# Patient Record
Sex: MALE | Employment: OTHER | URBAN - METROPOLITAN AREA
[De-identification: names, ages, dates, MRNs, and addresses within clinical notes are randomized per-mention and may not be internally consistent; named-entity substitution may affect disease eponyms.]

---

## 2023-02-22 ENCOUNTER — HOSPITAL ENCOUNTER (EMERGENCY)
Age: 78
Discharge: HOME | End: 2023-02-22
Payer: MEDICARE

## 2023-02-22 VITALS
DIASTOLIC BLOOD PRESSURE: 70 MMHG | HEART RATE: 83 BPM | WEIGHT: 200 LBS | OXYGEN SATURATION: 95 % | SYSTOLIC BLOOD PRESSURE: 102 MMHG | HEIGHT: 71 IN | BODY MASS INDEX: 28 KG/M2 | RESPIRATION RATE: 16 BRPM

## 2023-02-22 DIAGNOSIS — N39.0 URINARY TRACT INFECTION ASSOCIATED WITH INDWELLING URETHRAL CATHETER, INITIAL ENCOUNTER (HCC): ICD-10-CM

## 2023-02-22 DIAGNOSIS — Z46.6 ENCOUNTER FOR FOLEY CATHETER REPLACEMENT: Primary | ICD-10-CM

## 2023-02-22 DIAGNOSIS — T83.511A URINARY TRACT INFECTION ASSOCIATED WITH INDWELLING URETHRAL CATHETER, INITIAL ENCOUNTER (HCC): ICD-10-CM

## 2023-02-22 LAB
BACTERIA: ABNORMAL /HPF
BILIRUBIN URINE: NEGATIVE
BLOOD, URINE: ABNORMAL
CLARITY: ABNORMAL
COLOR: ABNORMAL
EPITHELIAL CELLS, UA: ABNORMAL /HPF (ref 0–5)
GLUCOSE URINE: NEGATIVE MG/DL
HYALINE CASTS: ABNORMAL /HPF (ref 0–5)
KETONES, URINE: NEGATIVE MG/DL
LEUKOCYTE ESTERASE, URINE: ABNORMAL
NITRITE, URINE: NEGATIVE
PH UA: 5 (ref 5–9)
PROTEIN UA: >=300 MG/DL
RBC UA: >100 /HPF (ref 0–2)
SPECIFIC GRAVITY UA: 1.02 (ref 1–1.03)
UROBILINOGEN, URINE: 0.2 E.U./DL
WBC UA: >100 /HPF (ref 0–5)

## 2023-02-22 PROCEDURE — 81001 URINALYSIS AUTO W/SCOPE: CPT

## 2023-02-22 PROCEDURE — 99284 EMERGENCY DEPT VISIT MOD MDM: CPT

## 2023-02-22 PROCEDURE — 6370000000 HC RX 637 (ALT 250 FOR IP): Performed by: PHYSICIAN ASSISTANT

## 2023-02-22 PROCEDURE — 6360000002 HC RX W HCPCS: Performed by: PHYSICIAN ASSISTANT

## 2023-02-22 PROCEDURE — 96374 THER/PROPH/DIAG INJ IV PUSH: CPT

## 2023-02-22 PROCEDURE — 96375 TX/PRO/DX INJ NEW DRUG ADDON: CPT

## 2023-02-22 RX ORDER — CIPROFLOXACIN 500 MG/1
500 TABLET, FILM COATED ORAL ONCE
Status: COMPLETED | OUTPATIENT
Start: 2023-02-22 | End: 2023-02-22

## 2023-02-22 RX ORDER — MORPHINE SULFATE 2 MG/ML
2 INJECTION, SOLUTION INTRAMUSCULAR; INTRAVENOUS ONCE
Status: COMPLETED | OUTPATIENT
Start: 2023-02-22 | End: 2023-02-22

## 2023-02-22 RX ORDER — LIDOCAINE HYDROCHLORIDE 20 MG/ML
JELLY TOPICAL PRN
Status: DISCONTINUED | OUTPATIENT
Start: 2023-02-22 | End: 2023-02-22 | Stop reason: HOSPADM

## 2023-02-22 RX ORDER — CIPROFLOXACIN 500 MG/1
500 TABLET, FILM COATED ORAL 2 TIMES DAILY
Qty: 10 TABLET | Refills: 0 | Status: SHIPPED | OUTPATIENT
Start: 2023-02-22 | End: 2023-02-27

## 2023-02-22 RX ORDER — ONDANSETRON 2 MG/ML
4 INJECTION INTRAMUSCULAR; INTRAVENOUS ONCE
Status: COMPLETED | OUTPATIENT
Start: 2023-02-22 | End: 2023-02-22

## 2023-02-22 RX ADMIN — LIDOCAINE HYDROCHLORIDE: 20 JELLY TOPICAL at 16:50

## 2023-02-22 RX ADMIN — ONDANSETRON 4 MG: 2 INJECTION INTRAMUSCULAR; INTRAVENOUS at 16:48

## 2023-02-22 RX ADMIN — MORPHINE SULFATE 2 MG: 2 INJECTION, SOLUTION INTRAMUSCULAR; INTRAVENOUS at 16:49

## 2023-02-22 RX ADMIN — CIPROFLOXACIN 500 MG: 500 TABLET, FILM COATED ORAL at 18:09

## 2023-02-22 ASSESSMENT — ENCOUNTER SYMPTOMS
EYE DISCHARGE: 0
CONSTIPATION: 0
ABDOMINAL DISTENTION: 0
RHINORRHEA: 0
SORE THROAT: 0
COLOR CHANGE: 0
ABDOMINAL PAIN: 0
SHORTNESS OF BREATH: 0

## 2023-02-22 ASSESSMENT — PAIN SCALES - GENERAL
PAINLEVEL_OUTOF10: 0
PAINLEVEL_OUTOF10: 0
PAINLEVEL_OUTOF10: 8

## 2023-02-22 ASSESSMENT — PAIN DESCRIPTION - FREQUENCY: FREQUENCY: CONTINUOUS

## 2023-02-22 ASSESSMENT — PAIN DESCRIPTION - PAIN TYPE: TYPE: CHRONIC PAIN

## 2023-02-22 ASSESSMENT — PAIN DESCRIPTION - LOCATION: LOCATION: PENIS

## 2023-02-22 ASSESSMENT — PAIN - FUNCTIONAL ASSESSMENT: PAIN_FUNCTIONAL_ASSESSMENT: 0-10

## 2023-02-22 NOTE — ED TRIAGE NOTES
Patient has had a larson placed at [facility], the [facility] was attempting to replace it and had complications upon putting a new larson placed. After attempts and issues with a new larson he started to have bleeding from the tip of his penis.

## 2023-02-22 NOTE — ED PROVIDER NOTES
[address] [facility] ED  eMERGENCY dEPARTMENT eNCOUnter      Pt Name: Ulises Mir  MRN: [id number]  [place] 1945  Date of evaluation: 2/22/2023  Provider: Alejandro Kelly PA-C    CHIEF COMPLAINT       Chief Complaint   Patient presents with    Penile Discharge     Bleeding from his penis, after several catheter attempts and removals         HISTORY OF PRESENT ILLNESS   (Location/Symptom, Timing/Onset,Context/Setting, Quality, Duration, Modifying Factors, Severity)  Note limiting factors. Ulises Mir is a 76 y.o. male who presents to the emergency department with complaint of urethral bleeding secondary to attempts at inserting a Lyons catheter at nursing home. States has indwelling Lyons catheter which is chronically present for him, he states has not been changed for approximate last 8 months, at the nursing facility today they decided to change it out, they remove the other 1 without any difficulty, and then tried to replace it, they were unable to successfully replace Lyons catheter, patient states that he has had bleeding from the penis since the attempts at reinsertion of Lyons catheter. Patient denies any pain at this time, 0-10 states Lyons catheter was removed around 5 AM this morning. He has not been able to urinate or produce urine since removal of Lyons catheter. HPI    NursingNotes were reviewed. REVIEW OF SYSTEMS    (2-9 systems for level 4, 10 or more for level 5)     Review of Systems   Constitutional:  Negative for activity change and appetite change. HENT:  Negative for congestion, ear discharge, ear pain, nosebleeds, rhinorrhea and sore throat. Eyes:  Negative for discharge. Respiratory:  Negative for shortness of breath. Cardiovascular:  Negative for chest pain, palpitations and leg swelling. Gastrointestinal:  Negative for abdominal distention, abdominal pain and constipation. Genitourinary:  Negative for difficulty urinating and dysuria.         Urethral Malnutrition Findings: BMI Body mass index is 22 67 kg/m²       · Mild chronic malnutrition bleeding   Musculoskeletal:  Negative for arthralgias. Skin:  Negative for color change. Neurological:  Negative for dizziness, syncope, numbness and headaches. Psychiatric/Behavioral:  Negative for agitation and confusion. Except as noted above the remainder of the review of systems was reviewed and negative. PAST MEDICAL HISTORY   No past medical history on file. SURGICALHISTORY     No past surgical history on file. CURRENT MEDICATIONS       Previous Medications    No medications on file            Penicillins, Sulfamethoxazole, Tapentadol, and Trimethoprim    FAMILY HISTORY     No family history on file. SOCIAL HISTORY       Social History     Socioeconomic History    Marital status:        SCREENINGS    [place] Coma Scale  Eye Opening: Spontaneous  Best Verbal Response: Oriented  Best Motor Response: Obeys commands  [place] Coma Scale Score: 15        PHYSICAL EXAM    (up to 7 for level 4, 8 or more for level 5)     ED Triage Vitals [02/22/23 1521]   BP Temp Temp src Heart Rate Resp SpO2 Height Weight   94/66 -- -- 88 18 95 % 5' 11\" (1.803 m) 200 lb (90.7 kg)       Physical Exam  Vitals and nursing note reviewed. Constitutional:       General: He is not in acute distress. Appearance: He is well-developed. He is not ill-appearing, toxic-appearing or diaphoretic. HENT:      Head: Normocephalic. Nose: No congestion. Mouth/Throat:      Mouth: Mucous membranes are moist.      Pharynx: No oropharyngeal exudate or posterior oropharyngeal erythema. Eyes:      Extraocular Movements: Extraocular movements intact. Conjunctiva/sclera: Conjunctivae normal.      Pupils: Pupils are equal, round, and reactive to light. Neck:      Vascular: No JVD. Trachea: No tracheal deviation. Cardiovascular:      Rate and Rhythm: Normal rate. Pulses: Normal pulses. Heart sounds: Normal heart sounds. No murmur heard. No friction rub. No gallop.    Pulmonary: Effort: Pulmonary effort is normal. No tachypnea, accessory muscle usage, respiratory distress or retractions. Breath sounds: No stridor. No wheezing, rhonchi or rales. Chest:      Chest wall: No tenderness. Abdominal:      General: Abdomen is flat. Bowel sounds are normal. There is no distension or abdominal bruit. Palpations: There is no shifting dullness, fluid wave, hepatomegaly, splenomegaly, mass or pulsatile mass. Tenderness: There is no abdominal tenderness. There is no right CVA tenderness, left CVA tenderness, guarding or rebound. Negative signs include Davis's sign, Rovsing's sign and McBurney's sign. Genitourinary:     Comments: Patient has no visible signs of penile injury, there is a small amount of blood noted at the opening of the meatus. No swelling, no other drainage or discharge is noted. Abdomen soft nondistended. Musculoskeletal:         General: No deformity. Cervical back: Normal range of motion and neck supple. No rigidity. Skin:     General: Skin is warm and dry. Capillary Refill: Capillary refill takes less than 2 seconds. Coloration: Skin is not jaundiced. Neurological:      General: No focal deficit present. Mental Status: He is alert and oriented to person, place, and time. Mental status is at baseline. Cranial Nerves: No cranial nerve deficit. Sensory: No sensory deficit. Motor: No weakness.       Coordination: Coordination normal.   Psychiatric:         Mood and Affect: Mood normal.       RESULTS     EKG: All EKG's are interpreted by the Emergency Department Physician who either signs or Co-signsthis chart in the absence of a cardiologist.        RADIOLOGY:   Non-plain filmimages such as CT, Ultrasound and MRI are read by the radiologist. Plain radiographic images are visualized and preliminarily interpreted by the emergency physician with the below findings:      Interpretation per the Radiologist below, if available at the time ofthis note:    No orders to display         ED BEDSIDE ULTRASOUND:   Performed by ED Physician - none    LABS:  Labs Reviewed   URINALYSIS - Abnormal; Notable for the following components:       Result Value    Color, UA RED (*)     Clarity, UA TURBID (*)     Blood, Urine LARGE (*)     Protein, UA >=300 (*)     Leukocyte Esterase, Urine LARGE (*)     All other components within normal limits   MICROSCOPIC URINALYSIS       All other labs were within normal range or not returned as of this dictation. EMERGENCY DEPARTMENT COURSE and DIFFERENTIAL DIAGNOSIS/MDM:   Vitals:    Vitals:    02/22/23 1521 02/22/23 1649   BP: 94/66    Pulse: 88    Resp: 18 20   SpO2: 95%    Weight: 200 lb (90.7 kg)    Height: 5' 11\" (1.803 m)             Medical Decision Making  Presented to ED with complaint of urethral bleeding secondary to attempts at reinsertion of Lyons catheter at nursing facility. Patient states that indwelling Lyons catheter for quite some time, he states last changed approximately 8 months ago, a Lyons catheter change today at facility was unsuccessful, they had multiple attempts and patient started having bleeding from the urethral area. On arrival to the ED, there is small mount of blood noted within the tip of the penis, Lyons catheter was easily replaced, and was flowing blood-tinged urine, which was cloudy. This was sent off for analysis, there are concerns for urinary tract infection. Patient was given a prescription for Cipro, and advised to contact his family provider for follow-up. Should he have any worsening or change symptoms, advised return to ED for reevaluation. Amount and/or Complexity of Data Reviewed  Labs: ordered. Risk  Prescription drug management. Coding     CONSULTS:  None    PROCEDURES:  Unless otherwise noted below, none     Procedures    FINAL IMPRESSION      1. Encounter for Lyons catheter replacement    2.  Urinary tract infection associated with indwelling urethral catheter, initial encounter [facility])          DISPOSITION/PLAN   DISPOSITION Decision To Discharge 02/22/2023 05:57:15 PM      PATIENT REFERRED TO:  Go Gaspar MD    In 3 days      Stephanie Domínguez MD  [address]:  New Prescriptions    CIPROFLOXACIN (CIPRO) 500 MG TABLET    Take 1 tablet by mouth 2 times daily for 5 days          (Please note that portions of this note were completed with a voice recognition program.  Efforts were made to edit the dictations but occasionally words are mis-transcribed.)    Gene Sherwood PA-C (electronically signed)  Attending Emergency Physician         Gene Sherwood PA-C  02/22/23 15 [facility] Sejal Lawrence PA-C  02/22/23 [id number]

## 2023-03-02 LAB — SARS-COV-2: DETECTED

## 2023-03-08 ENCOUNTER — HOSPITAL ENCOUNTER (INPATIENT)
Age: 78
LOS: 10 days | Discharge: ANOTHER HOSPITAL | DRG: 177 | End: 2023-03-18
Attending: EMERGENCY MEDICINE | Admitting: INTERNAL MEDICINE
Payer: MEDICARE

## 2023-03-08 ENCOUNTER — APPOINTMENT (OUTPATIENT)
Dept: CT IMAGING | Age: 78
DRG: 177 | End: 2023-03-08
Payer: MEDICARE

## 2023-03-08 ENCOUNTER — APPOINTMENT (OUTPATIENT)
Dept: GENERAL RADIOLOGY | Age: 78
DRG: 177 | End: 2023-03-08
Payer: MEDICARE

## 2023-03-08 DIAGNOSIS — R09.02 HYPOXIA: Primary | ICD-10-CM

## 2023-03-08 DIAGNOSIS — U07.1 COVID-19: ICD-10-CM

## 2023-03-08 DIAGNOSIS — R06.00 DYSPNEA, UNSPECIFIED TYPE: ICD-10-CM

## 2023-03-08 DIAGNOSIS — U07.1 COVID-19 VIRUS RNA TEST RESULT POSITIVE AT LIMIT OF DETECTION: ICD-10-CM

## 2023-03-08 DIAGNOSIS — I50.40 COMBINED SYSTOLIC AND DIASTOLIC CONGESTIVE HEART FAILURE, UNSPECIFIED HF CHRONICITY (HCC): ICD-10-CM

## 2023-03-08 DIAGNOSIS — J98.11 COLLAPSE OF LEFT LUNG: ICD-10-CM

## 2023-03-08 PROBLEM — J96.01 ACUTE RESPIRATORY FAILURE WITH HYPOXIA (HCC): Status: ACTIVE | Noted: 2023-03-08

## 2023-03-08 LAB
ALBUMIN SERPL-MCNC: 3 G/DL (ref 3.5–4.6)
ALP BLD-CCNC: 37 U/L (ref 35–104)
ALT SERPL-CCNC: 18 U/L (ref 0–41)
ANION GAP SERPL CALCULATED.3IONS-SCNC: 9 MEQ/L (ref 9–15)
AST SERPL-CCNC: 49 U/L (ref 0–40)
BASE EXCESS ARTERIAL: 4 (ref -3–3)
BASOPHILS ABSOLUTE: 0 K/UL (ref 0–0.2)
BASOPHILS RELATIVE PERCENT: 0.2 %
BILIRUB SERPL-MCNC: 0.7 MG/DL (ref 0.2–0.7)
BUN BLDV-MCNC: 49 MG/DL (ref 8–23)
CALCIUM IONIZED: 1.3 MMOL/L (ref 1.12–1.32)
CALCIUM SERPL-MCNC: 8.7 MG/DL (ref 8.5–9.9)
CHLORIDE BLD-SCNC: 101 MEQ/L (ref 95–107)
CO2: 28 MEQ/L (ref 20–31)
CREAT SERPL-MCNC: 1.18 MG/DL (ref 0.7–1.2)
EOSINOPHILS ABSOLUTE: 0.1 K/UL (ref 0–0.7)
EOSINOPHILS RELATIVE PERCENT: 1 %
GFR SERPL CREATININE-BSD FRML MDRD: 51 ML/MIN/{1.73_M2}
GFR SERPL CREATININE-BSD FRML MDRD: 56 ML/MIN/{1.73_M2}
GFR SERPL CREATININE-BSD FRML MDRD: >60 ML/MIN/{1.73_M2}
GLOBULIN: 3.8 G/DL (ref 2.3–3.5)
GLUCOSE BLD-MCNC: 101 MG/DL (ref 70–99)
GLUCOSE BLD-MCNC: 121 MG/DL (ref 70–99)
HCO3 ARTERIAL: 29.2 MMOL/L (ref 21–29)
HCT VFR BLD CALC: 29 % (ref 42–52)
HEMOGLOBIN: 11.2 GM/DL (ref 13.5–17.5)
HEMOGLOBIN: 9.1 G/DL (ref 14–18)
LACTATE: 1.03 MMOL/L (ref 0.4–2)
LACTIC ACID: 1 MMOL/L (ref 0.5–2.2)
LYMPHOCYTES ABSOLUTE: 1.5 K/UL (ref 1–4.8)
LYMPHOCYTES RELATIVE PERCENT: 12.9 %
MCH RBC QN AUTO: 28.8 PG (ref 27–31.3)
MCHC RBC AUTO-ENTMCNC: 31.4 % (ref 33–37)
MCV RBC AUTO: 91.7 FL (ref 79–92.2)
MONOCYTES ABSOLUTE: 0.8 K/UL (ref 0.2–0.8)
MONOCYTES RELATIVE PERCENT: 7 %
NEUTROPHILS ABSOLUTE: 9.2 K/UL (ref 1.4–6.5)
NEUTROPHILS RELATIVE PERCENT: 78.9 %
O2 SAT, ARTERIAL: 94 % (ref 93–100)
PCO2 ARTERIAL: 53 MM HG (ref 35–45)
PDW BLD-RTO: 21.6 % (ref 11.5–14.5)
PERFORMED ON: ABNORMAL
PERFORMED ON: ABNORMAL
PH ARTERIAL: 7.35 (ref 7.35–7.45)
PLATELET # BLD: 358 K/UL (ref 130–400)
PO2 ARTERIAL: 76 MM HG (ref 75–108)
POC CHLORIDE: 104 MEQ/L (ref 99–110)
POC CREATININE WHOLE BLOOD: 1.4
POC CREATININE: 1.3 MG/DL (ref 0.8–1.3)
POC CREATININE: 1.4 MG/DL (ref 0.8–1.3)
POC HEMATOCRIT: 33 % (ref 41–53)
POC POTASSIUM: 3.7 MEQ/L (ref 3.5–5.1)
POC SAMPLE TYPE: ABNORMAL
POC SAMPLE TYPE: ABNORMAL
POC SODIUM: 140 MEQ/L (ref 136–145)
POTASSIUM SERPL-SCNC: 3.9 MEQ/L (ref 3.4–4.9)
PRO-BNP: NORMAL PG/ML
PROCALCITONIN: 0.07 NG/ML (ref 0–0.15)
RBC # BLD: 3.17 M/UL (ref 4.7–6.1)
REASON FOR REJECTION: NORMAL
REJECTED TEST: NORMAL
SODIUM BLD-SCNC: 138 MEQ/L (ref 135–144)
TCO2 ARTERIAL: 31 MMOL/L (ref 21–32)
TOTAL PROTEIN: 6.8 G/DL (ref 6.3–8)
TROPONIN: 0.02 NG/ML (ref 0–0.01)
WBC # BLD: 11.6 K/UL (ref 4.8–10.8)

## 2023-03-08 PROCEDURE — 36600 WITHDRAWAL OF ARTERIAL BLOOD: CPT

## 2023-03-08 PROCEDURE — 6370000000 HC RX 637 (ALT 250 FOR IP): Performed by: INTERNAL MEDICINE

## 2023-03-08 PROCEDURE — 83605 ASSAY OF LACTIC ACID: CPT

## 2023-03-08 PROCEDURE — 82565 ASSAY OF CREATININE: CPT

## 2023-03-08 PROCEDURE — 71045 X-RAY EXAM CHEST 1 VIEW: CPT

## 2023-03-08 PROCEDURE — 84484 ASSAY OF TROPONIN QUANT: CPT

## 2023-03-08 PROCEDURE — 84295 ASSAY OF SERUM SODIUM: CPT

## 2023-03-08 PROCEDURE — 87040 BLOOD CULTURE FOR BACTERIA: CPT

## 2023-03-08 PROCEDURE — 71275 CT ANGIOGRAPHY CHEST: CPT

## 2023-03-08 PROCEDURE — 96375 TX/PRO/DX INJ NEW DRUG ADDON: CPT

## 2023-03-08 PROCEDURE — 84132 ASSAY OF SERUM POTASSIUM: CPT

## 2023-03-08 PROCEDURE — 85014 HEMATOCRIT: CPT

## 2023-03-08 PROCEDURE — 36415 COLL VENOUS BLD VENIPUNCTURE: CPT

## 2023-03-08 PROCEDURE — 83880 ASSAY OF NATRIURETIC PEPTIDE: CPT

## 2023-03-08 PROCEDURE — 94761 N-INVAS EAR/PLS OXIMETRY MLT: CPT

## 2023-03-08 PROCEDURE — 6360000002 HC RX W HCPCS: Performed by: EMERGENCY MEDICINE

## 2023-03-08 PROCEDURE — 1210000000 HC MED SURG R&B

## 2023-03-08 PROCEDURE — 2700000000 HC OXYGEN THERAPY PER DAY

## 2023-03-08 PROCEDURE — 93005 ELECTROCARDIOGRAM TRACING: CPT | Performed by: EMERGENCY MEDICINE

## 2023-03-08 PROCEDURE — 6360000004 HC RX CONTRAST MEDICATION: Performed by: EMERGENCY MEDICINE

## 2023-03-08 PROCEDURE — 96374 THER/PROPH/DIAG INJ IV PUSH: CPT

## 2023-03-08 PROCEDURE — 85025 COMPLETE CBC W/AUTO DIFF WBC: CPT

## 2023-03-08 PROCEDURE — 82803 BLOOD GASES ANY COMBINATION: CPT

## 2023-03-08 PROCEDURE — 80053 COMPREHEN METABOLIC PANEL: CPT

## 2023-03-08 PROCEDURE — 99285 EMERGENCY DEPT VISIT HI MDM: CPT

## 2023-03-08 PROCEDURE — 2580000003 HC RX 258: Performed by: INTERNAL MEDICINE

## 2023-03-08 PROCEDURE — 82435 ASSAY OF BLOOD CHLORIDE: CPT

## 2023-03-08 PROCEDURE — 96376 TX/PRO/DX INJ SAME DRUG ADON: CPT

## 2023-03-08 PROCEDURE — 82330 ASSAY OF CALCIUM: CPT

## 2023-03-08 PROCEDURE — 84145 PROCALCITONIN (PCT): CPT

## 2023-03-08 RX ORDER — POLYETHYLENE GLYCOL 3350 17 G/17G
17 POWDER, FOR SOLUTION ORAL DAILY PRN
Status: DISCONTINUED | OUTPATIENT
Start: 2023-03-08 | End: 2023-03-18 | Stop reason: HOSPADM

## 2023-03-08 RX ORDER — GUAIFENESIN 600 MG/1
1 TABLET, EXTENDED RELEASE ORAL 2 TIMES DAILY
COMMUNITY
Start: 2023-03-05

## 2023-03-08 RX ORDER — ONDANSETRON 4 MG/1
4 TABLET, ORALLY DISINTEGRATING ORAL EVERY 8 HOURS PRN
Status: DISCONTINUED | OUTPATIENT
Start: 2023-03-08 | End: 2023-03-09 | Stop reason: ALTCHOICE

## 2023-03-08 RX ORDER — TAMSULOSIN HYDROCHLORIDE 0.4 MG/1
0.4 CAPSULE ORAL NIGHTLY
COMMUNITY

## 2023-03-08 RX ORDER — ACETAMINOPHEN 500 MG
2 TABLET ORAL EVERY 8 HOURS PRN
COMMUNITY
Start: 2015-09-11

## 2023-03-08 RX ORDER — SODIUM CHLORIDE 9 MG/ML
INJECTION, SOLUTION INTRAVENOUS PRN
Status: DISCONTINUED | OUTPATIENT
Start: 2023-03-08 | End: 2023-03-18 | Stop reason: HOSPADM

## 2023-03-08 RX ORDER — FINASTERIDE 5 MG/1
5 TABLET, FILM COATED ORAL DAILY
COMMUNITY
Start: 2022-08-11

## 2023-03-08 RX ORDER — ERGOCALCIFEROL 1.25 MG/1
1 CAPSULE ORAL DAILY
COMMUNITY
Start: 2023-01-19

## 2023-03-08 RX ORDER — TADALAFIL 5 MG/1
5 TABLET ORAL DAILY
COMMUNITY
Start: 2022-12-16

## 2023-03-08 RX ORDER — LORAZEPAM 2 MG/ML
0.5 INJECTION INTRAMUSCULAR ONCE
Status: COMPLETED | OUTPATIENT
Start: 2023-03-08 | End: 2023-03-08

## 2023-03-08 RX ORDER — ACETAMINOPHEN 650 MG/1
650 SUPPOSITORY RECTAL EVERY 6 HOURS PRN
Status: DISCONTINUED | OUTPATIENT
Start: 2023-03-08 | End: 2023-03-18 | Stop reason: HOSPADM

## 2023-03-08 RX ORDER — ONDANSETRON 2 MG/ML
4 INJECTION INTRAMUSCULAR; INTRAVENOUS EVERY 6 HOURS PRN
Status: DISCONTINUED | OUTPATIENT
Start: 2023-03-08 | End: 2023-03-09 | Stop reason: ALTCHOICE

## 2023-03-08 RX ORDER — GABAPENTIN 600 MG/1
600 TABLET ORAL 3 TIMES DAILY
COMMUNITY
Start: 2022-08-11

## 2023-03-08 RX ORDER — EZETIMIBE 10 MG/1
10 TABLET ORAL NIGHTLY
COMMUNITY
Start: 2022-09-22

## 2023-03-08 RX ORDER — GUAIFENESIN 600 MG/1
600 TABLET, EXTENDED RELEASE ORAL 2 TIMES DAILY
Status: DISCONTINUED | OUTPATIENT
Start: 2023-03-08 | End: 2023-03-18 | Stop reason: HOSPADM

## 2023-03-08 RX ORDER — METHOCARBAMOL 1000 MG/1
750 TABLET, COATED ORAL 3 TIMES DAILY
Status: ON HOLD | COMMUNITY
Start: 2022-08-11 | End: 2023-03-08

## 2023-03-08 RX ORDER — LEVOFLOXACIN 750 MG/1
1 TABLET ORAL DAILY
COMMUNITY
Start: 2023-03-03

## 2023-03-08 RX ORDER — GUAIFENESIN/DEXTROMETHORPHAN 100-10MG/5
5 SYRUP ORAL EVERY 4 HOURS PRN
Status: DISCONTINUED | OUTPATIENT
Start: 2023-03-08 | End: 2023-03-18 | Stop reason: HOSPADM

## 2023-03-08 RX ORDER — ENOXAPARIN SODIUM 100 MG/ML
40 INJECTION SUBCUTANEOUS DAILY
Status: DISCONTINUED | OUTPATIENT
Start: 2023-03-09 | End: 2023-03-09

## 2023-03-08 RX ORDER — METHOCARBAMOL 750 MG/1
750 TABLET, FILM COATED ORAL 3 TIMES DAILY
COMMUNITY

## 2023-03-08 RX ORDER — ATORVASTATIN CALCIUM 40 MG/1
1 TABLET, FILM COATED ORAL DAILY
COMMUNITY
Start: 2021-11-15

## 2023-03-08 RX ORDER — LEVOFLOXACIN 5 MG/ML
750 INJECTION, SOLUTION INTRAVENOUS EVERY 24 HOURS
Status: DISCONTINUED | OUTPATIENT
Start: 2023-03-08 | End: 2023-03-09

## 2023-03-08 RX ORDER — LANOLIN ALCOHOL/MO/W.PET/CERES
1 CREAM (GRAM) TOPICAL NIGHTLY
COMMUNITY
Start: 2023-03-03

## 2023-03-08 RX ORDER — SERTRALINE HYDROCHLORIDE 25 MG/1
75 TABLET, FILM COATED ORAL DAILY
COMMUNITY
Start: 2023-03-03

## 2023-03-08 RX ORDER — FUROSEMIDE 10 MG/ML
20 INJECTION INTRAMUSCULAR; INTRAVENOUS ONCE
Status: COMPLETED | OUTPATIENT
Start: 2023-03-08 | End: 2023-03-08

## 2023-03-08 RX ORDER — FUROSEMIDE 40 MG/1
40 TABLET ORAL DAILY
COMMUNITY
Start: 2022-12-29

## 2023-03-08 RX ORDER — BENZONATATE 100 MG/1
100 CAPSULE ORAL 3 TIMES DAILY PRN
Status: DISCONTINUED | OUTPATIENT
Start: 2023-03-08 | End: 2023-03-18 | Stop reason: HOSPADM

## 2023-03-08 RX ORDER — FERROUS SULFATE TAB EC 324 MG (65 MG FE EQUIVALENT) 324 (65 FE) MG
324 TABLET DELAYED RESPONSE ORAL
COMMUNITY
Start: 2022-08-11

## 2023-03-08 RX ORDER — HYDROCODONE BITARTRATE AND ACETAMINOPHEN 5; 325 MG/1; MG/1
1 TABLET ORAL EVERY 6 HOURS PRN
COMMUNITY
Start: 2023-01-13

## 2023-03-08 RX ORDER — DEXAMETHASONE SODIUM PHOSPHATE 10 MG/ML
6 INJECTION INTRAMUSCULAR; INTRAVENOUS DAILY
Status: DISCONTINUED | OUTPATIENT
Start: 2023-03-09 | End: 2023-03-11

## 2023-03-08 RX ORDER — FUROSEMIDE 10 MG/ML
40 INJECTION INTRAMUSCULAR; INTRAVENOUS ONCE
Status: COMPLETED | OUTPATIENT
Start: 2023-03-08 | End: 2023-03-08

## 2023-03-08 RX ORDER — SODIUM CHLORIDE 0.9 % (FLUSH) 0.9 %
5-40 SYRINGE (ML) INJECTION PRN
Status: DISCONTINUED | OUTPATIENT
Start: 2023-03-08 | End: 2023-03-18 | Stop reason: HOSPADM

## 2023-03-08 RX ORDER — ACETAMINOPHEN 325 MG/1
650 TABLET ORAL EVERY 6 HOURS PRN
Status: DISCONTINUED | OUTPATIENT
Start: 2023-03-08 | End: 2023-03-18 | Stop reason: HOSPADM

## 2023-03-08 RX ORDER — PREDNISONE 20 MG/1
40 TABLET ORAL DAILY
COMMUNITY
Start: 2023-03-03

## 2023-03-08 RX ORDER — ENOXAPARIN SODIUM 100 MG/ML
40 INJECTION SUBCUTANEOUS DAILY
COMMUNITY
Start: 2023-03-03

## 2023-03-08 RX ORDER — SODIUM CHLORIDE 0.9 % (FLUSH) 0.9 %
5-40 SYRINGE (ML) INJECTION EVERY 12 HOURS SCHEDULED
Status: DISCONTINUED | OUTPATIENT
Start: 2023-03-08 | End: 2023-03-18 | Stop reason: HOSPADM

## 2023-03-08 RX ORDER — ALBUTEROL SULFATE 90 UG/1
2 AEROSOL, METERED RESPIRATORY (INHALATION) 4 TIMES DAILY
Status: DISCONTINUED | OUTPATIENT
Start: 2023-03-08 | End: 2023-03-18 | Stop reason: HOSPADM

## 2023-03-08 RX ORDER — FENOFIBRATE 145 MG/1
145 TABLET, COATED ORAL
COMMUNITY
Start: 2022-08-11

## 2023-03-08 RX ORDER — 0.9 % SODIUM CHLORIDE 0.9 %
1000 INTRAVENOUS SOLUTION INTRAVENOUS ONCE
Status: DISCONTINUED | OUTPATIENT
Start: 2023-03-08 | End: 2023-03-18 | Stop reason: HOSPADM

## 2023-03-08 RX ORDER — IPRATROPIUM BROMIDE AND ALBUTEROL SULFATE 2.5; .5 MG/3ML; MG/3ML
1 SOLUTION RESPIRATORY (INHALATION) 4 TIMES DAILY
COMMUNITY
Start: 2023-03-06

## 2023-03-08 RX ADMIN — LORAZEPAM 0.5 MG: 2 INJECTION INTRAMUSCULAR; INTRAVENOUS at 17:37

## 2023-03-08 RX ADMIN — Medication 10 ML: at 22:45

## 2023-03-08 RX ADMIN — GUAIFENESIN 600 MG: 600 TABLET, EXTENDED RELEASE ORAL at 22:45

## 2023-03-08 RX ADMIN — FUROSEMIDE 20 MG: 10 INJECTION, SOLUTION INTRAMUSCULAR; INTRAVENOUS at 18:29

## 2023-03-08 RX ADMIN — IOPAMIDOL 75 ML: 612 INJECTION, SOLUTION INTRAVENOUS at 17:35

## 2023-03-08 RX ADMIN — FUROSEMIDE 40 MG: 10 INJECTION, SOLUTION INTRAMUSCULAR; INTRAVENOUS at 17:02

## 2023-03-08 ASSESSMENT — PAIN - FUNCTIONAL ASSESSMENT
PAIN_FUNCTIONAL_ASSESSMENT: NONE - DENIES PAIN

## 2023-03-08 ASSESSMENT — ENCOUNTER SYMPTOMS
CHEST TIGHTNESS: 1
EYE PAIN: 0
SHORTNESS OF BREATH: 1
SORE THROAT: 0
VOMITING: 0
COUGH: 1
ABDOMINAL PAIN: 0
NAUSEA: 0

## 2023-03-08 ASSESSMENT — LIFESTYLE VARIABLES
HOW OFTEN DO YOU HAVE A DRINK CONTAINING ALCOHOL: NEVER
HOW MANY STANDARD DRINKS CONTAINING ALCOHOL DO YOU HAVE ON A TYPICAL DAY: PATIENT DOES NOT DRINK

## 2023-03-08 NOTE — ED PROVIDER NOTES
[address] [facility] ED  EMERGENCY DEPARTMENT ENCOUNTER      Pt Name: Ella Moncada  MRN: [id number]  [place] 1945  Date of evaluation: 3/8/2023  Provider: Milagros Mina DO    CHIEF COMPLAINT       Chief Complaint   Patient presents with    Shortness of Breath     Pt was diagnosed with COVID 1 week ago and has had increased SOB and work of breathing. Pt was a wet cough noted. Pt on 4 L NC. Pt given 1 du neb and 125 mg solumedrol PTA. Pt is a/ox4. HISTORY OF PRESENT ILLNESS   (Location/Symptom, Timing/Onset, Context/Setting, Quality, Duration, Modifying Factors, Severity)  Note limiting factors. Ella Moncada is a 76 y.o. male who presents to the emergency department . Patient who was diagnosed with COVID 6 days ago presents with increasing shortness of breath hypoxia from the nursing home. Yesterday and it was felt that he might have some congestive heart failure and started on Lasix. Patient also on Prednisone, molnupiravir and Levaquin. patient presents on 4 L O2 per nasal cannula. Patient given 1 DuoNeb and Solu-Medrol 125 mg prior to arrival. Still very dyspneic. History of hypertensive chronic kidney disease, vascular dementia, atrial fibrillation, pacemaker, abdominal aortic aneurysm without rupture, coronary artery disease, hyperlipidemia, pleural effusion, depression. HPI    Nursing Notes were reviewed. REVIEW OF SYSTEMS    (2-9 systems for level 4, 10 or more for level 5)     Review of Systems   Constitutional:  Positive for fatigue. Negative for activity change, appetite change and fever. HENT:  Positive for congestion. Negative for sore throat. Eyes:  Negative for pain and visual disturbance. Respiratory:  Positive for cough, chest tightness and shortness of breath. Cardiovascular:  Negative for chest pain. Gastrointestinal:  Negative for abdominal pain, nausea and vomiting. Endocrine: Negative for polydipsia.    Genitourinary:  Negative for flank pain and urgency. Musculoskeletal:  Negative for gait problem and neck stiffness. Skin:  Negative for rash. Neurological:  Negative for weakness, light-headedness and headaches. Psychiatric/Behavioral:  Negative for confusion and sleep disturbance. Except as noted above the remainder of the review of systems was reviewed and negative. PAST MEDICAL HISTORY   No past medical history on file. SURGICAL HISTORY     No past surgical history on file. CURRENT MEDICATIONS       Previous Medications    No medications on file       ALLERGIES     Penicillins, Sulfamethoxazole, Tapentadol, and Trimethoprim    FAMILY HISTORY     No family history on file. SOCIAL HISTORY       Social History     Socioeconomic History    Marital status:        SCREENINGS        [place] Coma Scale  Eye Opening: Spontaneous  Best Verbal Response: Oriented  Best Motor Response: Obeys commands  [place] Coma Scale Score: 15               PHYSICAL EXAM    (up to 7 for level 4, 8 or more for level 5)     ED Triage Vitals [03/08/23 1532]   BP Temp Temp Source Heart Rate Resp SpO2 Height Weight   98/82 98.6 °F (37 °C) Oral (!) 104 30 96 % 5' 11\" (1.803 m) 180 lb (81.6 kg)       Physical Exam  Vitals and nursing note reviewed. Constitutional:       General: He is in acute distress. Appearance: He is well-developed. He is ill-appearing. He is not diaphoretic. HENT:      Head: Normocephalic and atraumatic. Right Ear: External ear normal.      Left Ear: External ear normal.      Nose: Nose normal.      Mouth/Throat:      Mouth: Mucous membranes are moist.      Pharynx: No oropharyngeal exudate. Eyes:      Extraocular Movements: Extraocular movements intact. Conjunctiva/sclera: Conjunctivae normal.      Pupils: Pupils are equal, round, and reactive to light. Neck:      Thyroid: No thyromegaly. Vascular: No JVD. Trachea: No tracheal deviation. Cardiovascular:      Rate and Rhythm: Normal rate. Heart sounds: Normal heart sounds. No murmur heard. Pulmonary:      Effort: Pulmonary effort is normal. No respiratory distress. Breath sounds: Examination of the right-middle field reveals rhonchi and rales. Examination of the left-middle field reveals rhonchi and rales. Examination of the right-lower field reveals rhonchi and rales. Examination of the left-lower field reveals rhonchi and rales. Rhonchi and rales present. No decreased breath sounds or wheezing. Abdominal:      General: Bowel sounds are normal.      Palpations: Abdomen is soft. Tenderness: There is no abdominal tenderness. There is no guarding. Musculoskeletal:         General: Normal range of motion. Cervical back: Normal range of motion and neck supple. Right lower leg: No edema. Left lower leg: No edema. Skin:     General: Skin is warm and dry. Findings: No rash. Neurological:      General: No focal deficit present. Mental Status: He is alert and oriented to person, place, and time. Cranial Nerves: No cranial nerve deficit. Psychiatric:         Behavior: Behavior normal.       DIAGNOSTIC RESULTS     EKG: All EKG's are interpreted by the Emergency Department Physician who either signs or Co-signs this chart in the absence of a cardiologist.    Ventricular paced rhythm with PVCs 105 bpm no acute ischemia    RADIOLOGY:   Non-plain film images such as CT, Ultrasound and MRI are read by the radiologist. Plain radiographic images are visualized and preliminarily interpreted by the emergency physician with the below findings:        Interpretation per the Radiologist below, if available at the time of this note:    CTA CHEST W WO CONTRAST PE Eval   Final Result   1. Negative study for pulmonary embolism. 2. Chronic scarring versus atelectasis in the left lung. 3. Bilateral pleural calcifications which may be due to asbestos related   pleural disease, chronic hematomas or granulomatous disease. 4. Prominence of the main pulmonary arteries which may indicate pulmonary   arterial hypertension. 5. Bilateral nephrolithiasis. 6. Cholelithiasis. 7. 5 mm right upper lobe pulmonary nodule. RECOMMENDATIONS:   Fleischner Society guidelines for follow-up and management of incidentally   detected pulmonary nodules:      Single Solid Nodule:      Nodule size less than 6 mm   In a low-risk patient, no routine follow-up. In a high-risk patient, optional CT at 12 months. - Low risk patients include individuals with minimal or absent history of   smoking and other known risk factors. - High risk patients include individuals with a history or smoking or known   risk factors. Radiology 2017 http://pubs. rsna.org/doi/full/10.1148/radiol. 3676990600         XR CHEST PORTABLE   Final Result   Right pleural effusion. Probable left pleural effusion. Cardiomegaly.                ED BEDSIDE ULTRASOUND:   Performed by ED Physician - none    LABS:  Labs Reviewed   CBC WITH AUTO DIFFERENTIAL - Abnormal; Notable for the following components:       Result Value    WBC 11.6 (*)     RBC 3.17 (*)     Hemoglobin 9.1 (*)     Hematocrit 29.0 (*)     MCHC 31.4 (*)     RDW 21.6 (*)     Neutrophils Absolute 9.2 (*)     All other components within normal limits   TROPONIN - Abnormal; Notable for the following components:    Troponin 0.017 (*)     All other components within normal limits    Narrative:     Johnny MARIEED tel. [phone],  trop  results called to and read back by Dr Enrique Lopez, 03/08/2023 17:21, by  [address] - Abnormal; Notable for the following components:    Glucose 121 (*)     BUN 49 (*)     Albumin 3.0 (*)     AST 49 (*)     Globulin 3.8 (*)     All other components within normal limits    Narrative:     spoke with star at 1725   POCT ARTERIAL - Abnormal; Notable for the following components:    POC Glucose 101 (*)     Est, Glom Filt Rate 56 (*)     pH, Arterial 7.346 (*)     pCO2, Arterial 53 (*)     pO2, Arterial 76 (*)     HCO3, Arterial 29.2 (*)     Base Excess, Arterial 4 (*)     O2 Sat, Arterial 94 (*)     POC Hematocrit 33 (*)     Hemoglobin 11.2 (*)     All other components within normal limits   POCT VENOUS - Abnormal; Notable for the following components:    POC Creatinine 1.4 (*)     Est, Glom Filt Rate 51 (*)     All other components within normal limits   POCT CREATININE - Normal   CULTURE, BLOOD 1   CULTURE, BLOOD 1   LACTIC ACID   PROCALCITONIN    Narrative:     Kwaku Aldridge tel. [phone],  trop  results called to and read back by Dr Diamante Heller, 03/08/2023 17:21, by  [address]   POCT EPOC BLOOD GAS, LACTIC ACID, ICA       All other labs were within normal range or not returned as of this dictation. EMERGENCY DEPARTMENT COURSE and DIFFERENTIAL DIAGNOSIS/MDM:   Vitals:    Vitals:    03/08/23 1710 03/08/23 1740 03/08/23 1825 03/08/23 1832   BP: 106/84 111/89 106/81 108/80   Pulse: 99 (!) 104 97 98   Resp: 24 20 19 25   Temp:       TempSrc:       SpO2: 97% 96% 96% 96%   Weight:       Height:           Patient who tested positive for COVID 6 days ago and is now in severe fluid overload presents with hypoxia. Patient has pH of 7.35 with a PCO2 of 53. PO2 was 76 on 4 L. Patient put on 50% Ventimask and is doing fine. Patient given IV Lasix 40 mg and 20 mg IV separate doses because of his lying blood pressure. CAT scan of the chest does not show PE but shows collapse of the left lung and bilateral pleural effusions. Patient to be admitted to hospitalist service for further treatment. The nurse had talked to patient's daughter who agreed the patient could stay here specially because he has COVID and CHF and transfer was difficult in this situation. He would not be having any acute cardiac care until he was stabilized. Medical Decision Making  Amount and/or Complexity of Data Reviewed  Labs: ordered.   Radiology: ordered. ECG/medicine tests: ordered. Risk  Prescription drug management. Decision regarding hospitalization. REASSESSMENT          CRITICAL CARE TIME   Total Critical Care time was 45 minutes, excluding separately reportable procedures. There was a high probability of clinically significant/life threatening deterioration in the patient's condition which required my urgent intervention. CONSULTS:  None    PROCEDURES:  Unless otherwise noted below, none     Procedures        FINAL IMPRESSION      1. Hypoxia    2. Dyspnea, unspecified type    3. Combined systolic and diastolic congestive heart failure, unspecified HF chronicity ([address]    4. COVID-19    5. Collapse of left lung          DISPOSITION/PLAN   DISPOSITION Decision To Admit 03/08/2023 06:05:10 PM      PATIENT REFERRED TO:  No follow-up provider specified. DISCHARGE MEDICATIONS:  New Prescriptions    No medications on file     Controlled Substances Monitoring:     No flowsheet data found.     (Please note that portions of this note were completed with a voice recognition program.  Efforts were made to edit the dictations but occasionally words are mis-transcribed.)    Bela Hilario DO (electronically signed)  Attending Emergency Physician            Bela Hilario DO  03/08/23 [id number]

## 2023-03-09 PROBLEM — R09.02 HYPOXIA: Status: ACTIVE | Noted: 2023-03-09

## 2023-03-09 LAB
ANION GAP SERPL CALCULATED.3IONS-SCNC: 9 MEQ/L (ref 9–15)
BASOPHILS ABSOLUTE: 0 K/UL (ref 0–0.2)
BASOPHILS RELATIVE PERCENT: 0.1 %
BUN BLDV-MCNC: 48 MG/DL (ref 8–23)
CALCIUM SERPL-MCNC: 9 MG/DL (ref 8.5–9.9)
CHLORIDE BLD-SCNC: 101 MEQ/L (ref 95–107)
CO2: 32 MEQ/L (ref 20–31)
CREAT SERPL-MCNC: 1.17 MG/DL (ref 0.7–1.2)
EOSINOPHILS ABSOLUTE: 0 K/UL (ref 0–0.7)
EOSINOPHILS RELATIVE PERCENT: 0 %
GFR SERPL CREATININE-BSD FRML MDRD: >60 ML/MIN/{1.73_M2}
GLUCOSE BLD-MCNC: 103 MG/DL (ref 70–99)
GLUCOSE BLD-MCNC: 126 MG/DL (ref 70–99)
HCT VFR BLD CALC: 28.8 % (ref 42–52)
HEMOGLOBIN: 9.3 G/DL (ref 14–18)
INR BLD: 1.5
LYMPHOCYTES ABSOLUTE: 0.6 K/UL (ref 1–4.8)
LYMPHOCYTES RELATIVE PERCENT: 7.4 %
MCH RBC QN AUTO: 28.4 PG (ref 27–31.3)
MCHC RBC AUTO-ENTMCNC: 32.2 % (ref 33–37)
MCV RBC AUTO: 88.1 FL (ref 79–92.2)
MONOCYTES ABSOLUTE: 0.5 K/UL (ref 0.2–0.8)
MONOCYTES RELATIVE PERCENT: 6.3 %
NEUTROPHILS ABSOLUTE: 7 K/UL (ref 1.4–6.5)
NEUTROPHILS RELATIVE PERCENT: 86.2 %
PDW BLD-RTO: 20.7 % (ref 11.5–14.5)
PERFORMED ON: ABNORMAL
PLATELET # BLD: 305 K/UL (ref 130–400)
POTASSIUM REFLEX MAGNESIUM: 3.6 MEQ/L (ref 3.4–4.9)
PROTHROMBIN TIME: 17.9 SEC (ref 12.3–14.9)
RBC # BLD: 3.27 M/UL (ref 4.7–6.1)
SODIUM BLD-SCNC: 142 MEQ/L (ref 135–144)
WBC # BLD: 8.1 K/UL (ref 4.8–10.8)

## 2023-03-09 PROCEDURE — 85025 COMPLETE CBC W/AUTO DIFF WBC: CPT

## 2023-03-09 PROCEDURE — 94664 DEMO&/EVAL PT USE INHALER: CPT

## 2023-03-09 PROCEDURE — 36415 COLL VENOUS BLD VENIPUNCTURE: CPT

## 2023-03-09 PROCEDURE — 6370000000 HC RX 637 (ALT 250 FOR IP): Performed by: INTERNAL MEDICINE

## 2023-03-09 PROCEDURE — 2700000000 HC OXYGEN THERAPY PER DAY

## 2023-03-09 PROCEDURE — 94640 AIRWAY INHALATION TREATMENT: CPT

## 2023-03-09 PROCEDURE — 2580000003 HC RX 258: Performed by: NURSE PRACTITIONER

## 2023-03-09 PROCEDURE — 2580000003 HC RX 258: Performed by: INTERNAL MEDICINE

## 2023-03-09 PROCEDURE — 80048 BASIC METABOLIC PNL TOTAL CA: CPT

## 2023-03-09 PROCEDURE — 94761 N-INVAS EAR/PLS OXIMETRY MLT: CPT

## 2023-03-09 PROCEDURE — 6360000002 HC RX W HCPCS: Performed by: INTERNAL MEDICINE

## 2023-03-09 PROCEDURE — 1210000000 HC MED SURG R&B

## 2023-03-09 PROCEDURE — 94150 VITAL CAPACITY TEST: CPT

## 2023-03-09 PROCEDURE — 99223 1ST HOSP IP/OBS HIGH 75: CPT | Performed by: INTERNAL MEDICINE

## 2023-03-09 PROCEDURE — 94667 MNPJ CHEST WALL 1ST: CPT

## 2023-03-09 PROCEDURE — 94668 MNPJ CHEST WALL SBSQ: CPT

## 2023-03-09 PROCEDURE — 85610 PROTHROMBIN TIME: CPT

## 2023-03-09 RX ORDER — FUROSEMIDE 10 MG/ML
40 INJECTION INTRAMUSCULAR; INTRAVENOUS ONCE
Status: DISCONTINUED | OUTPATIENT
Start: 2023-03-09 | End: 2023-03-18 | Stop reason: HOSPADM

## 2023-03-09 RX ORDER — SODIUM CHLORIDE 9 MG/ML
INJECTION, SOLUTION INTRAVENOUS PRN
Status: DISCONTINUED | OUTPATIENT
Start: 2023-03-09 | End: 2023-03-10

## 2023-03-09 RX ORDER — SODIUM CHLORIDE 0.9 % (FLUSH) 0.9 %
5-40 SYRINGE (ML) INJECTION EVERY 12 HOURS SCHEDULED
Status: DISCONTINUED | OUTPATIENT
Start: 2023-03-09 | End: 2023-03-10

## 2023-03-09 RX ORDER — ALBUTEROL SULFATE 90 UG/1
2 AEROSOL, METERED RESPIRATORY (INHALATION)
Status: DISCONTINUED | OUTPATIENT
Start: 2023-03-09 | End: 2023-03-18 | Stop reason: HOSPADM

## 2023-03-09 RX ORDER — ENOXAPARIN SODIUM 100 MG/ML
1 INJECTION SUBCUTANEOUS 2 TIMES DAILY
Status: DISCONTINUED | OUTPATIENT
Start: 2023-03-09 | End: 2023-03-12

## 2023-03-09 RX ORDER — CEFUROXIME AXETIL 500 MG/1
500 TABLET ORAL EVERY 12 HOURS SCHEDULED
Status: DISCONTINUED | OUTPATIENT
Start: 2023-03-09 | End: 2023-03-11

## 2023-03-09 RX ORDER — ASPIRIN 81 MG/1
81 TABLET ORAL DAILY
Status: DISCONTINUED | OUTPATIENT
Start: 2023-03-09 | End: 2023-03-18 | Stop reason: HOSPADM

## 2023-03-09 RX ORDER — METHOCARBAMOL 750 MG/1
750 TABLET, FILM COATED ORAL 3 TIMES DAILY
Status: DISCONTINUED | OUTPATIENT
Start: 2023-03-09 | End: 2023-03-18 | Stop reason: HOSPADM

## 2023-03-09 RX ORDER — SODIUM CHLORIDE 0.9 % (FLUSH) 0.9 %
5-40 SYRINGE (ML) INJECTION PRN
Status: DISCONTINUED | OUTPATIENT
Start: 2023-03-09 | End: 2023-03-10

## 2023-03-09 RX ORDER — LANOLIN ALCOHOL/MO/W.PET/CERES
3 CREAM (GRAM) TOPICAL NIGHTLY
Status: DISCONTINUED | OUTPATIENT
Start: 2023-03-09 | End: 2023-03-18 | Stop reason: HOSPADM

## 2023-03-09 RX ORDER — CLONAZEPAM 0.5 MG/1
0.25 TABLET ORAL EVERY 12 HOURS
Status: DISCONTINUED | OUTPATIENT
Start: 2023-03-09 | End: 2023-03-18 | Stop reason: HOSPADM

## 2023-03-09 RX ORDER — EZETIMIBE 10 MG/1
10 TABLET ORAL NIGHTLY
Status: DISCONTINUED | OUTPATIENT
Start: 2023-03-09 | End: 2023-03-18 | Stop reason: HOSPADM

## 2023-03-09 RX ORDER — TAMSULOSIN HYDROCHLORIDE 0.4 MG/1
0.4 CAPSULE ORAL NIGHTLY
Status: DISCONTINUED | OUTPATIENT
Start: 2023-03-09 | End: 2023-03-18 | Stop reason: HOSPADM

## 2023-03-09 RX ORDER — ATORVASTATIN CALCIUM 80 MG/1
80 TABLET, FILM COATED ORAL NIGHTLY
Status: DISCONTINUED | OUTPATIENT
Start: 2023-03-09 | End: 2023-03-18 | Stop reason: HOSPADM

## 2023-03-09 RX ORDER — FINASTERIDE 5 MG/1
5 TABLET, FILM COATED ORAL DAILY
Status: DISCONTINUED | OUTPATIENT
Start: 2023-03-09 | End: 2023-03-18 | Stop reason: HOSPADM

## 2023-03-09 RX ORDER — POLYETHYLENE GLYCOL 3350 17 G/17G
17 POWDER, FOR SOLUTION ORAL DAILY
Status: COMPLETED | OUTPATIENT
Start: 2023-03-09 | End: 2023-03-10

## 2023-03-09 RX ORDER — FUROSEMIDE 10 MG/ML
40 INJECTION INTRAMUSCULAR; INTRAVENOUS ONCE
Status: COMPLETED | OUTPATIENT
Start: 2023-03-09 | End: 2023-03-09

## 2023-03-09 RX ORDER — SERTRALINE HYDROCHLORIDE 25 MG/1
75 TABLET, FILM COATED ORAL DAILY
Status: DISCONTINUED | OUTPATIENT
Start: 2023-03-09 | End: 2023-03-18 | Stop reason: HOSPADM

## 2023-03-09 RX ORDER — LACTULOSE 10 G/15ML
20 SOLUTION ORAL ONCE
Status: COMPLETED | OUTPATIENT
Start: 2023-03-09 | End: 2023-03-09

## 2023-03-09 RX ORDER — PANTOPRAZOLE SODIUM 40 MG/1
40 TABLET, DELAYED RELEASE ORAL DAILY
Status: DISCONTINUED | OUTPATIENT
Start: 2023-03-09 | End: 2023-03-18 | Stop reason: HOSPADM

## 2023-03-09 RX ADMIN — FUROSEMIDE 40 MG: 10 INJECTION, SOLUTION INTRAMUSCULAR; INTRAVENOUS at 08:09

## 2023-03-09 RX ADMIN — Medication 10 ML: at 08:09

## 2023-03-09 RX ADMIN — POTASSIUM BICARBONATE 20 MEQ: 782 TABLET, EFFERVESCENT ORAL at 08:09

## 2023-03-09 RX ADMIN — LACTULOSE 20 G: 20 SOLUTION ORAL at 11:51

## 2023-03-09 RX ADMIN — ASPIRIN 81 MG: 81 TABLET, COATED ORAL at 10:04

## 2023-03-09 RX ADMIN — TAMSULOSIN HYDROCHLORIDE 0.4 MG: 0.4 CAPSULE ORAL at 20:06

## 2023-03-09 RX ADMIN — Medication 3 MG: at 20:06

## 2023-03-09 RX ADMIN — CLONAZEPAM 0.25 MG: 0.5 TABLET ORAL at 13:58

## 2023-03-09 RX ADMIN — Medication 10 ML: at 20:31

## 2023-03-09 RX ADMIN — GUAIFENESIN 600 MG: 600 TABLET, EXTENDED RELEASE ORAL at 08:08

## 2023-03-09 RX ADMIN — SERTRALINE HYDROCHLORIDE 75 MG: 50 TABLET ORAL at 11:50

## 2023-03-09 RX ADMIN — CEFUROXIME AXETIL 500 MG: 500 TABLET, FILM COATED ORAL at 20:06

## 2023-03-09 RX ADMIN — EZETIMIBE 10 MG: 10 TABLET ORAL at 20:06

## 2023-03-09 RX ADMIN — ALBUTEROL SULFATE 2 PUFF: 90 AEROSOL, METERED RESPIRATORY (INHALATION) at 07:35

## 2023-03-09 RX ADMIN — METHOCARBAMOL 750 MG: 500 TABLET ORAL at 13:58

## 2023-03-09 RX ADMIN — BENZONATATE 100 MG: 100 CAPSULE ORAL at 10:05

## 2023-03-09 RX ADMIN — ENOXAPARIN SODIUM 40 MG: 100 INJECTION SUBCUTANEOUS at 08:09

## 2023-03-09 RX ADMIN — FINASTERIDE 5 MG: 5 TABLET, FILM COATED ORAL at 11:50

## 2023-03-09 RX ADMIN — PANTOPRAZOLE SODIUM 40 MG: 40 TABLET, DELAYED RELEASE ORAL at 11:50

## 2023-03-09 RX ADMIN — ALBUTEROL SULFATE 2 PUFF: 90 AEROSOL, METERED RESPIRATORY (INHALATION) at 14:19

## 2023-03-09 RX ADMIN — DEXAMETHASONE SODIUM PHOSPHATE 6 MG: 10 INJECTION INTRAMUSCULAR; INTRAVENOUS at 08:09

## 2023-03-09 RX ADMIN — ALBUTEROL SULFATE 2 PUFF: 90 AEROSOL, METERED RESPIRATORY (INHALATION) at 19:49

## 2023-03-09 RX ADMIN — METHOCARBAMOL 750 MG: 500 TABLET ORAL at 20:06

## 2023-03-09 RX ADMIN — ATORVASTATIN CALCIUM 80 MG: 80 TABLET, FILM COATED ORAL at 20:06

## 2023-03-09 RX ADMIN — POLYETHYLENE GLYCOL 3350 17 G: 17 POWDER, FOR SOLUTION ORAL at 11:51

## 2023-03-09 RX ADMIN — LEVOFLOXACIN 750 MG: 5 INJECTION, SOLUTION INTRAVENOUS at 00:04

## 2023-03-09 RX ADMIN — GUAIFENESIN 600 MG: 600 TABLET, EXTENDED RELEASE ORAL at 20:06

## 2023-03-09 ASSESSMENT — PAIN DESCRIPTION - ORIENTATION: ORIENTATION: MID

## 2023-03-09 ASSESSMENT — PAIN DESCRIPTION - DESCRIPTORS: DESCRIPTORS: ACHING

## 2023-03-09 ASSESSMENT — PAIN DESCRIPTION - LOCATION: LOCATION: ABDOMEN

## 2023-03-09 ASSESSMENT — PAIN SCALES - GENERAL
PAINLEVEL_OUTOF10: 2
PAINLEVEL_OUTOF10: 5
PAINLEVEL_OUTOF10: 0

## 2023-03-09 ASSESSMENT — ENCOUNTER SYMPTOMS
CHEST TIGHTNESS: 0
WHEEZING: 0
EYES NEGATIVE: 1
SHORTNESS OF BREATH: 1
COUGH: 1
BLOOD IN STOOL: 0
STRIDOR: 0
GASTROINTESTINAL NEGATIVE: 1
NAUSEA: 0

## 2023-03-09 NOTE — PROGRESS NOTES
[facility] EMERGENCY [facility] AT [place] Respiratory Therapy Evaluation   Current Order:  ALBUTEROL HFA QID      Home Regimen: QID      Ordering Physician: RUTH  Re-evaluation Date:  3/12     Diagnosis: HYPOXIA      Patient Status: Stable / Unstable + Physician notified    The following MDI Criteria must be met in order to convert aerosol to MDI with spacer.  If unable to meet, MDI will be converted to aerosol:  []  Patient able to demonstrate the ability to use MDI effectively  []  Patient alert and cooperative  []  Patient able to take deep breath with 5-10 second hold  []  Medication(s) available in this delivery method   []  Peak flow greater than or equal to 200 ml/min            Current Order Substituted To  (same drug, same frequency)   Aerosol to MDI [] Albuterol Sulfate 0.083% unit dose by aerosol Albuterol Sulfate MDI 2 puffs by inhalation with spacer    [] Levalbuterol 1.25 mg unit dose by aerosol Levalbuterol MDI 2 puffs by inhalation with spacer    [] Levalbuterol 0.63 mg unit dose by aerosol Levalbuterol MDI 2 puffs by inhalation with spacer    [] Ipratropium Bromide 0.02% unit dose by aerosol Ipratropium Bromide MDI 2 puffs by inhalation with spacer    [] Duoneb (Ipratropium + Albuterol) unit dose by aerosol Ipratropium MDI + Albuterol MDI 2 puffs by inhalation w/spacer   MDI to Aerosol [] Albuterol Sulfate MDI Albuterol Sulfate 0.083% unit dose by aerosol    [] Levalbuterol MDI 2 puffs by inhalation Levalbuterol 1.25 mg unit dose by aerosol    [] Ipratropium Bromide MDI by inhalation Ipratropium Bromide 0.02% unit dose by aerosol    [] Combivent (Ipratropium + Albuterol) MDI by inhalation Duoneb (Ipratropium + Albuterol) unit dose by aerosol       Treatment Assessment [Frequency/Schedule]:  Change frequency to: ALBUTEROL HFA QID AND Q2PRN__________________________________________________per Protocol, P&T, MEC      Points 0 1 2 3 4   Pulmonary Status  Non-Smoker  [x]   Smoking history   < 20 pack years  []   Smoking history  ?  20 pack years  []   Pulmonary Disorder  (acute or chronic)  []   Severe or Chronic w/ Exacerbation  []     Surgical Status No [x]   Surgeries     General []   Surgery Lower []   Abdominal Thoracic or []   Upper Abdominal Thoracic with  PulmonaryDisorder  []     Chest X-ray Clear/Not  Ordered     []  Chronic Changes  Results Pending  []  Infiltrates, atelectasis, pleural effusion, or edema  [x]  Infiltrates in more than one lobe []  Infiltrate + Atelectasis, &/or pleural effusion  []    Respiratory Pattern Regular,  RR = 12-20 [x]  Increased,  RR = 21-25 []  HUERTA, irregular,  or RR = 26-30 []  Decreased FEV1  or RR = 31-35 []  Severe SOB, use  of accessory muscles, or RR ? 35  []    Mental Status Alert, oriented,  Cooperative [x]  Confused but Follows commands []  Lethargic or unable to follow commands []  Obtunded  []  Comatose  []    Breath Sounds Clear to  auscultation  []  Decreased unilaterally or  in bases only []  Decreased  bilaterally  [x]  Crackles or intermittent wheezes []  Wheezes []    Cough Strong, Spontan., & nonproductive [x]  Strong,  spontaneous, &  productive []  Weak,  Nonproductive []  Weak, productive or  with wheezes []  No spontaneous  cough or may require suctioning []    Level of Activity Ambulatory []  Ambulatory w/ Assist  [x]  Non-ambulatory []  Paraplegic []  Quadriplegic []    Total    Score:__5_____     Triage Score:_5_______      Tri       Triage:     1. (>20) Freq: Q3    2. (16-20) Freq: Q4   3. (11-15) Freq: QID & Albuterol Q2 PRN    4. (6-10) Freq: TID & Albuterol Q2 PRN    5. (0-5) Freq Q4prn

## 2023-03-09 NOTE — PROGRESS NOTES
Physician Progress Note    3/9/2023   11:53 AM    Name:  Leilani Carbajal  MRN:    [id number]     IP Day: 1     Admit Date: 3/8/2023  3:27 PM  PCP: Maria Elena Martin MD    Code Status:  Full Code    Subjective:     He continues to have cough and dyspnea but feels his breathing has improved. He does complain of constipation-it has been 2 days since his last BM.     Current Facility-Administered Medications   Medication Dose Route Frequency Provider Last Rate Last Admin    albuterol sulfate HFA (PROVENTIL;VENTOLIN;PROAIR) 108 (90 Base) MCG/ACT inhaler 2 puff  2 puff Inhalation Q2H PRN [facility] Gonzalez, DO        enoxaparin (LOVENOX) injection 80 mg  1 mg/kg SubCUTAneous BID Shannon Forde MD        furosemide (LASIX) injection 40 mg  40 mg IntraVENous Once Shannon Forde MD        aspirin EC tablet 81 mg  81 mg Oral Daily Shannon Forde MD   81 mg at 03/09/23 1004    atorvastatin (LIPITOR) tablet 80 mg  80 mg Oral Nightly Shannon Forde MD        polyethylene glycol (GLYCOLAX) packet 17 g  17 g Oral Daily [facility], DO   17 g at 03/09/23 1151    ezetimibe (ZETIA) tablet 10 mg  10 mg Oral Nightly [facility] Gonzalez, DO        finasteride (PROSCAR) tablet 5 mg  5 mg Oral Daily [facility], DO   5 mg at 03/09/23 1150    melatonin tablet 3 mg  3 mg Oral Nightly [facility] Gonzalez, DO        methocarbamol (ROBAXIN) tablet 750 mg  750 mg Oral TID [facility], DO        pantoprazole (PROTONIX) tablet 40 mg  40 mg Oral Daily [facility], DO   40 mg at 03/09/23 1150    sertraline (ZOLOFT) tablet 75 mg  75 mg Oral Daily [facility], DO   75 mg at 03/09/23 1150    tamsulosin (FLOMAX) capsule 0.4 mg  0.4 mg Oral Nightly Radha Cortez, DO        0.9 % sodium chloride bolus  1,000 mL IntraVENous Once Juan Gonzalez, DO   Held at 03/08/23 1628    sodium chloride flush 0.9 % injection 5-40 mL  5-40 mL IntraVENous 2 times per day [facility], DO   10 mL at 03/09/23 0809    sodium chloride flush 0.9 % injection 5-40 mL 5-40 mL IntraVENous PRN Gokul Campos, DO        0.9 % sodium chloride infusion   IntraVENous PRN Gokul Campos, DO        ondansetron (ZOFRAN-ODT) disintegrating tablet 4 mg  4 mg Oral Q8H PRN Gokul Campos, DO        Or    [facility]ron [facility]) injection 4 mg  4 mg IntraVENous Q6H PRN Gokul Campos, DO        polyethylene glycol (GLYCOLAX) packet 17 g  17 g Oral Daily PRN Gokul Campos, DO        acetaminophen (TYLENOL) tablet 650 mg  650 mg Oral Q6H PRN Gokul Campos, DO        Or    acetaminophen (TYLENOL) suppository 650 mg  650 mg Rectal Q6H PRN Gokul Campos, DO        dexamethasone (DECADRON) injection 6 mg  6 mg IntraVENous Daily Gokul Campos, DO   6 mg at 23 0809    levoFLOXacin (LEVAQUIN) 750 MG/150ML infusion 750 mg  750 mg IntraVENous Q24H Gokul Campos, DO   Stopped at 23 0125    guaiFENesin [facility]) extended release tablet 600 mg  600 mg Oral BID Gokul Campos, DO   600 mg at 23 8090    benzonatate (TESSALON) capsule 100 mg  100 mg Oral TID PRN Gokul Campos, DO   100 mg at 23 1005    guaiFENesin-dextromethorphan (ROBITUSSIN DM) 100-10 MG/5ML syrup 5 mL  5 mL Oral Q4H PRN Gokul Campos, DO        albuterol sulfate HFA (PROVENTIL;VENTOLIN;PROAIR) 108 (90 Base) MCG/ACT inhaler 2 puff  2 puff Inhalation 4x daily Gokul Campos, DO   2 puff at 23 0735       Physical Examination:      Vitals:  /62   Pulse 92   Temp 98.2 °F (36.8 °C) (Oral)   Resp 24   Ht 5' 11\" (1.803 m)   Wt 180 lb (81.6 kg)   SpO2 97%   BMI 25.10 kg/m²   Temp (24hrs), Av.5 °F (36.9 °C), Min:98.2 °F (36.8 °C), Max:98.6 °F (37 °C)      General appearance: alert, cooperative and no distress. Elderly and chronically ill-appearing but able to have normal conversation. Oriented to person, hospital, and month.   Lungs: Diminished on the left, normal effort  Heart: regular rate and rhythm, no murmur  Abdomen: soft, nontender, nondistended, bowel sounds present, no masses  Extremities: Trace pitting edema. No redness, tenderness in the calves. Cap refill <2s  Skin: no gross lesions, rashes    Data:     Labs:  Recent Labs     03/08/23  1600 03/08/23  1615 03/09/23  0611   WBC 11.6*  --  8.1   HGB 9.1* 11.2* 9.3*     --  305     Recent Labs     03/08/23  1747 03/09/23  0611    142   K 3.9 3.6    101   CO2 28 32*   BUN 49* 48*   CREATININE 1.18 1.17   GLUCOSE 121* 103*     Recent Labs     03/08/23  1747   AST 49*   ALT 18   BILITOT 0.7   ALKPHOS 37       Assessment and Plan:        1. Acute respiratory failure with hypoxia suspect multifactorial etiology: Atelectasis versus mucous plugging on the left. Also component of CHF, COVID-19 pneumonia, possible superimposed bacterial pneumonia  -Continue Decadron   -Noted prolonged QTc. Will transition to p.o. cefuroxime to complete empiric course. Noted negative procalcitonin.   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

## 2023-03-09 NOTE — CONSULTS
Pulmonary and Critical Care Medicine  Consult Note  Encounter Date: 3/9/2023 1:22 PM    Mr. Peyton Mane is a 76 y.o. male  : 1945  Requesting Provider: Concepcion Delacruz DO    Reason for request: COVID-19 infection            HISTORY OF PRESENT ILLNESS:    Patient is 76 y.o. presents with worsening shortness of breath for almost 4 days, no fever, he has cough productive of yellow phlegm, no hemoptysis, no chest pain, no dysphagia or aspiration, no nausea no vomiting. Patient never smoked, he is not on oxygen at home he does use CPAP and compliant with treatment. Patient complains of anxiety and panic attacks        Past Medical History:        Diagnosis Date    Atrial fibrillation ([facility] [address]     Benign prostate hyperplasia     Hyperlipemia     Kidney disease     [facility]     Sleep apnea     Stroke [facility])     Urinary retention        Past Surgical History:        Procedure Laterality Date    PACEMAKER PLACEMENT N/A        Social History:     reports that he has never smoked. He has never used smokeless tobacco. He reports that he does not drink alcohol and does not use drugs. Family History:   History reviewed. No pertinent family history.   No family history of lung disease  Allergies:  Penicillins, Sulfamethoxazole, Tapentadol, and Trimethoprim        MEDICATIONS during current hospitalization:    Continuous Infusions:   sodium chloride         Scheduled Meds:   enoxaparin  1 mg/kg SubCUTAneous BID    furosemide  40 mg IntraVENous Once    aspirin  81 mg Oral Daily    atorvastatin  80 mg Oral Nightly    polyethylene glycol  17 g Oral Daily    ezetimibe  10 mg Oral Nightly    finasteride  5 mg Oral Daily    melatonin  3 mg Oral Nightly    methocarbamol  750 mg Oral TID    pantoprazole  40 mg Oral Daily    sertraline  75 mg Oral Daily    tamsulosin  0.4 mg Oral Nightly    cefUROXime  500 mg Oral 2 times per day    sodium chloride  1,000 mL IntraVENous Once    sodium chloride flush  5-40 mL IntraVENous 2 times per day    dexamethasone  6 mg IntraVENous Daily    guaiFENesin  600 mg Oral BID    albuterol sulfate HFA  2 puff Inhalation 4x daily       PRN Meds:albuterol sulfate HFA, trimethobenzamide, sodium chloride flush, sodium chloride, polyethylene glycol, acetaminophen **OR** acetaminophen, benzonatate, guaiFENesin-dextromethorphan        REVIEW OF SYSTEMS:  ROS: 10 organs review of system is done including general, psychological, ENT, hematological, endocrine, respiratory, cardiovascular, gastrointestinal, musculoskeletal, neurological,  allergy and Immunology is done and is otherwise negative. PHYSICAL EXAM:    Vitals:  /60   Pulse 80   Temp 98.2 °F (36.8 °C) (Oral)   Resp 24   Ht 5' 11\" (1.803 m)   Wt 180 lb (81.6 kg)   SpO2 100%   BMI 25.10 kg/m²     General: alert, cooperative, no distress  Head: normocephalic, atraumatic  Eyes:No gross abnormalities. ENT:  MMM no lesions  Neck:  supple and no masses  Chest : Good air movement, bilateral rales, no wheezing, nontender, tympanic  Heart[de-identified] Heart sounds are normal.  Regular rate and rhythm without murmur, gallop or rub. ABD:  symmetric, soft, non-tender, no guarding or rebound  Musculoskeletal : no cyanosis, no clubbing, and no edema  Neuro:  Grossly normal  Skin: No rashes or nodules noted. Lymph node:  no cervical nodes  Urology: No Lyons   Psychiatric: appropriate        Data Review  Recent Labs     03/08/23  1342 03/08/23  1600 03/08/23  1615 03/09/23  0611   WBC 9.2 11.6*  --  8.1   HGB 9.1* 9.1* 11.2* 9.3*   HCT 28.7* 29.0*  --  28.8*    358  --  305      Recent Labs     03/08/23  1342 03/08/23  1615 03/08/23  1702 03/08/23  1747 03/09/23  0611     --   --  138 142   K 4.3  --   --  3.9 3.6     --   --  101 101   CO2 26  --   --  28 32*   BUN 53*  --   --  49* 48*   CREATININE 1.36*   < > 1.4* 1.18 1.17   GLUCOSE 80  --   --  121* 103*    < > = values in this interval not displayed.        MV Settings: ABGs:   Recent Labs     03/08/23  1615   PHART 7.346*   XUG1ZIU 53*   PO2ART 76*   PEB0SZP 29.2*   BEART 4*   [id number] 94*   [id number] 31     O2 Device: Nasal cannula  O2 Flow Rate (L/min): 3 L/min  Lab Results   Component Value Date/Time    LACTA 1.0 03/08/2023 04:00 PM       Radiology  I personally reviewed imaging studies and left lower lobe lung collapse with mucous plugging, pleural plaques, and bilateral pleural effusion        Assessment, plan:   Patient is at risk due to    Acute on chronic hypoxic and hypercapnic respiratory failure  Likely combination of COVID-19 infection, mucous plugging and lung collapse and possible postobstructive pneumonia  Left lower lobe lung collapse secondary to mucous plugging  Chronic pleural plaques and bilateral pleural effusion  5 mm lung nodule, in light of asbestos exposure will need 1 year follow-up CT  Demand ischemia  Anxiety     Recommendation  Bronchoscopy tomorrow for airway clearance and BAL  Vest 3 times daily  Incentive spirometry and flutter device  Continue Rocephin  Continue dexamethasone  O2 to keep sat 93 to 95%  Watch volume status, avoid overload  Klonopin 0.25 twice daily, adjust dose as tolerated  Discussed with patient risks include but not limited to bleeding, infection, lung collapse , cardiac arrhythmia, need for other procedures or allergy to med, benefits and alternatives discussed with patient.  Patient  agrees to proceed with procedure         Thank you for consultation    Electronically signed by Dank Kaba MD, [facility],  on 3/9/2023 at 1:22 PM

## 2023-03-09 NOTE — PROGRESS NOTES
Shift assessment complete. VSS. A&Ox4. Pt calm and cooperative. Confused at times. Lung sounds are diminished: Rhonchi and wheezing present. On 3L NC. Dyspnea present on exertion. Incentive spirometer at bedside: Pt educated on how to use. Moist productive cough present. Yellow Sputum present. PRN Tessalon and mucinex given. On tele. Abdomen is soft and round. Bowel sounds are active. Pt complains of being constipated. Glycolax and lactulose given. Pt states he has not had a BM in 4 days. BLE edema present 1+. Pulses palpable. Falls precautions in place. Call light in reach. AIR cushion placed under buttocks and pt repositioned at tolerated. HOB elevated. Buttocks red. Skin is dry and flaky: Bruising and scabs present. No further needs at this time.      Electronically signed by Nanda Jackson RN on 3/9/2023 at 2:42 PM

## 2023-03-09 NOTE — CONSULTS
Inpatient consult to Cardiology  Consult performed by: Jenna Acosta MD  Consult ordered by: Harriet Noe DO        Patient Name: Tahira Lora Date: 3/8/2023  3:27 PM  MR #: [id number]  : 1945    Attending Physician: Harriet Noe DO  Reason for consult: Elevated BNP and Troponin    History of Presenting Illness:      Marcia Vera is a 76 y.o. male on hospital day 1 with a history of . History Obtained From:  patient, electronic medical record    Admitted w respiratory failure. Dx w COVID 6 days ago. Pt is coughing up very thick yellow sputum. He has significant cough. He is SOB. He has been at [facility] and transferred to [facility] respiratory distress. He does have Bio AVR and MVR psot IE .    He had CABG. 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 Was on Warfarin but due to inability to maintain therapeutic INR was change to Eliquis. He sees [facility]. He has a PPM for CHB. He has Mild LE Edema and Lung sounds are course and diminished. History:      EKG:  V Paced 105 w underlying SR  Past Medical History:   Diagnosis Date    Atrial fibrillation ([facility] [address]     Benign prostate hyperplasia     Hyperlipemia     Kidney disease     Prostate cancer ([facility] [address]     Sleep apnea     Stroke [facility])     Urinary retention      Past Surgical History:   Procedure Laterality Date    PACEMAKER PLACEMENT N/A        Family History  History reviewed. No pertinent family history.   [] Unable to obtain due to ventilated and/ or neurologic status    Social History     Socioeconomic History    Marital status:      Spouse name: Not on file    Number of children: Not on file    Years of education: Not on file    Highest education level: Not on file   Occupational History    Not on file   Tobacco Use    Smoking status: Never    Smokeless tobacco: Never   Substance and Sexual Activity    Alcohol use: Never    Drug use: Never    Sexual activity: Not on file   Other Topics Concern    Not on file Social History Narrative    Not on file     Social Determinants of Health     Financial Resource Strain: Not on file   Food Insecurity: Not on file   Transportation Needs: Not on file   Physical Activity: Not on file   Stress: Not on file   Social Connections: Not on file   Intimate Partner Violence: Not on file   Housing Stability: Not on file      [] Unable to obtain due to ventilated and/ or neurologic status      Home Medications:      Medications Prior to Admission: Aspirin 81 MG CAPS, Take 81 mg by mouth daily  fenofibrate (TRICOR) 145 MG tablet, Take 145 mg by mouth  ferrous sulfate 324 (65 Fe) MG EC tablet, Take 324 mg by mouth  gabapentin (NEURONTIN) 600 MG tablet, Take 600 mg by mouth in the morning, at noon, and at bedtime. magnesium hydroxide (MILK OF MAGNESIA) 400 MG/5ML suspension, Take 30 mLs by mouth daily  Pantoprazole Sodium (PROTONIX PO), Take 40 mg by mouth daily  sertraline (ZOLOFT) 25 MG tablet, Take 75 mg by mouth daily  Vitamin D, Ergocalciferol, 37600 units CAPS, Take 1 tablet by mouth daily  acetaminophen (TYLENOL) 500 MG tablet, Take 2 tablets by mouth every 8 hours as needed  atorvastatin (LIPITOR) 40 MG tablet, Take 1 tablet by mouth daily  enoxaparin (LOVENOX) 40 MG/0.4ML, Inject 40 mg into the skin daily  ezetimibe (ZETIA) 10 MG tablet, Take 10 mg by mouth at bedtime  finasteride (PROSCAR) 5 MG tablet, Take 5 mg by mouth daily  guaiFENesin (MUCINEX) 600 MG extended release tablet, Take 1 tablet by mouth in the morning and at bedtime  HYDROcodone-acetaminophen (NORCO) 5-325 MG per tablet, Take 1 tablet by mouth every 6 hours as needed.   ipratropium-albuterol (DUONEB) 0.5-2.5 (3) MG/3ML SOLN nebulizer solution, Inhale 1 vial into the lungs 4 times daily  levoFLOXacin (LEVAQUIN) 750 MG tablet, Take 1 tablet by mouth daily  melatonin 3 MG TABS tablet, Take 1 tablet by mouth at bedtime  mirabegron (MYRBETRIQ) 50 MG TB24, Take 50 mg by mouth daily  predniSONE (DELTASONE) 20 MG tablet, Take 40 mg by mouth daily  tamsulosin (FLOMAX) 0.4 MG capsule, Take 0.4 mg by mouth at bedtime  methocarbamol (ROBAXIN) 750 MG tablet, Take 750 mg by mouth 3 times daily  furosemide (LASIX) 40 MG tablet, Take 40 mg by mouth daily  tadalafil (CIALIS) 5 MG tablet, Take 5 mg by mouth daily  [DISCONTINUED] Methocarbamol 1000 MG TABS, Take 750 mg by mouth 3 times daily    Current Hospital Medications:     Scheduled Meds:   enoxaparin  1 mg/kg SubCUTAneous BID    furosemide  40 mg IntraVENous Once    aspirin  81 mg Oral Daily    atorvastatin  80 mg Oral Nightly    sodium chloride  1,000 mL IntraVENous Once    sodium chloride flush  5-40 mL IntraVENous 2 times per day    dexamethasone  6 mg IntraVENous Daily    levofloxacin  750 mg IntraVENous Q24H    guaiFENesin  600 mg Oral BID    albuterol sulfate HFA  2 puff Inhalation 4x daily     Continuous Infusions:   sodium chloride       PRN Meds:.albuterol sulfate HFA, sodium chloride flush, sodium chloride, ondansetron **OR** ondansetron, polyethylene glycol, acetaminophen **OR** acetaminophen, benzonatate, guaiFENesin-dextromethorphan  .   sodium chloride          Allergies: Allergies   Allergen Reactions    Penicillins     Sulfamethoxazole Hives    Tapentadol Hives    Trimethoprim Hives        Review of Systems:       Review of Systems   Constitutional:  Positive for fatigue. Negative for diaphoresis. HENT: Negative. Eyes: Negative. Respiratory:  Positive for cough and shortness of breath. Negative for chest tightness, wheezing and stridor. Cardiovascular: Negative. Negative for chest pain, palpitations and leg swelling. Gastrointestinal: Negative. Negative for blood in stool and nausea. Genitourinary: Negative. Musculoskeletal: Negative. Skin: Negative. Neurological:  Positive for weakness. Negative for dizziness, syncope and light-headedness. Hematological: Negative. Psychiatric/Behavioral: Negative.          Objective Findings: Vitals:/62   Pulse 92   Temp 98.2 °F (36.8 °C) (Oral)   Resp 24   Ht 5' 11\" (1.803 m)   Wt 180 lb (81.6 kg)   SpO2 97%   BMI 25.10 kg/m²      Physical Examination:    Physical Exam   Constitutional: No distress. He appears chronically ill and acutely ill. HENT:   Normal cephalic and Atraumatic   Eyes: Pupils are equal, round, and reactive to light. Neck: Thyroid normal. No JVD present. No neck adenopathy. No thyromegaly present. Cardiovascular: Regular rhythm, intact distal pulses and normal pulses. Tachycardia present. Murmur heard. Pulmonary/Chest: Effort normal and breath sounds normal. He has no wheezes. He has no rales. He exhibits no tenderness. Abdominal: Soft. Bowel sounds are normal. There is no abdominal tenderness. Musculoskeletal:         General: Edema present. No tenderness. Normal range of motion. Cervical back: Normal range of motion and neck supple. Neurological: He is alert and oriented to person, place, and time. Skin: Skin is warm. No cyanosis. Nails show no clubbing.        Results/ Medications reviewed 3/9/2023, 9:48 AM     Laboratory, Microbiology, Pathology, Radiology, Cardiology, Medications and Transcriptions reviewed  Scheduled Meds:   enoxaparin  1 mg/kg SubCUTAneous BID    furosemide  40 mg IntraVENous Once    aspirin  81 mg Oral Daily    atorvastatin  80 mg Oral Nightly    sodium chloride  1,000 mL IntraVENous Once    sodium chloride flush  5-40 mL IntraVENous 2 times per day    dexamethasone  6 mg IntraVENous Daily    levofloxacin  750 mg IntraVENous Q24H    guaiFENesin  600 mg Oral BID    albuterol sulfate HFA  2 puff Inhalation 4x daily     Continuous Infusions:   sodium chloride         Recent Labs     03/08/23  1342 03/08/23  1600 03/08/23  1615 03/09/23  0611   WBC 9.2 11.6*  --  8.1   HGB 9.1* 9.1* 11.2* 9.3*   HCT 28.7* 29.0*  --  28.8*   MCV 90.2 91.7  --  88.1    358  --  305     Recent Labs     03/08/23  1342 03/08/23  1615 03/08/23  1702 03/08/23  1747 03/09/23  0611     --   --  138 142   K 4.3  --   --  3.9 3.6     --   --  101 101   CO2 26  --   --  28 32*   BUN 53*  --   --  49* 48*   CREATININE 1.36*   < > 1.4* 1.18 1.17    < > = values in this interval not displayed. Recent Labs     03/08/23 1747   AST 49*   ALT 18   BILITOT 0.7   ALKPHOS 37     No results for input(s): LIPASE, AMYLASE in the last 72 hours. Recent Labs     03/08/23 1747   PROT 6.8     CTA CHEST W WO CONTRAST PE Eval    Result Date: 3/8/2023  EXAMINATION: CTA OF THE CHEST WITH AND WITHOUT CONTRAST 3/8/2023 5:33 pm TECHNIQUE: CTA of the chest was performed before and after the administration of intravenous contrast.  Multiplanar reformatted images are provided for review. MIP images are provided for review. Automated exposure control, iterative reconstruction, and/or weight based adjustment of the mA/kV was utilized to reduce the radiation dose to as low as reasonably achievable. COMPARISON: None. HISTORY: ORDERING SYSTEM PROVIDED HISTORY: PE TECHNOLOGIST PROVIDED HISTORY: Reason for exam:->PE Decision Support Exception - unselect if not a suspected or confirmed emergency medical condition->Emergency Medical Condition (MA) What reading provider will be dictating this exam?->CRC FINDINGS: Pulmonary Arteries: Pulmonary arteries are adequately opacified for evaluation. No evidence of intraluminal filling defect to suggest pulmonary embolism. Main pulmonary artery is mildly prominent. Pulmonary arterial hypertension cannot be excluded. . Mediastinum: No evidence of mediastinal lymphadenopathy. The heart and pericardium demonstrate no acute abnormality. There is no acute abnormality of the thoracic aorta. There is mild aneurysmal dilatation of the ascending aorta which measures 4 cm. Lungs/pleura: 5 mm right upper lobe pulmonary nodule is noted (axial image 22). Atelectasis versus scarring is noted in the right lower lobe.   There is right pleural calcification and pleural thickening which may be due to asbestos related pleural disease. There is volume loss in the left lung with mediastinal shift to the left. Consolidated left lung may reflect atelectasis or chronic scarring. There is pleural calcification and thickening in the left hemithorax which may rib reflect asbestos related pleural disease or chronic changes from remote hematoma or granulomatous disease such as tuberculosis. Upper Abdomen: Low-density lesions in the right kidney are likely cysts. 9 mm calcification in the left kidney and 1 cm calcification in the right kidney are consistent with renal calculi. Small calcified gallstones are noted. Soft Tissues/Bones: Post sternotomy changes are noted. Chronic T12 compression deformity is noted. There is fusion hardware in the visualized upper lumbar spine. 1. Negative study for pulmonary embolism. 2. Chronic scarring versus atelectasis in the left lung. 3. Bilateral pleural calcifications which may be due to asbestos related pleural disease, chronic hematomas or granulomatous disease. 4. Prominence of the main pulmonary arteries which may indicate pulmonary arterial hypertension. 5. Bilateral nephrolithiasis. 6. Cholelithiasis. 7. 5 mm right upper lobe pulmonary nodule. RECOMMENDATIONS: Fleischner Society guidelines for follow-up and management of incidentally detected pulmonary nodules: Single Solid Nodule: Nodule size less than 6 mm In a low-risk patient, no routine follow-up. In a high-risk patient, optional CT at 12 months. - Low risk patients include individuals with minimal or absent history of smoking and other known risk factors. - High risk patients include individuals with a history or smoking or known risk factors. Radiology 2017 http://pubs. rsna.org/doi/full/10.1148/radiol. 1735939194     XR CHEST PORTABLE    Result Date: 3/8/2023  EXAMINATION: ONE XRAY VIEW OF THE CHEST 3/8/2023 4:47 pm COMPARISON: None.  HISTORY: ORDERING SYSTEM PROVIDED HISTORY: sob TECHNOLOGIST PROVIDED HISTORY: Reason for exam:->sob What reading provider will be dictating this exam?->CRC FINDINGS: The lungs are without acute focal process. Small right pleural effusion. Probable left pleural effusion. No pneumothorax. Cardiomegaly. Left-sided transvenous pacer device, prosthetic heart valve and sternotomy wires noted. The osseous structures are without acute process. Right pleural effusion. Probable left pleural effusion. Cardiomegaly. Active Hospital Problems    Diagnosis Date Noted    Acute respiratory failure with hypoxia ([facility] [address] [J96.01] 03/08/2023     Priority: Medium         Impression/Plan:   COVID PNA - significant cough and thick yellow sputum - iv Decadron. Supportive care. IS. Chest Vest  PAF - advance Lovenox to full dose. Change to Eliquis when stable  BIO MVR and AVR post IE   CABG- add ASA and statin. BB when BP mores stable. HPL Statin low fat diet  PPM CHB      Thank you for allowing us to participate in the care of this patient. Will continue to follow. Please call if questions or concerns arise.     Electronically signed by Zohaib Martinze MD on 3/9/2023 at 9:48 AM

## 2023-03-09 NOTE — CARE COORDINATION
Patient from [facility], not a bedhold but will follow per Lawson Jones with [facility]. PLAN to return when medically stable.

## 2023-03-09 NOTE — PROGRESS NOTES
Spiritual Care Services     Summary of Visit:   visited patient on 2W. Patient sitting up in bed. Patient said she wanted  wanted to pray for his life.  provided prayer for patient to be healed and to recover. Encounter Summary  Encounter Overview/Reason : Initial Encounter  Service Provided For[de-identified] Patient  Referral/Consult From[de-identified] Rounding  Support System: Spouse, Children  Complexity of Encounter: Moderate  Begin Time: 1500  End Time : 1515  Total Time Calculated: 15 min  Encounter   Type: Initial Screen/Assessment     Spiritual/Emotional needs  Type: Spiritual Support                      Spiritual Assessment/Intervention/Outcomes:    Assessment: Anxious, Other (Comment) (Worried)    Intervention: Empowerment, Nurtured Hope, Prayer (assurance of)/[place]    Outcome: Connection/Belonging, Expressed Gratitude      Care Plan:    Plan and Referrals  Plan/Referrals: Continue Support (comment)          Spiritual Care Services   Electronically signed by Chaplain Emmanuel on 3/9/2023 at 3:32 PM.    To reach a  for emotional and spiritual support, place an [facility] consult request.   If a  is needed immediately, dial 0 and ask to page the on-call .

## 2023-03-09 NOTE — CARE COORDINATION
[facility] Case Management Initial Discharge Assessment    If patient is discharged prior to next notation, then this note serves as note for discharge by case management. Met with Patient to discuss discharge plan. Pt is from [facility] and states that he plans to return there after d/c. [facility] not called regarding bed status due to time of day.     Readmission Risk              Risk of Unplanned Readmission:  9         Electronically signed by Cele López RN on 3/8/2023 at 8:15 PM

## 2023-03-09 NOTE — H&P
Hospital Medicine  History and Physical    Patient:  Chris Sampson  MRN: [id number]    CHIEF COMPLAINT:    Chief Complaint   Patient presents with    Shortness of Breath     Pt was diagnosed with COVID 1 week ago and has had increased SOB and work of breathing. Pt was a wet cough noted. Pt on 4 L NC. Pt given 1 du neb and 125 mg solumedrol PTA. Pt is a/ox4.        History Obtained From:  Patient, EMR  Primary Care Physician: Yuan Rizo MD    HISTORY OF PRESENT ILLNESS:   77-year-old male presents from [facility] with worsening dyspnea and hypoxia.  He was diagnosed with COVID-19 approximately 6 days ago.  He admits to worsening dyspnea and cough.  He had been treated with prednisone, molnupiravir, and Levaquin.    He denies any obvious fever, chills, chest pain, abdominal pain, change in bowels or urination.  He tells me he has a bad memory due to prior TIA/CVA but feels he has been at [facility] since the turn of the year.  He is bed/wheelchair bound at baseline.    Past Medical History:  No past medical history on file.    Past Surgical History:  No past surgical history on file.    Medications Prior to Admission:    Prior to Admission medications    Not on File       Allergies:  Penicillins, Sulfamethoxazole, Tapentadol, and Trimethoprim    Social History:   TOBACCO:   has no history on file for tobacco use.  ETOH:   has no history on file for alcohol use.      Family History:   No family history on file.    REVIEW OF SYSTEMS:  Ten systems reviewed and negative except for stated in HPI    Physical Exam:    Vitals: /72   Pulse 94   Temp 98.6 °F (37 °C)   Resp 17   Ht 5' 11\" (1.803 m)   Wt 180 lb (81.6 kg)   SpO2 96%   BMI 25.10 kg/m²   General appearance: Elderly and chronically ill-appearing.  He is oriented to person, hospital (he did not know city), and month.  He is pale  Skin: Skin color, texture, turgor normal. No rashes or lesions  HEENT: eomi, perrla. MMM  Neck: No  JVD or lymphadenopathy  Lungs: Diminished breath sounds in particular on the left  Heart: RRR, no murmur or gallp  Abdomen: soft, nontender. Bsx4. No masses or organomegaly  Extremities: 1+ pitting edema bilaterally  Neurologic: No focal deficits     Recent Labs     03/06/23  0720 03/08/23  1600 03/08/23  1615   WBC 4.6*  4.7* 11.6*  --    HGB 9.0*  8.8* 9.1* 11.2*     234 358  --      Recent Labs     03/06/23  0720 03/08/23  1615 03/08/23  1702 03/08/23  1747     140  --   --  138   K 4.4  4.3  --   --  3.9     108*  --   --  101   CO2 26  24  --   --  28   BUN 47*  47*  --   --  49*   CREATININE 1.39*  1.40* 1.3 1.4* 1.18   GLUCOSE 93  91  --   --  121*   AST  --   --   --  49*   ALT  --   --   --  18   BILITOT  --   --   --  0.7   ALKPHOS  --   --   --  37     Troponin T:   Recent Labs     03/08/23  1600   TROPONINI 0.017*       ABGs:   Lab Results   Component Value Date/Time    PHART 7.346 03/08/2023 04:15 PM    PO2ART 76 03/08/2023 04:15 PM    YQR6RNU 53 03/08/2023 04:15 PM     INR: No results for input(s): INR in the last 72 hours. URINALYSIS:No results for input(s): NITRITE, COLORU, PHUR, LABCAST, WBCUA, RBCUA, MUCUS, TRICHOMONAS, YEAST, BACTERIA, CLARITYU, SPECGRAV, LEUKOCYTESUR, UROBILINOGEN, BILIRUBINUR, BLOODU, GLUCOSEU, AMORPHOUS in the last 72 hours. Invalid input(s): Rose Kurtz  -----------------------------------------------------------------   CTA CHEST W WO CONTRAST PE Eval    Result Date: 3/8/2023  EXAMINATION: CTA OF THE CHEST WITH AND WITHOUT CONTRAST 3/8/2023 5:33 pm TECHNIQUE: CTA of the chest was performed before and after the administration of intravenous contrast.  Multiplanar reformatted images are provided for review. MIP images are provided for review. Automated exposure control, iterative reconstruction, and/or weight based adjustment of the mA/kV was utilized to reduce the radiation dose to as low as reasonably achievable. COMPARISON: None.  HISTORY: ORDERING SYSTEM PROVIDED HISTORY: PE TECHNOLOGIST PROVIDED HISTORY: Reason for exam:->PE Decision Support Exception - unselect if not a suspected or confirmed emergency medical condition->Emergency Medical Condition (MA) What reading provider will be dictating this exam?->CRC FINDINGS: Pulmonary Arteries: Pulmonary arteries are adequately opacified for evaluation. No evidence of intraluminal filling defect to suggest pulmonary embolism. Main pulmonary artery is mildly prominent. Pulmonary arterial hypertension cannot be excluded. . Mediastinum: No evidence of mediastinal lymphadenopathy. The heart and pericardium demonstrate no acute abnormality. There is no acute abnormality of the thoracic aorta. There is mild aneurysmal dilatation of the ascending aorta which measures 4 cm. Lungs/pleura: 5 mm right upper lobe pulmonary nodule is noted (axial image 22). Atelectasis versus scarring is noted in the right lower lobe. There is right pleural calcification and pleural thickening which may be due to asbestos related pleural disease. There is volume loss in the left lung with mediastinal shift to the left. Consolidated left lung may reflect atelectasis or chronic scarring. There is pleural calcification and thickening in the left hemithorax which may rib reflect asbestos related pleural disease or chronic changes from remote hematoma or granulomatous disease such as tuberculosis. Upper Abdomen: Low-density lesions in the right kidney are likely cysts. 9 mm calcification in the left kidney and 1 cm calcification in the right kidney are consistent with renal calculi. Small calcified gallstones are noted. Soft Tissues/Bones: Post sternotomy changes are noted. Chronic T12 compression deformity is noted. There is fusion hardware in the visualized upper lumbar spine. 1. Negative study for pulmonary embolism. 2. Chronic scarring versus atelectasis in the left lung.  3. Bilateral pleural calcifications which may be due to asbestos related pleural disease, chronic hematomas or granulomatous disease. 4. Prominence of the main pulmonary arteries which may indicate pulmonary arterial hypertension. 5. Bilateral nephrolithiasis. 6. Cholelithiasis. 7. 5 mm right upper lobe pulmonary nodule. RECOMMENDATIONS: Fleischner Society guidelines for follow-up and management of incidentally detected pulmonary nodules: Single Solid Nodule: Nodule size less than 6 mm In a low-risk patient, no routine follow-up. In a high-risk patient, optional CT at 12 months. - Low risk patients include individuals with minimal or absent history of smoking and other known risk factors. - High risk patients include individuals with a history or smoking or known risk factors. Radiology 2017 http://pubs.rsna.org/doi/full/10.1148/radiol.3297983239     XR CHEST PORTABLE    Result Date: 3/8/2023  EXAMINATION: ONE XRAY VIEW OF THE CHEST 3/8/2023 4:47 pm COMPARISON: None. HISTORY: ORDERING SYSTEM PROVIDED HISTORY: sob TECHNOLOGIST PROVIDED HISTORY: Reason for exam:->sob What reading provider will be dictating this exam?->CRC FINDINGS: The lungs are without acute focal process.  Small right pleural effusion. Probable left pleural effusion.  No pneumothorax.  Cardiomegaly.  Left-sided transvenous pacer device, prosthetic heart valve and sternotomy wires noted. The osseous structures are without acute process.     Right pleural effusion.  Probable left pleural effusion. Cardiomegaly.           Assessment and Plan     1.  Acute respiratory failure with hypoxia suspect multifactorial etiology: Atelectasis versus mucous plugging on the left.  Also component of CHF, COVID-19 pneumonia, possible superimposed bacterial pneumonia  -Continue Decadron and Levaquin  -Supportive respiratory care and pulmonary toilet  -TTE  -Consider further IV diuresis-currently blood pressure is soft.  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      Sánchez James DO  Admitting Hospitalist

## 2023-03-10 ENCOUNTER — ANESTHESIA EVENT (OUTPATIENT)
Dept: OPERATING ROOM | Age: 78
End: 2023-03-10
Payer: MEDICARE

## 2023-03-10 ENCOUNTER — APPOINTMENT (OUTPATIENT)
Dept: GENERAL RADIOLOGY | Age: 78
DRG: 177 | End: 2023-03-10
Payer: MEDICARE

## 2023-03-10 ENCOUNTER — ANESTHESIA (OUTPATIENT)
Dept: OPERATING ROOM | Age: 78
End: 2023-03-10
Payer: MEDICARE

## 2023-03-10 LAB
ANION GAP SERPL CALCULATED.3IONS-SCNC: 6 MEQ/L (ref 9–15)
BASOPHILS ABSOLUTE: 0 K/UL (ref 0–0.2)
BASOPHILS RELATIVE PERCENT: 0.1 %
BUN BLDV-MCNC: 46 MG/DL (ref 8–23)
CALCIUM SERPL-MCNC: 9 MG/DL (ref 8.5–9.9)
CHLORIDE BLD-SCNC: 102 MEQ/L (ref 95–107)
CO2: 33 MEQ/L (ref 20–31)
CREAT SERPL-MCNC: 1.05 MG/DL (ref 0.7–1.2)
EKG ATRIAL RATE: 54 BPM
EKG ATRIAL RATE: 78 BPM
EKG Q-T INTERVAL: 444 MS
EKG Q-T INTERVAL: 486 MS
EKG QRS DURATION: 180 MS
EKG QRS DURATION: 192 MS
EKG QTC CALCULATION (BAZETT): 586 MS
EKG QTC CALCULATION (BAZETT): 597 MS
EKG R AXIS: -82 DEGREES
EKG R AXIS: 95 DEGREES
EKG T AXIS: -80 DEGREES
EKG T AXIS: 81 DEGREES
EKG VENTRICULAR RATE: 105 BPM
EKG VENTRICULAR RATE: 91 BPM
EOSINOPHILS ABSOLUTE: 0 K/UL (ref 0–0.7)
EOSINOPHILS RELATIVE PERCENT: 0.2 %
GFR SERPL CREATININE-BSD FRML MDRD: >60 ML/MIN/{1.73_M2}
GLUCOSE BLD-MCNC: 111 MG/DL (ref 70–99)
HCT VFR BLD CALC: 28.7 % (ref 42–52)
HEMOGLOBIN: 9.1 G/DL (ref 14–18)
LYMPHOCYTES ABSOLUTE: 0.8 K/UL (ref 1–4.8)
LYMPHOCYTES RELATIVE PERCENT: 7.2 %
MAGNESIUM: 2.3 MG/DL (ref 1.7–2.4)
MCH RBC QN AUTO: 28.2 PG (ref 27–31.3)
MCHC RBC AUTO-ENTMCNC: 31.8 % (ref 33–37)
MCV RBC AUTO: 88.7 FL (ref 79–92.2)
MONOCYTES ABSOLUTE: 0.8 K/UL (ref 0.2–0.8)
MONOCYTES RELATIVE PERCENT: 7.3 %
NEUTROPHILS ABSOLUTE: 9.5 K/UL (ref 1.4–6.5)
NEUTROPHILS RELATIVE PERCENT: 85.2 %
PDW BLD-RTO: 21.1 % (ref 11.5–14.5)
PLATELET # BLD: 278 K/UL (ref 130–400)
POTASSIUM REFLEX MAGNESIUM: 3.4 MEQ/L (ref 3.4–4.9)
RBC # BLD: 3.24 M/UL (ref 4.7–6.1)
SODIUM BLD-SCNC: 141 MEQ/L (ref 135–144)
WBC # BLD: 11.2 K/UL (ref 4.8–10.8)

## 2023-03-10 PROCEDURE — 2580000003 HC RX 258: Performed by: INTERNAL MEDICINE

## 2023-03-10 PROCEDURE — 85025 COMPLETE CBC W/AUTO DIFF WBC: CPT

## 2023-03-10 PROCEDURE — 31645 BRNCHSC W/THER ASPIR 1ST: CPT | Performed by: INTERNAL MEDICINE

## 2023-03-10 PROCEDURE — 94640 AIRWAY INHALATION TREATMENT: CPT

## 2023-03-10 PROCEDURE — 87102 FUNGUS ISOLATION CULTURE: CPT

## 2023-03-10 PROCEDURE — 0B9G8ZX DRAINAGE OF LEFT UPPER LUNG LOBE, VIA NATURAL OR ARTIFICIAL OPENING ENDOSCOPIC, DIAGNOSTIC: ICD-10-PCS | Performed by: INTERNAL MEDICINE

## 2023-03-10 PROCEDURE — 87070 CULTURE OTHR SPECIMN AEROBIC: CPT

## 2023-03-10 PROCEDURE — 6370000000 HC RX 637 (ALT 250 FOR IP): Performed by: INTERNAL MEDICINE

## 2023-03-10 PROCEDURE — 99232 SBSQ HOSP IP/OBS MODERATE 35: CPT | Performed by: INTERNAL MEDICINE

## 2023-03-10 PROCEDURE — 86403 PARTICLE AGGLUT ANTBDY SCRN: CPT

## 2023-03-10 PROCEDURE — 88305 TISSUE EXAM BY PATHOLOGIST: CPT

## 2023-03-10 PROCEDURE — 2700000000 HC OXYGEN THERAPY PER DAY

## 2023-03-10 PROCEDURE — 1210000000 HC MED SURG R&B

## 2023-03-10 PROCEDURE — 2580000003 HC RX 258: Performed by: STUDENT IN AN ORGANIZED HEALTH CARE EDUCATION/TRAINING PROGRAM

## 2023-03-10 PROCEDURE — 6360000002 HC RX W HCPCS: Performed by: REGISTERED NURSE

## 2023-03-10 PROCEDURE — 31624 DX BRONCHOSCOPE/LAVAGE: CPT | Performed by: INTERNAL MEDICINE

## 2023-03-10 PROCEDURE — 2709999900 HC NON-CHARGEABLE SUPPLY: Performed by: INTERNAL MEDICINE

## 2023-03-10 PROCEDURE — 7100000000 HC PACU RECOVERY - FIRST 15 MIN: Performed by: INTERNAL MEDICINE

## 2023-03-10 PROCEDURE — 80048 BASIC METABOLIC PNL TOTAL CA: CPT

## 2023-03-10 PROCEDURE — A4217 STERILE WATER/SALINE, 500 ML: HCPCS | Performed by: INTERNAL MEDICINE

## 2023-03-10 PROCEDURE — 88112 CYTOPATH CELL ENHANCE TECH: CPT

## 2023-03-10 PROCEDURE — 87186 SC STD MICRODIL/AGAR DIL: CPT

## 2023-03-10 PROCEDURE — 94761 N-INVAS EAR/PLS OXIMETRY MLT: CPT

## 2023-03-10 PROCEDURE — 71045 X-RAY EXAM CHEST 1 VIEW: CPT

## 2023-03-10 PROCEDURE — 7100000011 HC PHASE II RECOVERY - ADDTL 15 MIN: Performed by: INTERNAL MEDICINE

## 2023-03-10 PROCEDURE — 36415 COLL VENOUS BLD VENIPUNCTURE: CPT

## 2023-03-10 PROCEDURE — 2500000003 HC RX 250 WO HCPCS: Performed by: INTERNAL MEDICINE

## 2023-03-10 PROCEDURE — 93005 ELECTROCARDIOGRAM TRACING: CPT | Performed by: EMERGENCY MEDICINE

## 2023-03-10 PROCEDURE — 7100000010 HC PHASE II RECOVERY - FIRST 15 MIN: Performed by: INTERNAL MEDICINE

## 2023-03-10 PROCEDURE — 87116 MYCOBACTERIA CULTURE: CPT

## 2023-03-10 PROCEDURE — 0B9J8ZX DRAINAGE OF LEFT LOWER LUNG LOBE, VIA NATURAL OR ARTIFICIAL OPENING ENDOSCOPIC, DIAGNOSTIC: ICD-10-PCS | Performed by: INTERNAL MEDICINE

## 2023-03-10 PROCEDURE — 89051 BODY FLUID CELL COUNT: CPT

## 2023-03-10 PROCEDURE — 2500000003 HC RX 250 WO HCPCS: Performed by: REGISTERED NURSE

## 2023-03-10 PROCEDURE — 6360000002 HC RX W HCPCS: Performed by: INTERNAL MEDICINE

## 2023-03-10 PROCEDURE — 3700000000 HC ANESTHESIA ATTENDED CARE: Performed by: INTERNAL MEDICINE

## 2023-03-10 PROCEDURE — 7100000001 HC PACU RECOVERY - ADDTL 15 MIN: Performed by: INTERNAL MEDICINE

## 2023-03-10 PROCEDURE — 87205 SMEAR GRAM STAIN: CPT

## 2023-03-10 PROCEDURE — 3609027000 HC BRONCHOSCOPY: Performed by: INTERNAL MEDICINE

## 2023-03-10 PROCEDURE — 93010 ELECTROCARDIOGRAM REPORT: CPT | Performed by: INTERNAL MEDICINE

## 2023-03-10 PROCEDURE — 83735 ASSAY OF MAGNESIUM: CPT

## 2023-03-10 PROCEDURE — 3700000001 HC ADD 15 MINUTES (ANESTHESIA): Performed by: INTERNAL MEDICINE

## 2023-03-10 RX ORDER — DIPHENHYDRAMINE HYDROCHLORIDE 50 MG/ML
12.5 INJECTION INTRAMUSCULAR; INTRAVENOUS
Status: DISCONTINUED | OUTPATIENT
Start: 2023-03-10 | End: 2023-03-10 | Stop reason: HOSPADM

## 2023-03-10 RX ORDER — PROCHLORPERAZINE EDISYLATE 5 MG/ML
5 INJECTION INTRAMUSCULAR; INTRAVENOUS
Status: DISCONTINUED | OUTPATIENT
Start: 2023-03-10 | End: 2023-03-10 | Stop reason: HOSPADM

## 2023-03-10 RX ORDER — FENTANYL CITRATE 0.05 MG/ML
50 INJECTION, SOLUTION INTRAMUSCULAR; INTRAVENOUS EVERY 5 MIN PRN
Status: DISCONTINUED | OUTPATIENT
Start: 2023-03-10 | End: 2023-03-10 | Stop reason: HOSPADM

## 2023-03-10 RX ORDER — ETOMIDATE 2 MG/ML
INJECTION INTRAVENOUS PRN
Status: DISCONTINUED | OUTPATIENT
Start: 2023-03-10 | End: 2023-03-10 | Stop reason: SDUPTHER

## 2023-03-10 RX ORDER — SODIUM CHLORIDE 0.9 % (FLUSH) 0.9 %
5-40 SYRINGE (ML) INJECTION EVERY 12 HOURS SCHEDULED
Status: DISCONTINUED | OUTPATIENT
Start: 2023-03-10 | End: 2023-03-10 | Stop reason: HOSPADM

## 2023-03-10 RX ORDER — OXYCODONE HYDROCHLORIDE 5 MG/1
5 TABLET ORAL PRN
Status: DISCONTINUED | OUTPATIENT
Start: 2023-03-10 | End: 2023-03-10 | Stop reason: HOSPADM

## 2023-03-10 RX ORDER — SODIUM CHLORIDE 9 MG/ML
25 INJECTION, SOLUTION INTRAVENOUS PRN
Status: DISCONTINUED | OUTPATIENT
Start: 2023-03-10 | End: 2023-03-10 | Stop reason: HOSPADM

## 2023-03-10 RX ORDER — HYDRALAZINE HYDROCHLORIDE 20 MG/ML
10 INJECTION INTRAMUSCULAR; INTRAVENOUS
Status: DISCONTINUED | OUTPATIENT
Start: 2023-03-10 | End: 2023-03-10 | Stop reason: HOSPADM

## 2023-03-10 RX ORDER — SODIUM CHLORIDE 0.9 % (FLUSH) 0.9 %
5-40 SYRINGE (ML) INJECTION PRN
Status: DISCONTINUED | OUTPATIENT
Start: 2023-03-10 | End: 2023-03-10 | Stop reason: HOSPADM

## 2023-03-10 RX ORDER — LIDOCAINE HYDROCHLORIDE 20 MG/ML
INJECTION, SOLUTION EPIDURAL; INFILTRATION; INTRACAUDAL; PERINEURAL PRN
Status: DISCONTINUED | OUTPATIENT
Start: 2023-03-10 | End: 2023-03-10 | Stop reason: ALTCHOICE

## 2023-03-10 RX ORDER — MEPERIDINE HYDROCHLORIDE 25 MG/ML
12.5 INJECTION INTRAMUSCULAR; INTRAVENOUS; SUBCUTANEOUS EVERY 5 MIN PRN
Status: DISCONTINUED | OUTPATIENT
Start: 2023-03-10 | End: 2023-03-10 | Stop reason: HOSPADM

## 2023-03-10 RX ORDER — SODIUM CHLORIDE, SODIUM LACTATE, POTASSIUM CHLORIDE, CALCIUM CHLORIDE 600; 310; 30; 20 MG/100ML; MG/100ML; MG/100ML; MG/100ML
INJECTION, SOLUTION INTRAVENOUS CONTINUOUS
Status: DISCONTINUED | OUTPATIENT
Start: 2023-03-10 | End: 2023-03-10

## 2023-03-10 RX ORDER — MAGNESIUM HYDROXIDE 1200 MG/15ML
LIQUID ORAL CONTINUOUS PRN
Status: COMPLETED | OUTPATIENT
Start: 2023-03-10 | End: 2023-03-10

## 2023-03-10 RX ORDER — ONDANSETRON 2 MG/ML
4 INJECTION INTRAMUSCULAR; INTRAVENOUS
Status: DISCONTINUED | OUTPATIENT
Start: 2023-03-10 | End: 2023-03-10 | Stop reason: HOSPADM

## 2023-03-10 RX ORDER — LABETALOL HYDROCHLORIDE 5 MG/ML
10 INJECTION, SOLUTION INTRAVENOUS
Status: DISCONTINUED | OUTPATIENT
Start: 2023-03-10 | End: 2023-03-10 | Stop reason: HOSPADM

## 2023-03-10 RX ORDER — OXYCODONE HYDROCHLORIDE 5 MG/1
10 TABLET ORAL PRN
Status: DISCONTINUED | OUTPATIENT
Start: 2023-03-10 | End: 2023-03-10 | Stop reason: HOSPADM

## 2023-03-10 RX ORDER — FENTANYL CITRATE 0.05 MG/ML
25 INJECTION, SOLUTION INTRAMUSCULAR; INTRAVENOUS EVERY 5 MIN PRN
Status: DISCONTINUED | OUTPATIENT
Start: 2023-03-10 | End: 2023-03-10 | Stop reason: HOSPADM

## 2023-03-10 RX ORDER — ROCURONIUM BROMIDE 10 MG/ML
INJECTION, SOLUTION INTRAVENOUS PRN
Status: DISCONTINUED | OUTPATIENT
Start: 2023-03-10 | End: 2023-03-10 | Stop reason: SDUPTHER

## 2023-03-10 RX ORDER — ONDANSETRON 2 MG/ML
INJECTION INTRAMUSCULAR; INTRAVENOUS PRN
Status: DISCONTINUED | OUTPATIENT
Start: 2023-03-10 | End: 2023-03-10 | Stop reason: SDUPTHER

## 2023-03-10 RX ADMIN — Medication 3 MG: at 19:44

## 2023-03-10 RX ADMIN — CEFUROXIME AXETIL 500 MG: 500 TABLET, FILM COATED ORAL at 19:45

## 2023-03-10 RX ADMIN — PANTOPRAZOLE SODIUM 40 MG: 40 TABLET, DELAYED RELEASE ORAL at 11:28

## 2023-03-10 RX ADMIN — ROCURONIUM BROMIDE 40 MG: 10 INJECTION, SOLUTION INTRAVENOUS at 09:08

## 2023-03-10 RX ADMIN — SERTRALINE HYDROCHLORIDE 75 MG: 50 TABLET ORAL at 11:28

## 2023-03-10 RX ADMIN — Medication 10 ML: at 19:46

## 2023-03-10 RX ADMIN — ALBUTEROL SULFATE 2 PUFF: 90 AEROSOL, METERED RESPIRATORY (INHALATION) at 16:42

## 2023-03-10 RX ADMIN — CEFUROXIME AXETIL 500 MG: 500 TABLET, FILM COATED ORAL at 11:28

## 2023-03-10 RX ADMIN — METHOCARBAMOL 750 MG: 500 TABLET ORAL at 11:27

## 2023-03-10 RX ADMIN — DEXAMETHASONE SODIUM PHOSPHATE 6 MG: 10 INJECTION INTRAMUSCULAR; INTRAVENOUS at 11:29

## 2023-03-10 RX ADMIN — ONDANSETRON 4 MG: 2 INJECTION INTRAMUSCULAR; INTRAVENOUS at 09:08

## 2023-03-10 RX ADMIN — METHOCARBAMOL 750 MG: 500 TABLET ORAL at 19:44

## 2023-03-10 RX ADMIN — SODIUM CHLORIDE, POTASSIUM CHLORIDE, SODIUM LACTATE AND CALCIUM CHLORIDE: 600; 310; 30; 20 INJECTION, SOLUTION INTRAVENOUS at 07:13

## 2023-03-10 RX ADMIN — ALBUTEROL SULFATE 2 PUFF: 90 AEROSOL, METERED RESPIRATORY (INHALATION) at 10:45

## 2023-03-10 RX ADMIN — EZETIMIBE 10 MG: 10 TABLET ORAL at 19:45

## 2023-03-10 RX ADMIN — GUAIFENESIN 600 MG: 600 TABLET, EXTENDED RELEASE ORAL at 11:28

## 2023-03-10 RX ADMIN — BENZONATATE 100 MG: 100 CAPSULE ORAL at 11:28

## 2023-03-10 RX ADMIN — ALBUTEROL SULFATE 2 PUFF: 90 AEROSOL, METERED RESPIRATORY (INHALATION) at 20:46

## 2023-03-10 RX ADMIN — ETOMIDATE INJECTION 16 MG: 2 SOLUTION INTRAVENOUS at 09:06

## 2023-03-10 RX ADMIN — GUAIFENESIN 600 MG: 600 TABLET, EXTENDED RELEASE ORAL at 19:44

## 2023-03-10 RX ADMIN — FINASTERIDE 5 MG: 5 TABLET, FILM COATED ORAL at 11:28

## 2023-03-10 RX ADMIN — ATORVASTATIN CALCIUM 80 MG: 80 TABLET, FILM COATED ORAL at 19:44

## 2023-03-10 RX ADMIN — CLONAZEPAM 0.25 MG: 0.5 TABLET ORAL at 14:01

## 2023-03-10 RX ADMIN — POLYETHYLENE GLYCOL 3350 17 G: 17 POWDER, FOR SOLUTION ORAL at 11:29

## 2023-03-10 RX ADMIN — TAMSULOSIN HYDROCHLORIDE 0.4 MG: 0.4 CAPSULE ORAL at 19:44

## 2023-03-10 RX ADMIN — ASPIRIN 81 MG: 81 TABLET, COATED ORAL at 11:28

## 2023-03-10 RX ADMIN — Medication 10 ML: at 11:29

## 2023-03-10 RX ADMIN — SUGAMMADEX 200 MG: 100 INJECTION, SOLUTION INTRAVENOUS at 09:24

## 2023-03-10 ASSESSMENT — ENCOUNTER SYMPTOMS
NAUSEA: 0
BLOOD IN STOOL: 0
SHORTNESS OF BREATH: 1
COUGH: 1
SHORTNESS OF BREATH: 1
GASTROINTESTINAL NEGATIVE: 1
CHEST TIGHTNESS: 0
STRIDOR: 0
EYES NEGATIVE: 1
WHEEZING: 0

## 2023-03-10 ASSESSMENT — PAIN DESCRIPTION - ORIENTATION: ORIENTATION: MID

## 2023-03-10 ASSESSMENT — PAIN DESCRIPTION - PAIN TYPE: TYPE: CHRONIC PAIN

## 2023-03-10 ASSESSMENT — PAIN SCALES - GENERAL
PAINLEVEL_OUTOF10: 0

## 2023-03-10 ASSESSMENT — PAIN DESCRIPTION - DESCRIPTORS: DESCRIPTORS: ACHING

## 2023-03-10 ASSESSMENT — PAIN DESCRIPTION - FREQUENCY: FREQUENCY: CONTINUOUS

## 2023-03-10 ASSESSMENT — PAIN DESCRIPTION - LOCATION: LOCATION: ABDOMEN

## 2023-03-10 NOTE — PROGRESS NOTES
Physician Progress Note    3/10/2023   6:31 PM    Name:  Kash Espinosa  MRN:    [id number]      Day: 2     Admit Date: 3/8/2023  3:27 PM  PCP: Sherry Dockery MD    Code Status:  Full Code    Subjective:     He feels dyspnea has improved since bronchoscopy. No complaints at this time.     Current Facility-Administered Medications   Medication Dose Route Frequency Provider Last Rate Last Admin    albuterol sulfate HFA (PROVENTIL;VENTOLIN;PROAIR) 108 (90 Base) MCG/ACT inhaler 2 puff  2 puff Inhalation Q2H PRN Jamir Perez, DO        [Held by provider] enoxaparin (LOVENOX) injection 80 mg  1 mg/kg SubCUTAneous BID Malik Lopes MD        furosemide (LASIX) injection 40 mg  40 mg IntraVENous Once Malik Lopes MD        aspirin EC tablet 81 mg  81 mg Oral Daily Malik Lopes MD   81 mg at 03/10/23 1128    atorvastatin (LIPITOR) tablet 80 mg  80 mg Oral Nightly Malik Lopes MD   80 mg at 03/09/23 2006    ezetimibe (ZETIA) tablet 10 mg  10 mg Oral Nightly Jamir Lazier, DO   10 mg at 03/09/23 2006    finasteride (PROSCAR) tablet 5 mg  5 mg Oral Daily Jamir Lazier, DO   5 mg at 03/10/23 1128    melatonin tablet 3 mg  3 mg Oral Nightly Jamir Lazier, DO   3 mg at 03/09/23 2006    methocarbamol (ROBAXIN) tablet 750 mg  750 mg Oral TID Jamirmeghna rGadyier, DO   750 mg at 03/10/23 1127    pantoprazole (PROTONIX) tablet 40 mg  40 mg Oral Daily Jamir Lazier, DO   40 mg at 03/10/23 1128    sertraline (ZOLOFT) tablet 75 mg  75 mg Oral Daily Jamir Lazier, DO   75 mg at 03/10/23 1128    tamsulosin (FLOMAX) capsule 0.4 mg  0.4 mg Oral Nightly Jamir Lazier, DO   0.4 mg at 03/09/23 2006    cefUROXime (CEFTIN) tablet 500 mg  500 mg Oral 2 times per day Jamirmeghna Gradyier, DO   500 mg at 03/10/23 1128    trimethobenzamide (TIGAN) injection 200 mg  200 mg IntraMUSCular Q6H PRN Jamir Perez DO        clonazePAM (KLONOPIN) tablet 0.25 mg  0.25 mg Oral Q12H Fredy Lloyd MD   0.25 mg at 03/10/23 1401    0.9 % sodium chloride bolus  1,000 mL IntraVENous Once Lázaro Dub, DO   Held at 23 1628    sodium chloride flush 0.9 % injection 5-40 mL  5-40 mL IntraVENous 2 times per day Lázaro Dub, DO   10 mL at 03/10/23 1129    sodium chloride flush 0.9 % injection 5-40 mL  5-40 mL IntraVENous PRN Lázaro Dub, DO        0.9 % sodium chloride infusion   IntraVENous PRN Lázaro Dub, DO        polyethylene glycol (GLYCOLAX) packet 17 g  17 g Oral Daily PRN Lázaro Dub, DO        acetaminophen (TYLENOL) tablet 650 mg  650 mg Oral Q6H PRN Lázaro Dub, DO        Or    acetaminophen (TYLENOL) suppository 650 mg  650 mg Rectal Q6H PRN Lázaro Dub, DO        dexamethasone (DECADRON) injection 6 mg  6 mg IntraVENous Daily Lázaro Dub, DO   6 mg at 03/10/23 1129    guaiFENesin (MUCINEX) extended release tablet 600 mg  600 mg Oral BID Lázaro Dub, DO   600 mg at 03/10/23 1128    benzonatate (TESSALON) capsule 100 mg  100 mg Oral TID PRN Lázaro Dub, DO   100 mg at 03/10/23 1128    guaiFENesin-dextromethorphan (ROBITUSSIN DM) 100-10 MG/5ML syrup 5 mL  5 mL Oral Q4H PRN Lázaro Dub, DO        albuterol sulfate HFA (PROVENTIL;VENTOLIN;PROAIR) 108 (90 Base) MCG/ACT inhaler 2 puff  2 puff Inhalation 4x daily Lázaro Dub, DO   2 puff at 03/10/23 1642       Physical Examination:      Vitals:  /74   Pulse 90   Temp 98.2 °F (36.8 °C)   Resp 16   Ht 5' 11\" (1.803 m)   Wt 180 lb (81.6 kg)   SpO2 97%   BMI 25.10 kg/m²   Temp (24hrs), Av.6 °F (36.4 °C), Min:96.9 °F (36.1 °C), Max:98.2 °F (36.8 °C)      General appearance: alert, cooperative and no distress. Elderly and chronically ill-appearing but able to have normal conversation. Oriented to person, hospital, and month. Lungs: Diminished on the left, normal effort  Heart: regular rate and rhythm, no murmur  Abdomen: soft, nontender, nondistended, bowel sounds present, no masses  Extremities: Trace pitting edema.   No redness, tenderness in the calves. Cap refill <2s  Skin: no gross lesions, rashes    Data:     Labs:  Recent Labs     03/09/23  0611 03/10/23  0540   WBC 8.1 11.2*   HGB 9.3* 9.1*    278     Recent Labs     03/09/23  0611 03/10/23  0540    141   K 3.6 3.4    102   CO2 32* 33*   BUN 48* 46*   CREATININE 1.17 1.05   GLUCOSE 103* 111*     Recent Labs     03/08/23  1747   AST 49*   ALT 18   BILITOT 0.7   ALKPHOS 37       Assessment and Plan:        1.  Acute respiratory failure with hypoxia suspect primarily due to atelectasis related to mucous plugging.  S/p bronchoscopy on 3/10 with thick white secretions noted throughout tracheobronchial tree worse on the left with complete obstruction of left mainstem bronchus.  Mucosa easily bleeds on suction trauma.  Secretions were washed and cleaned  -Continue Decadron-may stop soon  -Transitioned to p.o. cefuroxime to complete empiric course.  Noted negative procalcitonin.  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  Possible return this weekend    37 minutes in total care time    Electronically signed by Peter Cortez DO on 3/10/2023 at 6:31 PM

## 2023-03-10 NOTE — CARE COORDINATION
This LSW called and spoke with spouse, Asha Kilpatrick. Discharge plans discussed- at this time patient will return to [facility]. Per Moni Kyle, admissions coordinator at [facility]- patient is being followed by [facility]. Patient will not require a pre-cert to transfer to [facility]. IMM completed with wife, Asha Kilpatrick. [id number] completed. LSW / CM to follow.   Electronically signed by RACHELL Delgadillo, LSW on 3/10/23 at 10:43 AM EST

## 2023-03-10 NOTE — ANESTHESIA PRE PROCEDURE
Department of Anesthesiology  Preprocedure Note       Name:  Jaiden Chi   Age:  76 y.o.  :  1945                                          MRN:  [id number]         Date:  3/10/2023      Surgeon: Elizabeth Query):  Ivan Duncan MD    Procedure: Procedure(s):  BRONCHOSCOPY AIRWAY CLEARANCE BRONCHOALVEOLAR  ROOM 492  15 MIN    Medications prior to admission:   Prior to Admission medications    Medication Sig Start Date End Date Taking? Authorizing Provider   Aspirin 81 MG CAPS Take 81 mg by mouth daily 23  Yes Historical Provider, MD   fenofibrate (TRICOR) 145 MG tablet Take 145 mg by mouth 22  Yes Historical Provider, MD   ferrous sulfate 324 (65 Fe) MG EC tablet Take 324 mg by mouth 22  Yes Historical Provider, MD   gabapentin (NEURONTIN) 600 MG tablet Take 600 mg by mouth in the morning, at noon, and at bedtime.  22  Yes Historical Provider, MD   magnesium hydroxide (MILK OF MAGNESIA) 400 MG/5ML suspension Take 30 mLs by mouth daily 23  Yes Historical Provider, MD   Pantoprazole Sodium (PROTONIX PO) Take 40 mg by mouth daily 22  Yes Historical Provider, MD   sertraline (ZOLOFT) 25 MG tablet Take 75 mg by mouth daily 3/3/23  Yes Historical Provider, MD   Vitamin D, Ergocalciferol, 98182 units CAPS Take 1 tablet by mouth daily 23  Yes Historical Provider, MD   acetaminophen (TYLENOL) 500 MG tablet Take 2 tablets by mouth every 8 hours as needed 9/11/15  Yes Historical Provider, MD   atorvastatin (LIPITOR) 40 MG tablet Take 1 tablet by mouth daily 11/15/21  Yes Historical Provider, MD   enoxaparin (LOVENOX) 40 MG/0.4ML Inject 40 mg into the skin daily 3/3/23  Yes Historical Provider, MD   ezetimibe (ZETIA) 10 MG tablet Take 10 mg by mouth at bedtime 22  Yes Historical Provider, MD   finasteride (PROSCAR) 5 MG tablet Take 5 mg by mouth daily 22  Yes Historical Provider, MD   guaiFENesin (MUCINEX) 600 MG extended release tablet Take 1 tablet by mouth in the morning and at bedtime 3/5/23  Yes Historical Provider, MD   HYDROcodone-acetaminophen (NORCO) 5-325 MG per tablet Take 1 tablet by mouth every 6 hours as needed.  1/13/23  Yes Historical Provider, MD   ipratropium-albuterol (Sarah Goodwill) 0.5-2.5 (3) MG/3ML SOLN nebulizer solution Inhale 1 vial into the lungs 4 times daily 3/6/23  Yes Historical Provider, MD   levoFLOXacin (LEVAQUIN) 750 MG tablet Take 1 tablet by mouth daily 3/3/23  Yes Historical Provider, MD   melatonin 3 MG TABS tablet Take 1 tablet by mouth at bedtime 3/3/23  Yes Historical Provider, MD   mirabegron (MYRBETRIQ) 50 MG TB24 Take 50 mg by mouth daily 8/23/21  Yes Historical Provider, MD   predniSONE (DELTASONE) 20 MG tablet Take 40 mg by mouth daily 3/3/23  Yes Historical Provider, MD   tamsulosin (FLOMAX) 0.4 MG capsule Take 0.4 mg by mouth at bedtime    Historical Provider, MD   methocarbamol (ROBAXIN) 750 MG tablet Take 750 mg by mouth 3 times daily    Historical Provider, MD   furosemide (LASIX) 40 MG tablet Take 40 mg by mouth daily 12/29/22   Historical Provider, MD   tadalafil (CIALIS) 5 MG tablet Take 5 mg by mouth daily 12/16/22   Historical Provider, MD       Current medications:    Current Facility-Administered Medications   Medication Dose Route Frequency Provider Last Rate Last Admin    lactated ringers IV soln infusion   IntraVENous Continuous Chelita Serna MD        lactated ringers IV soln infusion   IntraVENous Continuous Chelita Serna  mL/hr at 03/10/23 0713 New Bag at 03/10/23 0713    albuterol sulfate HFA (PROVENTIL;VENTOLIN;PROAIR) 108 (90 Base) MCG/ACT inhaler 2 puff  2 puff Inhalation Q2H PRN René Gallo DO        [Held by provider] enoxaparin (LOVENOX) injection 80 mg  1 mg/kg SubCUTAneous BID Mahogany Epstein MD        furosemide (LASIX) injection 40 mg  40 mg IntraVENous Once Mahogany Epstein MD        aspirin EC tablet 81 mg  81 mg Oral Daily Mahogany Epstein MD   81 mg at 03/09/23 1004    atorvastatin (LIPITOR) tablet 80 mg 80 mg Oral Nightly Ida Homans, MD   80 mg at 03/09/23 2006    polyethylene glycol (GLYCOLAX) packet 17 g  17 g Oral Daily Millie Gravel, DO   17 g at 03/09/23 1151    ezetimibe (ZETIA) tablet 10 mg  10 mg Oral Nightly Millie Gravel, DO   10 mg at 03/09/23 2006    finasteride (PROSCAR) tablet 5 mg  5 mg Oral Daily Millie Gravel, DO   5 mg at 03/09/23 1150    melatonin tablet 3 mg  3 mg Oral Nightly Millie Gravel, DO   3 mg at 03/09/23 2006    methocarbamol (ROBAXIN) tablet 750 mg  750 mg Oral TID Millie Gravel, DO   750 mg at 03/09/23 2006    pantoprazole (PROTONIX) tablet 40 mg  40 mg Oral Daily Millie Gravel, DO   40 mg at 03/09/23 1150    sertraline (ZOLOFT) tablet 75 mg  75 mg Oral Daily Millie Gravel, DO   75 mg at 03/09/23 1150    tamsulosin (FLOMAX) capsule 0.4 mg  0.4 mg Oral Nightly Mlilie Gravel, DO   0.4 mg at 03/09/23 2006    cefUROXime (CEFTIN) tablet 500 mg  500 mg Oral 2 times per day Millie Gravel, DO   500 mg at 03/09/23 2006    trimethobenzamide (TIGAN) injection 200 mg  200 mg IntraMUSCular Q6H PRN Millie Gravel, DO        clonazePAM (KLONOPIN) tablet 0.25 mg  0.25 mg Oral Q12H Necia Cheadle, MD   0.25 mg at 03/09/23 1358    sodium chloride flush 0.9 % injection 5-40 mL  5-40 mL IntraVENous 2 times per day CHIOMA Ramirez - CNP   10 mL at 03/09/23 2031    sodium chloride flush 0.9 % injection 5-40 mL  5-40 mL IntraVENous PRN CHIOMA Ramirez - CNP        0.9 % sodium chloride infusion   IntraVENous PRN CHIOMA Ramirez - CNP        0.9 % sodium chloride bolus  1,000 mL IntraVENous Once Millie Gravel, DO   Held at 03/08/23 1628    sodium chloride flush 0.9 % injection 5-40 mL  5-40 mL IntraVENous 2 times per day Millie Gravel, DO   10 mL at 03/09/23 2031    sodium chloride flush 0.9 % injection 5-40 mL  5-40 mL IntraVENous PRN Millie Gravel, DO        0.9 % sodium chloride infusion   IntraVENous PRN Millie Gravel, DO        polyethylene glycol (GLYCOLAX) packet 17 g  17 g Oral Daily PRN Negra La, DO        acetaminophen (TYLENOL) tablet 650 mg  650 mg Oral Q6H PRN Negra La, DO        Or    acetaminophen (TYLENOL) suppository 650 mg  650 mg Rectal Q6H PRN Negra La, DO        dexamethasone (DECADRON) injection 6 mg  6 mg IntraVENous Daily Negra La, DO   6 mg at 03/09/23 0809    guaiFENesin (MUCINEX) extended release tablet 600 mg  600 mg Oral BID Negra La, DO   600 mg at 03/09/23 2006    benzonatate (TESSALON) capsule 100 mg  100 mg Oral TID PRN Negra La, DO   100 mg at 03/09/23 1005    guaiFENesin-dextromethorphan (ROBITUSSIN DM) 100-10 MG/5ML syrup 5 mL  5 mL Oral Q4H PRN Negra La, DO        albuterol sulfate HFA (PROVENTIL;VENTOLIN;PROAIR) 108 (90 Base) MCG/ACT inhaler 2 puff  2 puff Inhalation 4x daily Negra La, DO   2 puff at 03/09/23 1949       Allergies:     Allergies   Allergen Reactions    Penicillins     Sulfamethoxazole Hives    Tapentadol Hives    Trimethoprim Hives       Problem List:    Patient Active Problem List   Diagnosis Code    Acute respiratory failure with hypoxia (HCC) J96.01    Hypoxia R09.02       Past Medical History:        Diagnosis Date    [facility] ([facility] [address]     Benign prostate hyperplasia     Hyperlipemia     Kidney disease     Prostate cancer ([facility] [address] [facility]     Sleep apnea     Stroke ([facility] [address] [facility]     Urinary retention        Past Surgical History:        Procedure Laterality Date    PACEMAKER PLACEMENT N/A        Social History:    Social History     Tobacco Use    Smoking status: Never    Smokeless tobacco: Never   Substance Use Topics    Alcohol use: Never                                Counseling given: No      Vital Signs (Current):   Vitals:    03/09/23 2004 03/10/23 0022 03/10/23 0422 03/10/23 0705   BP:  (!) 86/56 (!) 90/58 (!) 92/53   Pulse: (!) 107 86 84 84   Resp: 16   18   Temp:  97.7 °F (36.5 °C)     TempSrc:       SpO2: 100% 92% 93% 99% Weight:       Height:                                                  BP Readings from Last 3 Encounters:   03/10/23 (!) 92/53   02/22/23 102/70       NPO Status: Time of last liquid consumption: 2200                        Time of last solid consumption: 2200                        Date of last liquid consumption: 03/09/23                        Date of last solid food consumption: 03/09/23    BMI:   Wt Readings from Last 3 Encounters:   03/08/23 180 lb (81.6 kg)   02/22/23 200 lb (90.7 kg)     Body mass index is 25.1 kg/m².     CBC:   Lab Results   Component Value Date/Time    WBC 11.2 03/10/2023 05:40 AM    RBC 3.24 03/10/2023 05:40 AM    HGB 9.1 03/10/2023 05:40 AM    HCT 28.7 03/10/2023 05:40 AM    MCV 88.7 03/10/2023 05:40 AM    RDW 21.1 03/10/2023 05:40 AM     03/10/2023 05:40 AM       CMP:   Lab Results   Component Value Date/Time     03/10/2023 05:40 AM    K 3.4 03/10/2023 05:40 AM     03/10/2023 05:40 AM    CO2 33 03/10/2023 05:40 AM    BUN 46 03/10/2023 05:40 AM    CREATININE 1.05 03/10/2023 05:40 AM    LABGLOM >60.0 03/10/2023 05:40 AM    GLUCOSE 111 03/10/2023 05:40 AM    PROT 6.8 03/08/2023 05:47 PM    CALCIUM 9.0 03/10/2023 05:40 AM    BILITOT 0.7 03/08/2023 05:47 PM    ALKPHOS 37 03/08/2023 05:47 PM    AST 49 03/08/2023 05:47 PM    ALT 18 03/08/2023 05:47 PM       POC Tests:   Recent Labs     03/08/23  1615 03/09/23  1624   POCGLU 101* 126*   POCNA 140  --    POCK 3.7  --    POCCL 104  --    POCHCT 33*  --        Coags:   Lab Results   Component Value Date/Time    PROTIME 17.9 03/09/2023 02:19 PM    INR 1.5 03/09/2023 02:19 PM       HCG (If Applicable): No results found for: PREGTESTUR, PREGSERUM, HCG, HCGQUANT     ABGs:   Lab Results   Component Value Date/Time    PHART 7.346 03/08/2023 04:15 PM    PO2ART 76 03/08/2023 04:15 PM    JBC3OKM 53 03/08/2023 04:15 PM    FRS7PIY 29.2 03/08/2023 04:15 PM    BEART 4 03/08/2023 04:15 PM    Q4FVWHTA 94 03/08/2023 04:15 PM        Type & Screen (If Applicable):  No results found for: LABABO, LABRH    Drug/Infectious Status (If Applicable):  No results found for: HIV, HEPCAB    COVID-19 Screening (If Applicable):   Lab Results   Component Value Date/Time    COVID19 detected 03/02/2023 12:00 AM           Anesthesia Evaluation  Patient summary reviewed and Nursing notes reviewed no history of anesthetic complications:   Airway: Mallampati: II  TM distance: >3 FB   Neck ROM: full  Mouth opening: > = 3 FB   Dental: normal exam         Pulmonary:Negative Pulmonary ROS and normal exam    (+) pneumonia: unresolved,  shortness of breath:  recent URI:  sleep apnea:                             Cardiovascular:Negative CV ROS  Exercise tolerance: good (>4 METS),   (+) hypertension:, pacemaker: pacemaker and dependent, CAD:, CABG/stent:, dysrhythmias: atrial fibrillation and paced rhythm, CHF:, HUERTA:, hyperlipidemia      ECG reviewed          Cleared by cardiology     Beta Blocker:  Not on Beta Blocker      ROS comment: Complete heart block, s/p pacemaker. CAD s/p CABG 2006     Neuro/Psych:   Negative Neuro/Psych ROS  (+) CVA:,             GI/Hepatic/Renal: Neg GI/Hepatic/Renal ROS            Endo/Other: Negative Endo/Other ROS   (+) blood dyscrasia: anemia and anticoagulation therapy:., .          Pt had PAT visit. Abdominal:             Vascular: negative vascular ROS. Other Findings:           Anesthesia Plan      general     ASA 4     (ETT)  Induction: intravenous. MIPS: Postoperative opioids intended and Prophylactic antiemetics administered. Anesthetic plan and risks discussed with patient. Plan discussed with CRNA.     Attending anesthesiologist reviewed and agrees with Pre Eval content                Scott Kilgore MD   3/10/2023

## 2023-03-10 NOTE — PROGRESS NOTES
Patient ID:  Abdelrahman [facility]  [id number]  25 y.o.  1945  TAKEN TO PACU NEGATIVE PRESSURE ROOM,   ATTACHED TO MONITOR AND REPORT GIVEN TO RN.   VSS         Electronically signed by Alfreda Cho RN on 3/10/2023

## 2023-03-10 NOTE — PROGRESS NOTES
Progress Note      Patient Name: Mariya Ochoa Date: 3/8/2023  3:27 PM  MR #: [id number]  : 1945    Attending Physician: Ivan Mariee DO  Reason for consult: Elevated BNP and Troponin    History of Presenting Illness:      Cher Payne is a 76 y.o. male on hospital day 2 with a history of . History Obtained From:  patient, electronic medical record    Admitted w respiratory failure. Dx w COVID 6 days ago. Pt is coughing up very thick yellow sputum. He has significant cough. He is SOB. He has been at [facility] and transferred for worse respiratory distress. 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    He had CABG. 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 Was on Warfarin but due to inability to maintain therapeutic INR was change to Eliquis. He sees [facility]. He has a PPM for CHB. He has Mild LE Edema and Lung sounds are course and diminished. 3/10/23  no fever. Continues to cough up thick yellow sputum. Having coughing fits. On 4L O2 sats 92-99%   Hypotensive early in am.   History:      EKG:  V Paced 68 w underlying SR  Past Medical History:   Diagnosis Date    Atrial fibrillation ([facility] [address]     Benign prostate hyperplasia     Hyperlipemia     Kidney disease     Prostate cancer ([facility] [address]     Sleep apnea     Stroke [facility])     Urinary retention      Past Surgical History:   Procedure Laterality Date    PACEMAKER PLACEMENT N/A        Family History  History reviewed. No pertinent family history.   [] Unable to obtain due to ventilated and/ or neurologic status    Social History     Socioeconomic History    Marital status:      Spouse name: Not on file    Number of children: Not on file    Years of education: Not on file    Highest education level: Not on file   Occupational History    Not on file   Tobacco Use    Smoking status: Never    Smokeless tobacco: Never   Substance and Sexual Activity    Alcohol use: Never    Drug use: Never    Sexual activity: Not on file Other Topics Concern    Not on file   Social History Narrative    Not on file     Social Determinants of Health     Financial Resource Strain: Not on file   Food Insecurity: Not on file   Transportation Needs: Not on file   Physical Activity: Not on file   Stress: Not on file   Social Connections: Not on file   Intimate Partner Violence: Not on file   Housing Stability: Not on file      [] Unable to obtain due to ventilated and/ or neurologic status      Home Medications:      Medications Prior to Admission: Aspirin 81 MG CAPS, Take 81 mg by mouth daily  fenofibrate (TRICOR) 145 MG tablet, Take 145 mg by mouth  ferrous sulfate 324 (65 Fe) MG EC tablet, Take 324 mg by mouth  gabapentin (NEURONTIN) 600 MG tablet, Take 600 mg by mouth in the morning, at noon, and at bedtime. magnesium hydroxide (MILK OF MAGNESIA) 400 MG/5ML suspension, Take 30 mLs by mouth daily  Pantoprazole Sodium (PROTONIX PO), Take 40 mg by mouth daily  sertraline (ZOLOFT) 25 MG tablet, Take 75 mg by mouth daily  Vitamin D, Ergocalciferol, 93574 units CAPS, Take 1 tablet by mouth daily  acetaminophen (TYLENOL) 500 MG tablet, Take 2 tablets by mouth every 8 hours as needed  atorvastatin (LIPITOR) 40 MG tablet, Take 1 tablet by mouth daily  enoxaparin (LOVENOX) 40 MG/0.4ML, Inject 40 mg into the skin daily  ezetimibe (ZETIA) 10 MG tablet, Take 10 mg by mouth at bedtime  finasteride (PROSCAR) 5 MG tablet, Take 5 mg by mouth daily  guaiFENesin (MUCINEX) 600 MG extended release tablet, Take 1 tablet by mouth in the morning and at bedtime  HYDROcodone-acetaminophen (NORCO) 5-325 MG per tablet, Take 1 tablet by mouth every 6 hours as needed.   ipratropium-albuterol (DUONEB) 0.5-2.5 (3) MG/3ML SOLN nebulizer solution, Inhale 1 vial into the lungs 4 times daily  levoFLOXacin (LEVAQUIN) 750 MG tablet, Take 1 tablet by mouth daily  melatonin 3 MG TABS tablet, Take 1 tablet by mouth at bedtime  mirabegron (MYRBETRIQ) 50 MG TB24, Take 50 mg by mouth daily  predniSONE (DELTASONE) 20 MG tablet, Take 40 mg by mouth daily  tamsulosin (FLOMAX) 0.4 MG capsule, Take 0.4 mg by mouth at bedtime  methocarbamol (ROBAXIN) 750 MG tablet, Take 750 mg by mouth 3 times daily  furosemide (LASIX) 40 MG tablet, Take 40 mg by mouth daily  tadalafil (CIALIS) 5 MG tablet, Take 5 mg by mouth daily  [DISCONTINUED] Methocarbamol 1000 MG TABS, Take 750 mg by mouth 3 times daily    Current Hospital Medications:     Scheduled Meds:   [Held by provider] enoxaparin  1 mg/kg SubCUTAneous BID    furosemide  40 mg IntraVENous Once    aspirin  81 mg Oral Daily    atorvastatin  80 mg Oral Nightly    polyethylene glycol  17 g Oral Daily    ezetimibe  10 mg Oral Nightly    finasteride  5 mg Oral Daily    melatonin  3 mg Oral Nightly    methocarbamol  750 mg Oral TID    pantoprazole  40 mg Oral Daily    sertraline  75 mg Oral Daily    tamsulosin  0.4 mg Oral Nightly    cefUROXime  500 mg Oral 2 times per day    clonazePAM  0.25 mg Oral Q12H    sodium chloride  1,000 mL IntraVENous Once    sodium chloride flush  5-40 mL IntraVENous 2 times per day    dexamethasone  6 mg IntraVENous Daily    guaiFENesin  600 mg Oral BID    albuterol sulfate HFA  2 puff Inhalation 4x daily     Continuous Infusions:   lactated ringers IV soln      lactated ringers IV soln 100 mL/hr at 03/10/23 0713    sodium chloride       PRN Meds:.albuterol sulfate HFA, trimethobenzamide, sodium chloride flush, sodium chloride, polyethylene glycol, acetaminophen **OR** acetaminophen, benzonatate, guaiFENesin-dextromethorphan  .   lactated ringers IV soln      lactated ringers IV soln 100 mL/hr at 03/10/23 0713    sodium chloride          Allergies: Allergies   Allergen Reactions    Penicillins     Sulfamethoxazole Hives    Tapentadol Hives    Trimethoprim Hives        Review of Systems:       Review of Systems   Constitutional:  Positive for fatigue. Negative for diaphoresis. HENT: Negative. Eyes: Negative.     Respiratory: Positive for cough and shortness of breath. Negative for chest tightness, wheezing and stridor. Cardiovascular: Negative. Negative for chest pain, palpitations and leg swelling. Gastrointestinal: Negative. Negative for blood in stool and nausea. Genitourinary: Negative. Musculoskeletal: Negative. Skin: Negative. Neurological:  Positive for weakness. Negative for dizziness, syncope and light-headedness. Hematological: Negative. Psychiatric/Behavioral: Negative. Objective Findings:     Vitals:BP (!) 92/53   Pulse 84   Temp 97.7 °F (36.5 °C)   Resp 18   Ht 5' 11\" (1.803 m)   Wt 180 lb (81.6 kg)   SpO2 99%   BMI 25.10 kg/m²      Physical Examination:    Physical Exam   Constitutional: No distress. He appears chronically ill and acutely ill. HENT:   Normal cephalic and Atraumatic   Eyes: Pupils are equal, round, and reactive to light. Neck: Thyroid normal. No JVD present. No neck adenopathy. No thyromegaly present. Cardiovascular: Regular rhythm, intact distal pulses and normal pulses. Tachycardia present. Murmur heard. Pulmonary/Chest: Effort normal and breath sounds normal. He has no wheezes. He has no rales. He exhibits no tenderness. Abdominal: Soft. Bowel sounds are normal. There is no abdominal tenderness. Musculoskeletal:         General: Edema present. No tenderness. Normal range of motion. Cervical back: Normal range of motion and neck supple. Neurological: He is alert and oriented to person, place, and time. Skin: Skin is warm. No cyanosis. Nails show no clubbing.        Results/ Medications reviewed 3/10/2023, 8:56 AM     Laboratory, Microbiology, Pathology, Radiology, Cardiology, Medications and Transcriptions reviewed  Scheduled Meds:   [Held by provider] enoxaparin  1 mg/kg SubCUTAneous BID    furosemide  40 mg IntraVENous Once    aspirin  81 mg Oral Daily    atorvastatin  80 mg Oral Nightly    polyethylene glycol  17 g Oral Daily    ezetimibe  10 mg Oral Nightly    finasteride  5 mg Oral Daily    melatonin  3 mg Oral Nightly    methocarbamol  750 mg Oral TID    pantoprazole  40 mg Oral Daily    sertraline  75 mg Oral Daily    tamsulosin  0.4 mg Oral Nightly    cefUROXime  500 mg Oral 2 times per day    clonazePAM  0.25 mg Oral Q12H    sodium chloride  1,000 mL IntraVENous Once    sodium chloride flush  5-40 mL IntraVENous 2 times per day    dexamethasone  6 mg IntraVENous Daily    guaiFENesin  600 mg Oral BID    albuterol sulfate HFA  2 puff Inhalation 4x daily     Continuous Infusions:   lactated ringers IV soln      lactated ringers IV soln 100 mL/hr at 03/10/23 0713    sodium chloride         Recent Labs     03/08/23  1600 03/08/23  1615 03/09/23  0611 03/10/23  0540   WBC 11.6*  --  8.1 11.2*   HGB 9.1* 11.2* 9.3* 9.1*   HCT 29.0*  --  28.8* 28.7*   MCV 91.7  --  88.1 88.7     --  305 278       Recent Labs     03/08/23  1747 03/09/23  0611 03/10/23  0540    142 141   K 3.9 3.6 3.4    101 102   CO2 28 32* 33*   BUN 49* 48* 46*   CREATININE 1.18 1.17 1.05       Recent Labs     03/08/23  1747   AST 49*   ALT 18   BILITOT 0.7   ALKPHOS 37       No results for input(s): LIPASE, AMYLASE in the last 72 hours. Recent Labs     03/08/23 1747 03/09/23  1419   PROT 6.8  --    INR  --  1.5       CTA CHEST W WO CONTRAST PE Eval    Result Date: 3/8/2023  EXAMINATION: CTA OF THE CHEST WITH AND WITHOUT CONTRAST 3/8/2023 5:33 pm TECHNIQUE: CTA of the chest was performed before and after the administration of intravenous contrast.  Multiplanar reformatted images are provided for review. MIP images are provided for review. Automated exposure control, iterative reconstruction, and/or weight based adjustment of the mA/kV was utilized to reduce the radiation dose to as low as reasonably achievable. COMPARISON: None.  HISTORY: ORDERING SYSTEM PROVIDED HISTORY: PE TECHNOLOGIST PROVIDED HISTORY: Reason for exam:->PE Decision Support Exception - unselect if not a suspected or confirmed emergency medical condition->Emergency Medical Condition (MA) What reading provider will be dictating this exam?->CRC FINDINGS: Pulmonary Arteries: Pulmonary arteries are adequately opacified for evaluation. No evidence of intraluminal filling defect to suggest pulmonary embolism. Main pulmonary artery is mildly prominent. Pulmonary arterial hypertension cannot be excluded. . Mediastinum: No evidence of mediastinal lymphadenopathy. The heart and pericardium demonstrate no acute abnormality. There is no acute abnormality of the thoracic aorta. There is mild aneurysmal dilatation of the ascending aorta which measures 4 cm. Lungs/pleura: 5 mm right upper lobe pulmonary nodule is noted (axial image 22). Atelectasis versus scarring is noted in the right lower lobe. There is right pleural calcification and pleural thickening which may be due to asbestos related pleural disease. There is volume loss in the left lung with mediastinal shift to the left. Consolidated left lung may reflect atelectasis or chronic scarring. There is pleural calcification and thickening in the left hemithorax which may rib reflect asbestos related pleural disease or chronic changes from remote hematoma or granulomatous disease such as tuberculosis. Upper Abdomen: Low-density lesions in the right kidney are likely cysts. 9 mm calcification in the left kidney and 1 cm calcification in the right kidney are consistent with renal calculi. Small calcified gallstones are noted. Soft Tissues/Bones: Post sternotomy changes are noted. Chronic T12 compression deformity is noted. There is fusion hardware in the visualized upper lumbar spine. 1. Negative study for pulmonary embolism. 2. Chronic scarring versus atelectasis in the left lung. 3. Bilateral pleural calcifications which may be due to asbestos related pleural disease, chronic hematomas or granulomatous disease.  4. Prominence of the main pulmonary arteries which may indicate pulmonary arterial hypertension. 5. Bilateral nephrolithiasis. 6. Cholelithiasis. 7. 5 mm right upper lobe pulmonary nodule. RECOMMENDATIONS: Fleischner Society guidelines for follow-up and management of incidentally detected pulmonary nodules: Single Solid Nodule: Nodule size less than 6 mm In a low-risk patient, no routine follow-up. In a high-risk patient, optional CT at 12 months. - Low risk patients include individuals with minimal or absent history of smoking and other known risk factors. - High risk patients include individuals with a history or smoking or known risk factors. Radiology 2017 http://pubs. rsna.org/doi/full/10.1148/radiol. 5774330575     XR CHEST PORTABLE    Result Date: 3/8/2023  EXAMINATION: ONE XRAY VIEW OF THE CHEST 3/8/2023 4:47 pm COMPARISON: None. HISTORY: ORDERING SYSTEM PROVIDED HISTORY: sob TECHNOLOGIST PROVIDED HISTORY: Reason for exam:->sob What reading provider will be dictating this exam?->CRC FINDINGS: The lungs are without acute focal process. Small right pleural effusion. Probable left pleural effusion. No pneumothorax. Cardiomegaly. Left-sided transvenous pacer device, prosthetic heart valve and sternotomy wires noted. The osseous structures are without acute process. Right pleural effusion. Probable left pleural effusion. Cardiomegaly. Active Hospital Problems    Diagnosis Date Noted    Hypoxia [R09.02] 03/09/2023     Priority: Medium    Acute respiratory failure with hypoxia (HCC) [J96.01] 03/08/2023     Priority: Medium         Impression/Plan:   COVID PNA - significant cough and thick yellow sputum - iv Decadron. Supportive care. IS. Chest Vest. For Bronch and BAL today  PAF - advance Lovenox to full dose. Change to Eliquis when stable  BIO MVR and AVR post IE   CABG- add ASA and statin. BB when BP mores stable. HPL Statin low fat diet  PPM CHB      Thank you for allowing us to participate in the care of this patient.      Will continue to follow. Please call if questions or concerns arise.     Electronically signed by Julia Van MD on 3/10/2023 at 8:56 AM

## 2023-03-10 NOTE — OP NOTE
Operative Note      Patient: Mireya Quigley  YOB: 1945  MRN: [id number]    Date of Procedure: 3/10/2023    Pre-Op Diagnosis: COVID POSITIVE,  COLLAPSE LEFT LUNG    Post-Op Diagnosis: Same       Procedure(s):  BRONCHOSCOPY AIRWAY CLEARANCE BRONCHOALVEOLAR    Surgeon(s):  Avril Jackson MD    Assistant:   * No surgical staff found *    Anesthesia: Choice    Estimated Blood Loss (mL): Minimal    Complications: None    Specimens:   ID Type Source Tests Collected by Time Destination   1 : BRONCHOALVEOLAR LAVAGE (BAL) Body Fluid Lung CELL COUNT WITH DIFFERENTIAL, BODY FLUID, CULTURE, FUNGUS, GRAM STAIN, CULTURE WITH SMEAR, ACID FAST BACILLIUS, CULTURE, RESPIRATORY Avril Jackson MD 3/10/2023 2888    2 : WASHING Body Fluid Lung CELL COUNT WITH DIFFERENTIAL, BODY FLUID, CULTURE, FUNGUS, GRAM STAIN, CULTURE, ACID FAST BACILLIUS, CULTURE, RESPIRATORY Avril Jackson MD 3/10/2023 2223    A : 50/50 Body Fluid Lung CYTOLOGY, NON-GYN Avril Jackson MD 3/10/2023 9498        Implants:  * No implants in log *      Drains:   [REMOVED] Urinary Catheter 02/22/23 [place] (Removed)       Findings: Thick white secretions noted throughout the tracheobronchial tree, completely obstructing left main bronchus. This was suctioned and cleaned. No endobronchial lesion, left lower lobe and left upper lobe mucosa was inflamed and bleeds easily with suction trauma    Detailed Description of Procedure:   PROCEDURE:  Fiberoptic bronchoscopy with     Bronchoscopy, Diagnostic  Bronchoalveolar lavage, BAL, left upper lobe and left lower lobe  Airway clearance and removal of secretions      CONSENT:  The risks and benefits of this procedure were explained to the the patient . All questions were answered and alternative options explained. The patient has been assessed and He is stable to undergo conscious sedation as well as the procedure itself.       MEDICATIONS USED:  general anesthesia   Topical lidocaine 1% without epinephrine, total quantity 6 mL. DESCRIPTION OF PROCEDURE:        fiberoptic Olympus bronchoscope was inserted via ET tube to the tracheobronchial tree, and visual airway inspection of the right upper lobe, right middle lobe, right lower lobe, left upper lobe, lingula, and left lower lobe was performed. There were no endobronchial lesions,  . Anatomy was normal to the segmental level. Patient has thick white secretions throughout the tracheobronchial tree worse on the left with complete obstruction of the left main bronchus left lower lobe, and left upper lobe. Mucosa on the left was inflamed and easily bleeds on suction trauma. Secretions  washed and cleaned from entire tracheobronchial tree and sent for culture     Bronchoalveolar lavage of the  left upper lobe and left lower lobe segment was then obtained.            ESTIMATED BLOOD LOSS:  less than 5 cc    COMPLICATIONS:  None    Electronically signed by Tracie Galloway MD [facility],  on 3/10/2023 at 9:30 AM

## 2023-03-10 NOTE — PROGRESS NOTES
Shift assessment complete. VSS. A&Ox4. Pt forgetful at times. Lung sounds are diminished. Moist productive cough present. Bloody sputum present (Pt had bronchoscopy today)(Hold lovenox until NO bloody sputum per Dr Valencia Torres.) On 4L NC. Dyspnea present on exertion. Paced on tele. Abdomen is soft and round. Bowel sounds are active. Pt had One Large BM today. Pt cleaned and repositioned, Air Cushion placed under buttocks. Zinc cream applied. BLE edema present. Heels elevated off of bed. AVASYS for safety. Falls precautions in plac. Call light in reach. No needs at this time. - 0630 - Pt taken down for bronchoscopy. Returned to 4 W 1100. VSS. Pills/Meds given per MAR. Suction at bedside.  - Dr Yvan Soto aware of low BP. 94/59. No orders placed at this time.    - PRN tessalon Given for cough  Electronically signed by Jonelle Hensley RN on 3/10/2023 at 6:30 PM

## 2023-03-10 NOTE — DISCHARGE INSTR - COC
Continuity of Care Form    Patient Name: Erica Henry   :    MRN:  [id number]    Admit date:  3/8/2023  Discharge date:  ***    Code Status Order: Full Code   Advance Directives:   Advance Care Flowsheet Documentation       Date/Time Healthcare Directive Type of Healthcare Directive Copy in [address] Agent's Name Healthcare Agent's Phone Number    03/10/23 8788 No, patient does not have an advance directive for healthcare treatment -- -- -- -- --            Admitting Physician:  Sánchez James DO  PCP: Jeferson Grover MD    Discharging Nurse: [facility] Unit/Room#: E525/J256-87  Discharging Unit Phone Number: ***    Emergency Contact:   Extended Emergency Contact Information  Primary Emergency Contact: [address] [place] [address] Phone: [phone]  Relation: Spouse   needed?  No    Past Surgical History:  Past Surgical History:   Procedure Laterality Date    PACEMAKER PLACEMENT N/A        Immunization History:   Immunization History   Administered Date(s) Administered    COVID-19, PFIZER GRAY top, DO NOT Dilute, (age 15 y+), IM, 30 mcg/0.3 mL 2022    COVID-19, PFIZER PURPLE top, DILUTE for use, (age 15 y+), 30mcg/0.3mL 2021, 2021, 2021       Active Problems:  Patient Active Problem List   Diagnosis Code    Acute respiratory failure with hypoxia ([facility] [address] J96.01    Hypoxia R09.02       Isolation/Infection:   Isolation            Droplet Plus          Patient Infection Status       Infection Onset Added Last Indicated Last Indicated By Review Planned Expiration Resolved Resolved By    COVID-19 23 COVID-19 23      Covid 19 positive 3/2/2023 per ODRS    Resolved    Pulmonary Tuberculosis (Rule Out) 03/10/23 03/10/23 03/10/23 Culture with Smear, Acid Fast Bacillius (Ordered)   03/10/23 Nolberto Schlatter, RN Dr. Laraine Prose says that pt does not need to be a TB rule out/ 3.10.2023            Nurse Assessment:  Last Vital Signs: /74   Pulse 88   Temp 98.2 °F (36.8 °C)   Resp 16   Ht 5' 11\" (1.803 m)   Wt 180 lb (81.6 kg)   SpO2 94%   BMI 25.10 kg/m²     Last documented pain score (0-10 scale): Pain Level: 0  Last Weight:   Wt Readings from Last 1 Encounters:   23 180 lb (81.6 kg)     Mental Status:  {IP PT MENTAL STATUS:98894}    IV Access:  { SHINE IV ACCESS:957337961}    Nursing Mobility/ADLs:  Walking   {CHP DME TQNO:799261100}  Transfer  {CHP DME RZNC:905052626}  Bathing  {CHP DME YPCV:708108000}  Dressing  {CHP DME RPGB:569801092}  Toileting  {CHP DME QYBQ:316159947}  Feeding  {CHP DME DAYU:894945380}  Med Admin  {P DME QAMA:809241367}  Med Delivery   { SHINE MED Delivery:224839209}    Wound Care Documentation and Therapy:        Elimination:  Continence: Bowel: {YES / H}  Bladder: {YES / RN:35620}  Urinary Catheter: {Urinary Catheter:547631531}   Colostomy/Ileostomy/Ileal Conduit: {YES / HR:16708}       Date of Last BM: ***    Intake/Output Summary (Last 24 hours) at 3/10/2023 1434  Last data filed at 3/10/2023 1256  Gross per 24 hour   Intake 1176.67 ml   Output 2050 ml   Net -873.33 ml     I/O last 3 completed shifts:   In: 700 [P.O.:700]  Out: 4475 [Urine:4475]    Safety Concerns:     [address] Safety Concerns:766183507}    Impairments/Disabilities:      [address] Impairments/Disabilities:928906256}    Nutrition Therapy:  Current Nutrition Therapy:   [address] Diet List:708454710}    Routes of Feeding: {CHP DME Other Feedings:579730851}  Liquids: {Slp liquid thickness:47370}  Daily Fluid Restriction: {CHP DME Yes amt example:287843418}  Last Modified Barium Swallow with Video (Video Swallowing Test): {Done Not Done VWMR:381964884}    Treatments at the Time of Hospital Discharge:   Respiratory Treatments: ***  Oxygen Therapy:  {Therapy; copd oxygen:82373}  Ventilator:    {MH CC Vent CXEY:820133550}    Rehab Therapies: {THERAPEUTIC INTERVENTION:3235682190}  Weight Bearing Status/Restrictions: [address] CC Weight Bearin}  Other Medical Equipment (for information only, NOT a DME order):  {EQUIPMENT:013000023}  Other Treatments: ***    Patient's personal belongings (please select all that are sent with patient):  {CHP DME Belongings:126475618}    RN SIGNATURE:  {Esignature:537359764}    CASE MANAGEMENT/SOCIAL WORK SECTION    Inpatient Status Date: ***    Readmission Risk Assessment Score:  Readmission Risk              Risk of Unplanned Readmission:  19           Discharging to Facility/ Agency   Name: [facility]i [facility]  Address:  Phone: [phone]  Fax:    Dialysis Facility (if applicable)   Name:  Address:  Dialysis Schedule:  Phone:  Fax:    / signature: Electronically signed by RACHELL Hidalgo LSW on 3/10/23 at 2:39 PM EST    PHYSICIAN SECTION    Prognosis: {Prognosis:5968878248}    Condition at Discharge: [address] Patient Condition:203251686}    Rehab Potential (if transferring to Rehab): {Prognosis:1514092576}    Recommended Labs or Other Treatments After Discharge: ***    Physician Certification: I certify the above information and transfer of Emilie Hu  is necessary for the continuing treatment of the diagnosis listed and that he requires {Admit to Appropriate Level of Care:53885} for {GREATER/LESS:932035546} 30 days.      Update Admission H&P: {CHP DME Changes in QESCL:361473629}    PHYSICIAN SIGNATURE:  Electronically signed by Keny Ni DO on 3/10/23 at 2:34 PM EST

## 2023-03-10 NOTE — PROGRESS NOTES
Patient is complaining of anxiety and panic attacks. He is refusing medications and blood work and stated he was afraid he would choke. Offered to crush medication and put in applesauce, patient declined. SpO2 99% on 3L.  Electronically signed by Raj Diallo RN on 3/10/2023 at 5:01 AM

## 2023-03-10 NOTE — ANESTHESIA POSTPROCEDURE EVALUATION
Department of Anesthesiology  Postprocedure Note    Patient: Susi Botello  MRN: [id number]  YOB: 1945  Date of evaluation: 3/10/2023      Procedure Summary     Date: 03/10/23 Room / Location: [address]    Anesthesia Start: 9195 Anesthesia Stop: 6325    Procedure: BRONCHOSCOPY AIRWAY CLEARANCE BRONCHOALVEOLAR (Mouth) Diagnosis:       COVID-19 virus RNA test result positive at limit of detection      Collapse of left lung      (COVID POSITIVE,  COLLAPSE LEFT LUNG)    Surgeons: Wero Chu MD Responsible Provider: Lillie Espinoza MD    Anesthesia Type: general ASA Status: 4          Anesthesia Type: No value filed.     Darby Phase I:      Darby Phase II:        Anesthesia Post Evaluation    Patient location during evaluation: bedside  Patient participation: complete - patient participated  Level of consciousness: awake and awake and alert  Airway patency: patent  Nausea & Vomiting: no nausea and no vomiting  Complications: no  Cardiovascular status: blood pressure returned to baseline and hemodynamically stable  Respiratory status: acceptable  Hydration status: euvolemic

## 2023-03-11 ENCOUNTER — APPOINTMENT (OUTPATIENT)
Dept: GENERAL RADIOLOGY | Age: 78
DRG: 177 | End: 2023-03-11
Payer: MEDICARE

## 2023-03-11 LAB
ANION GAP SERPL CALCULATED.3IONS-SCNC: 6 MEQ/L (ref 9–15)
BASOPHILS ABSOLUTE: 0 K/UL (ref 0–0.2)
BASOPHILS RELATIVE PERCENT: 0 %
BUN BLDV-MCNC: 35 MG/DL (ref 8–23)
CALCIUM SERPL-MCNC: 8.8 MG/DL (ref 8.5–9.9)
CHLORIDE BLD-SCNC: 102 MEQ/L (ref 95–107)
CO2: 34 MEQ/L (ref 20–31)
CREAT SERPL-MCNC: 0.91 MG/DL (ref 0.7–1.2)
EOSINOPHILS ABSOLUTE: 0 K/UL (ref 0–0.7)
EOSINOPHILS RELATIVE PERCENT: 0.2 %
GFR SERPL CREATININE-BSD FRML MDRD: >60 ML/MIN/{1.73_M2}
GLUCOSE BLD-MCNC: 110 MG/DL (ref 70–99)
HCT VFR BLD CALC: 25.9 % (ref 42–52)
HEMOGLOBIN: 8.5 G/DL (ref 14–18)
LYMPHOCYTES ABSOLUTE: 0.7 K/UL (ref 1–4.8)
LYMPHOCYTES RELATIVE PERCENT: 6.6 %
MAGNESIUM: 2 MG/DL (ref 1.7–2.4)
MCH RBC QN AUTO: 29.2 PG (ref 27–31.3)
MCHC RBC AUTO-ENTMCNC: 32.8 % (ref 33–37)
MCV RBC AUTO: 89.2 FL (ref 79–92.2)
MONOCYTES ABSOLUTE: 0.8 K/UL (ref 0.2–0.8)
MONOCYTES RELATIVE PERCENT: 7.3 %
NEUTROPHILS ABSOLUTE: 9 K/UL (ref 1.4–6.5)
NEUTROPHILS RELATIVE PERCENT: 85.9 %
PDW BLD-RTO: 21 % (ref 11.5–14.5)
PLATELET # BLD: 208 K/UL (ref 130–400)
POTASSIUM REFLEX MAGNESIUM: 3.5 MEQ/L (ref 3.4–4.9)
RBC # BLD: 2.9 M/UL (ref 4.7–6.1)
SODIUM BLD-SCNC: 142 MEQ/L (ref 135–144)
WBC # BLD: 10.5 K/UL (ref 4.8–10.8)

## 2023-03-11 PROCEDURE — 2580000003 HC RX 258: Performed by: INTERNAL MEDICINE

## 2023-03-11 PROCEDURE — 1210000000 HC MED SURG R&B

## 2023-03-11 PROCEDURE — 94640 AIRWAY INHALATION TREATMENT: CPT

## 2023-03-11 PROCEDURE — 6360000002 HC RX W HCPCS: Performed by: INTERNAL MEDICINE

## 2023-03-11 PROCEDURE — 36415 COLL VENOUS BLD VENIPUNCTURE: CPT

## 2023-03-11 PROCEDURE — 94761 N-INVAS EAR/PLS OXIMETRY MLT: CPT

## 2023-03-11 PROCEDURE — 83735 ASSAY OF MAGNESIUM: CPT

## 2023-03-11 PROCEDURE — 71045 X-RAY EXAM CHEST 1 VIEW: CPT

## 2023-03-11 PROCEDURE — 85025 COMPLETE CBC W/AUTO DIFF WBC: CPT

## 2023-03-11 PROCEDURE — 80048 BASIC METABOLIC PNL TOTAL CA: CPT

## 2023-03-11 PROCEDURE — 6370000000 HC RX 637 (ALT 250 FOR IP): Performed by: INTERNAL MEDICINE

## 2023-03-11 PROCEDURE — 2700000000 HC OXYGEN THERAPY PER DAY

## 2023-03-11 PROCEDURE — 99232 SBSQ HOSP IP/OBS MODERATE 35: CPT | Performed by: INTERNAL MEDICINE

## 2023-03-11 PROCEDURE — 94668 MNPJ CHEST WALL SBSQ: CPT

## 2023-03-11 RX ADMIN — SERTRALINE HYDROCHLORIDE 75 MG: 50 TABLET ORAL at 09:45

## 2023-03-11 RX ADMIN — Medication 5 ML: at 09:00

## 2023-03-11 RX ADMIN — POTASSIUM BICARBONATE 40 MEQ: 782 TABLET, EFFERVESCENT ORAL at 09:46

## 2023-03-11 RX ADMIN — CEFTRIAXONE SODIUM 1000 MG: 1 INJECTION, POWDER, FOR SOLUTION INTRAMUSCULAR; INTRAVENOUS at 17:25

## 2023-03-11 RX ADMIN — ATORVASTATIN CALCIUM 80 MG: 80 TABLET, FILM COATED ORAL at 21:07

## 2023-03-11 RX ADMIN — METHOCARBAMOL 750 MG: 500 TABLET ORAL at 09:45

## 2023-03-11 RX ADMIN — CEFUROXIME AXETIL 500 MG: 500 TABLET, FILM COATED ORAL at 09:45

## 2023-03-11 RX ADMIN — METHOCARBAMOL 750 MG: 500 TABLET ORAL at 14:05

## 2023-03-11 RX ADMIN — ALBUTEROL SULFATE 2 PUFF: 90 AEROSOL, METERED RESPIRATORY (INHALATION) at 08:25

## 2023-03-11 RX ADMIN — Medication 10 ML: at 21:08

## 2023-03-11 RX ADMIN — FINASTERIDE 5 MG: 5 TABLET, FILM COATED ORAL at 09:46

## 2023-03-11 RX ADMIN — DEXAMETHASONE SODIUM PHOSPHATE 6 MG: 10 INJECTION INTRAMUSCULAR; INTRAVENOUS at 09:44

## 2023-03-11 RX ADMIN — EZETIMIBE 10 MG: 10 TABLET ORAL at 21:07

## 2023-03-11 RX ADMIN — BENZONATATE 100 MG: 100 CAPSULE ORAL at 09:44

## 2023-03-11 RX ADMIN — ALBUTEROL SULFATE 2 PUFF: 90 AEROSOL, METERED RESPIRATORY (INHALATION) at 15:50

## 2023-03-11 RX ADMIN — ALBUTEROL SULFATE 2 PUFF: 90 AEROSOL, METERED RESPIRATORY (INHALATION) at 20:52

## 2023-03-11 RX ADMIN — PANTOPRAZOLE SODIUM 40 MG: 40 TABLET, DELAYED RELEASE ORAL at 09:45

## 2023-03-11 RX ADMIN — METHOCARBAMOL 750 MG: 500 TABLET ORAL at 21:07

## 2023-03-11 RX ADMIN — TAMSULOSIN HYDROCHLORIDE 0.4 MG: 0.4 CAPSULE ORAL at 21:07

## 2023-03-11 RX ADMIN — CLONAZEPAM 0.25 MG: 0.5 TABLET ORAL at 14:06

## 2023-03-11 RX ADMIN — Medication 3 MG: at 21:08

## 2023-03-11 RX ADMIN — ASPIRIN 81 MG: 81 TABLET, COATED ORAL at 09:44

## 2023-03-11 RX ADMIN — GUAIFENESIN 600 MG: 600 TABLET, EXTENDED RELEASE ORAL at 09:45

## 2023-03-11 RX ADMIN — GUAIFENESIN 600 MG: 600 TABLET, EXTENDED RELEASE ORAL at 21:07

## 2023-03-11 RX ADMIN — CLONAZEPAM 0.25 MG: 0.5 TABLET ORAL at 01:41

## 2023-03-11 RX ADMIN — ALBUTEROL SULFATE 2 PUFF: 90 AEROSOL, METERED RESPIRATORY (INHALATION) at 11:45

## 2023-03-11 ASSESSMENT — PAIN SCALES - GENERAL
PAINLEVEL_OUTOF10: 0
PAINLEVEL_OUTOF10: 0

## 2023-03-11 NOTE — FLOWSHEET NOTE
Assessment completed. VSS. Pt is alert et oriented x4. No cough noted at this time. Denies chest pain,sob or nausea at this time. Call light within reach. Electronically signed by Yandel Walton RN on 3/10/2023 at 10:42 PM

## 2023-03-11 NOTE — PROGRESS NOTES
Shift assessment complete. Meds as ordered. Free from falls/injuries. No new skin impairments noted/reported. Avasys remains at bedside. Droplet Plus precautions maintained. Pt coughing this AM, prn given as per request. Bed in lowest position. Call light within reach. Will continue to monitor.

## 2023-03-11 NOTE — PROGRESS NOTES
Physician Progress Note    3/11/2023   4:20 PM    Name:  Marcia Vera  MRN:    [id number]     IP Day: 3     Admit Date: 3/8/2023  3:27 PM  PCP: Jose Taylor MD    Code Status:  Full Code    Subjective:     He feels tired. He is having some hemoptysis.     Current Facility-Administered Medications   Medication Dose Route Frequency Provider Last Rate Last Admin    cefTRIAXone (ROCEPHIN) 1,000 mg in sodium chloride 0.9 % 50 mL IVPB (mini-bag)  1,000 mg IntraVENous Q24H Vinayak Cortez DO        albuterol sulfate HFA (PROVENTIL;VENTOLIN;PROAIR) 108 (90 Base) MCG/ACT inhaler 2 puff  2 puff Inhalation Q2H PRN Harriet Bars, DO        [Held by provider] enoxaparin (LOVENOX) injection 80 mg  1 mg/kg SubCUTAneous BID Jenna Acosta MD        furosemide (LASIX) injection 40 mg  40 mg IntraVENous Once Jenna Acosta MD        aspirin EC tablet 81 mg  81 mg Oral Daily Jenna Acosta MD   81 mg at 03/11/23 0944    atorvastatin (LIPITOR) tablet 80 mg  80 mg Oral Nightly Jenna Acosta MD   80 mg at 03/10/23 1944    ezetimibe (ZETIA) tablet 10 mg  10 mg Oral Nightly Harriet Bars, DO   10 mg at 03/10/23 1945    finasteride (PROSCAR) tablet 5 mg  5 mg Oral Daily Harriet Bars, DO   5 mg at 03/11/23 0946    melatonin tablet 3 mg  3 mg Oral Nightly Harriet Bars, DO   3 mg at 03/10/23 1944    methocarbamol (ROBAXIN) tablet 750 mg  750 mg Oral TID Harriet Bars, DO   750 mg at 03/11/23 1405    pantoprazole (PROTONIX) tablet 40 mg  40 mg Oral Daily Harriet Bars, DO   40 mg at 03/11/23 0945    sertraline (ZOLOFT) tablet 75 mg  75 mg Oral Daily Harriet Bars, DO   75 mg at 03/11/23 0945    tamsulosin (FLOMAX) capsule 0.4 mg  0.4 mg Oral Nightly Harriet Bars, DO   0.4 mg at 03/10/23 1944    trimethobenzamide (TIGAN) injection 200 mg  200 mg IntraMUSCular Q6H PRN Harriet Bars, DO        clonazePAM (KLONOPIN) tablet 0.25 mg  0.25 mg Oral Q12H Caleb Mcmahan MD   0.25 mg at 03/11/23 1406    0.9 % sodium chloride bolus 1,000 mL IntraVENous Once Ira [place], DO   Held at 23 6024    sodium chloride flush 0.9 % injection 5-40 mL  5-40 mL IntraVENous 2 times per day Ira [place], DO   5 mL at 23 0900    sodium chloride flush 0.9 % injection 5-40 mL  5-40 mL IntraVENous PRN Ira [place], DO        0.9 % sodium chloride infusion   IntraVENous PRN Ira [place], DO        polyethylene glycol (GLYCOLAX) packet 17 g  17 g Oral Daily PRN Ira [place], DO        acetaminophen (TYLENOL) tablet 650 mg  650 mg Oral Q6H PRN Ira [place], DO        Or    acetaminophen (TYLENOL) suppository 650 mg  650 mg Rectal Q6H PRN Ira [place], DO        guaiFENesin [facility]) extended release tablet 600 mg  600 mg Oral BID Ira [place], DO   600 mg at 23 0945    benzonatate (TESSALON) capsule 100 mg  100 mg Oral TID PRN Ira [place], DO   100 mg at 23 0944    guaiFENesin-dextromethorphan (ROBITUSSIN DM) 100-10 MG/5ML syrup 5 mL  5 mL Oral Q4H PRN Ira [place], DO        albuterol sulfate HFA (PROVENTIL;VENTOLIN;PROAIR) 108 (90 Base) MCG/ACT inhaler 2 puff  2 puff Inhalation 4x daily Ira [place], DO   2 puff at 23 1550       Physical Examination:      Vitals:  BP (!) 97/58   Pulse 78   Temp 97.9 °F (36.6 °C)   Resp 16   Ht 5' 11\" (1.803 m)   Wt 180 lb (81.6 kg)   SpO2 93%   BMI 25.10 kg/m²   Temp (24hrs), Av °F (36.7 °C), Min:97.9 °F (36.6 °C), Max:98.2 °F (36.8 °C)      General appearance: alert, cooperative and no distress. Elderly and chronically ill-appearing but able to have normal conversation. Oriented to person, hospital, and month. Lungs: Diminished on the left, normal effort  Heart: regular rate and rhythm, no murmur  Abdomen: soft, nontender, nondistended, bowel sounds present, no masses  Extremities: Trace pitting edema. No redness, tenderness in the calves.  Cap refill <2s  Skin: no gross lesions, rashes    Data:     Labs:  Recent Labs     03/10/23  0540 23  0440 WBC 11.2* 10.5   HGB 9.1* 8.5*    208     Recent Labs     03/10/23  0540 03/11/23  0447    142   K 3.4 3.5    102   CO2 33* 34*   BUN 46* 35*   CREATININE 1.05 0.91   GLUCOSE 111* 110*     Recent Labs     03/08/23  1747   AST 49*   ALT 18   BILITOT 0.7   ALKPHOS 37       Assessment and Plan:        1. Acute respiratory failure with hypoxia suspect primarily due to atelectasis related to mucous plugging, with superimposed Staph aureus pneumonia. S/p bronchoscopy on 3/10 with thick white secretions noted throughout tracheobronchial tree worse on the left with complete obstruction of left mainstem bronchus. Mucosa easily bleeds on suction trauma. Secretions were washed and cleaned  -Stop steroid  -Continue Rocephin for now per pulmonary recommendation. Noted negative procalcitonin. No leukocytosis. 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      37 minutes in total care time    Electronically signed by Gokul Campos DO on 3/11/2023 at 4:20 PM

## 2023-03-11 NOTE — PROGRESS NOTES
Pulmonary Progress Note    3/11/2023 12:30 PM    Subjective:   Admit Date: 3/8/2023  PCP: Dieter Esquivel MD    Chief Complaint   Patient presents with    Shortness of Breath     Pt was diagnosed with COVID 1 week ago and has had increased SOB and work of breathing. Pt was a wet cough noted. Pt on 4 L NC. Pt given 1 du neb and 125 mg solumedrol PTA. Pt is a/ox4. Interval History: Status post bronchoscopy yesterday. Chest x-ray showed improvement in aeration of left lung. Continues to have cough and sputum production. 12 points review of systems has been obtained and negative except to was mentioned in HPI. Medications:   Scheduled Meds:   [Held by provider] enoxaparin  1 mg/kg SubCUTAneous BID    furosemide  40 mg IntraVENous Once    aspirin  81 mg Oral Daily    atorvastatin  80 mg Oral Nightly    ezetimibe  10 mg Oral Nightly    finasteride  5 mg Oral Daily    melatonin  3 mg Oral Nightly    methocarbamol  750 mg Oral TID    pantoprazole  40 mg Oral Daily    sertraline  75 mg Oral Daily    tamsulosin  0.4 mg Oral Nightly    cefUROXime  500 mg Oral 2 times per day    clonazePAM  0.25 mg Oral Q12H    sodium chloride  1,000 mL IntraVENous Once    sodium chloride flush  5-40 mL IntraVENous 2 times per day    dexamethasone  6 mg IntraVENous Daily    guaiFENesin  600 mg Oral BID    albuterol sulfate HFA  2 puff Inhalation 4x daily     Continuous Infusions:   sodium chloride           Objective:   Vitals:   Temp (24hrs), Av °F (36.7 °C), Min:97.9 °F (36.6 °C), Max:98.2 °F (36.8 °C)    BP (!) 97/58   Pulse 84   Temp 97.9 °F (36.6 °C)   Resp 16   Ht 5' 11\" (1.803 m)   Wt 180 lb (81.6 kg)   SpO2 98%   BMI 25.10 kg/m²   I/O:24HR INTAKE/OUTPUT:    Intake/Output Summary (Last 24 hours) at 3/11/2023 1230  Last data filed at 3/11/2023 0634  Gross per 24 hour   Intake 900 ml   Output 1250 ml   Net -350 ml     03/10 0701 -  0700  In: 1151.7 [P.O.:900;  I.V.:251.7]  Out: 1250 [Urine:1250]  CVP: FiO2 : 50 %     Physical Exam:  General appearance - alert, ill appearing, and in mild distress  Mental status - alert, oriented to person, place, and time  Eyes - pupils equal and reactive, extraocular eye movements intact  Nose - normal and patent, no erythema, discharge or polyps  Neck - supple, no significant adenopathy  Chest -diminished bilaterally to auscultation, no wheezes, bilateral rhonchi, symmetric air entry  Heart - normal rate, regular rhythm, normal S1, S2, no murmurs, rubs, clicks or gallops  Abdomen - soft, nontender, nondistended, no masses or organomegaly  Rectal - deferred, not clinically indicated  Neurological - alert, oriented, normal speech, no focal findings or movement disorder noted, motor and sensory grossly normal bilaterally  Musculoskeletal - no joint tenderness, deformity or swelling  Extremities - peripheral pulses normal, no pedal edema, no clubbing or cyanosis  Skin - normal coloration and turgor, no rashes, no suspicious skin lesions noted              BMP:    Recent Labs     03/09/23  0611 03/10/23  0540 03/11/23 0447    141 142   K 3.6 3.4 3.5    102 102   CO2 32* 33* 34*   BUN 48* 46* 35*   CREATININE 1.17 1.05 0.91   GLUCOSE 103* 111* 110*    . MG:3,PHOS:3)@  Ionized Calcium: No results found for: IONCA  CBC:   Recent Labs     03/10/23  0540 03/11/23 0447   WBC 11.2* 10.5   HGB 9.1* 8.5*    208      ABG: No results for input(s): PH, PCO2, PO2 in the last 72 hours. Assessment and Plan:     Impression:    -Acute on chronic hypoxic respiratory failure. Currently on 4 L of oxygen.  -Left lung collapse. Status post bronchoscopy with aspiration of tracheobronchial tree. Overall improvement in atelectasis. -COVID-19 infection. -5 mm nodule. -Pleural plaques likely due to asbestos exposure. -Bilateral effusion. Recommendations:    -Continue oxygen. Wean for saturation above 90%.   -Continue droplet plus isolation.  -Continue antibiotics. -Await sputum culture. -Aspiration precautions.  -Strict intake and output measurement. -Repeat chest x-ray tomorrow.        Electronically signed by Giselle Rodriguez MD on 3/11/2023 at 12:30 PM

## 2023-03-12 ENCOUNTER — APPOINTMENT (OUTPATIENT)
Dept: GENERAL RADIOLOGY | Age: 78
DRG: 177 | End: 2023-03-12
Payer: MEDICARE

## 2023-03-12 LAB
AFB STAIN: NORMAL
ANION GAP SERPL CALCULATED.3IONS-SCNC: 6 MEQ/L (ref 9–15)
BASOPHILS ABSOLUTE: 0 K/UL (ref 0–0.2)
BASOPHILS RELATIVE PERCENT: 0.2 %
BUN BLDV-MCNC: 30 MG/DL (ref 8–23)
CALCIUM SERPL-MCNC: 9 MG/DL (ref 8.5–9.9)
CHLORIDE BLD-SCNC: 102 MEQ/L (ref 95–107)
CO2: 35 MEQ/L (ref 20–31)
CREAT SERPL-MCNC: 0.73 MG/DL (ref 0.7–1.2)
EOSINOPHILS ABSOLUTE: 0 K/UL (ref 0–0.7)
EOSINOPHILS RELATIVE PERCENT: 0 %
GFR SERPL CREATININE-BSD FRML MDRD: >60 ML/MIN/{1.73_M2}
GLUCOSE BLD-MCNC: 129 MG/DL (ref 70–99)
HCT VFR BLD CALC: 27.4 % (ref 42–52)
HEMOGLOBIN: 8.9 G/DL (ref 14–18)
LYMPHOCYTES ABSOLUTE: 0.4 K/UL (ref 1–4.8)
LYMPHOCYTES RELATIVE PERCENT: 6 %
MCH RBC QN AUTO: 29 PG (ref 27–31.3)
MCHC RBC AUTO-ENTMCNC: 32.6 % (ref 33–37)
MCV RBC AUTO: 88.9 FL (ref 79–92.2)
MONOCYTES ABSOLUTE: 0.5 K/UL (ref 0.2–0.8)
MONOCYTES RELATIVE PERCENT: 6.4 %
NEUTROPHILS ABSOLUTE: 6.3 K/UL (ref 1.4–6.5)
NEUTROPHILS RELATIVE PERCENT: 87.4 %
PDW BLD-RTO: 20.9 % (ref 11.5–14.5)
PLATELET # BLD: 201 K/UL (ref 130–400)
POTASSIUM REFLEX MAGNESIUM: 3.6 MEQ/L (ref 3.4–4.9)
PRELIMINARY: NORMAL
RBC # BLD: 3.08 M/UL (ref 4.7–6.1)
SODIUM BLD-SCNC: 143 MEQ/L (ref 135–144)
WBC # BLD: 7.2 K/UL (ref 4.8–10.8)

## 2023-03-12 PROCEDURE — 6370000000 HC RX 637 (ALT 250 FOR IP): Performed by: INTERNAL MEDICINE

## 2023-03-12 PROCEDURE — 2580000003 HC RX 258: Performed by: INTERNAL MEDICINE

## 2023-03-12 PROCEDURE — 94664 DEMO&/EVAL PT USE INHALER: CPT

## 2023-03-12 PROCEDURE — 94761 N-INVAS EAR/PLS OXIMETRY MLT: CPT

## 2023-03-12 PROCEDURE — 80048 BASIC METABOLIC PNL TOTAL CA: CPT

## 2023-03-12 PROCEDURE — 85025 COMPLETE CBC W/AUTO DIFF WBC: CPT

## 2023-03-12 PROCEDURE — 36415 COLL VENOUS BLD VENIPUNCTURE: CPT

## 2023-03-12 PROCEDURE — 2700000000 HC OXYGEN THERAPY PER DAY

## 2023-03-12 PROCEDURE — 1210000000 HC MED SURG R&B

## 2023-03-12 PROCEDURE — 6360000002 HC RX W HCPCS: Performed by: INTERNAL MEDICINE

## 2023-03-12 PROCEDURE — 71045 X-RAY EXAM CHEST 1 VIEW: CPT

## 2023-03-12 PROCEDURE — 94640 AIRWAY INHALATION TREATMENT: CPT

## 2023-03-12 PROCEDURE — 99232 SBSQ HOSP IP/OBS MODERATE 35: CPT | Performed by: INTERNAL MEDICINE

## 2023-03-12 RX ORDER — HYDROXYZINE HYDROCHLORIDE 25 MG/1
25 TABLET, FILM COATED ORAL EVERY 6 HOURS PRN
Status: DISCONTINUED | OUTPATIENT
Start: 2023-03-12 | End: 2023-03-18 | Stop reason: HOSPADM

## 2023-03-12 RX ADMIN — METHOCARBAMOL 750 MG: 500 TABLET ORAL at 14:53

## 2023-03-12 RX ADMIN — ALBUTEROL SULFATE 2 PUFF: 90 AEROSOL, METERED RESPIRATORY (INHALATION) at 11:08

## 2023-03-12 RX ADMIN — ASPIRIN 81 MG: 81 TABLET, COATED ORAL at 09:39

## 2023-03-12 RX ADMIN — ATORVASTATIN CALCIUM 80 MG: 80 TABLET, FILM COATED ORAL at 20:41

## 2023-03-12 RX ADMIN — SERTRALINE HYDROCHLORIDE 75 MG: 50 TABLET ORAL at 09:39

## 2023-03-12 RX ADMIN — GUAIFENESIN 600 MG: 600 TABLET, EXTENDED RELEASE ORAL at 20:41

## 2023-03-12 RX ADMIN — EZETIMIBE 10 MG: 10 TABLET ORAL at 20:41

## 2023-03-12 RX ADMIN — Medication 3 MG: at 20:41

## 2023-03-12 RX ADMIN — METHOCARBAMOL 750 MG: 500 TABLET ORAL at 20:41

## 2023-03-12 RX ADMIN — BENZONATATE 100 MG: 100 CAPSULE ORAL at 21:53

## 2023-03-12 RX ADMIN — ALBUTEROL SULFATE 2 PUFF: 90 AEROSOL, METERED RESPIRATORY (INHALATION) at 07:52

## 2023-03-12 RX ADMIN — TAMSULOSIN HYDROCHLORIDE 0.4 MG: 0.4 CAPSULE ORAL at 20:41

## 2023-03-12 RX ADMIN — VANCOMYCIN HYDROCHLORIDE 1250 MG: 1.25 INJECTION, POWDER, LYOPHILIZED, FOR SOLUTION INTRAVENOUS at 15:07

## 2023-03-12 RX ADMIN — CLONAZEPAM 0.25 MG: 0.5 TABLET ORAL at 03:04

## 2023-03-12 RX ADMIN — BENZONATATE 100 MG: 100 CAPSULE ORAL at 14:53

## 2023-03-12 RX ADMIN — ALBUTEROL SULFATE 2 PUFF: 90 AEROSOL, METERED RESPIRATORY (INHALATION) at 19:52

## 2023-03-12 RX ADMIN — ALBUTEROL SULFATE 2 PUFF: 90 AEROSOL, METERED RESPIRATORY (INHALATION) at 15:07

## 2023-03-12 RX ADMIN — GUAIFENESIN 600 MG: 600 TABLET, EXTENDED RELEASE ORAL at 09:39

## 2023-03-12 RX ADMIN — CLONAZEPAM 0.25 MG: 0.5 TABLET ORAL at 14:53

## 2023-03-12 RX ADMIN — Medication 10 ML: at 09:52

## 2023-03-12 RX ADMIN — FINASTERIDE 5 MG: 5 TABLET, FILM COATED ORAL at 09:39

## 2023-03-12 RX ADMIN — HYDROXYZINE HYDROCHLORIDE 25 MG: 25 TABLET ORAL at 21:53

## 2023-03-12 RX ADMIN — PANTOPRAZOLE SODIUM 40 MG: 40 TABLET, DELAYED RELEASE ORAL at 09:39

## 2023-03-12 RX ADMIN — METHOCARBAMOL 750 MG: 500 TABLET ORAL at 09:39

## 2023-03-12 ASSESSMENT — PAIN - FUNCTIONAL ASSESSMENT: PAIN_FUNCTIONAL_ASSESSMENT: ACTIVITIES ARE NOT PREVENTED

## 2023-03-12 ASSESSMENT — PAIN SCALES - GENERAL
PAINLEVEL_OUTOF10: 0

## 2023-03-12 NOTE — PROGRESS NOTES
[facility] Respiratory Therapy Evaluation   Current Order:  Albuterol MDI 2 puffs QID & Q2 PRN      Home Regimen: PRN      Ordering Physician: Altagracia Esparza  Re-evaluation Date:  3/15     Diagnosis: Covid 19      Patient Status: Stable / Unstable + Physician notified    The following MDI Criteria must be met in order to convert aerosol to MDI with spacer.  If unable to meet, MDI will be converted to aerosol:  []  Patient able to demonstrate the ability to use MDI effectively  []  Patient alert and cooperative  []  Patient able to take deep breath with 5-10 second hold  []  Medication(s) available in this delivery method   []  Peak flow greater than or equal to 200 ml/min            Current Order Substituted To  (same drug, same frequency)   Aerosol to MDI [] Albuterol Sulfate 0.083% unit dose by aerosol Albuterol Sulfate MDI 2 puffs by inhalation with spacer    [] Levalbuterol 1.25 mg unit dose by aerosol Levalbuterol MDI 2 puffs by inhalation with spacer    [] Levalbuterol 0.63 mg unit dose by aerosol Levalbuterol MDI 2 puffs by inhalation with spacer    [] Ipratropium Bromide 0.02% unit dose by aerosol Ipratropium Bromide MDI 2 puffs by inhalation with spacer    [] Duoneb (Ipratropium + Albuterol) unit dose by aerosol Ipratropium MDI + Albuterol MDI 2 puffs by inhalation w/spacer   MDI to Aerosol [] Albuterol Sulfate MDI Albuterol Sulfate 0.083% unit dose by aerosol    [] Levalbuterol MDI 2 puffs by inhalation Levalbuterol 1.25 mg unit dose by aerosol    [] Ipratropium Bromide MDI by inhalation Ipratropium Bromide 0.02% unit dose by aerosol    [] Combivent (Ipratropium + Albuterol) MDI by inhalation Duoneb (Ipratropium + Albuterol) unit dose by aerosol       Treatment Assessment [Frequency/Schedule]:  Change frequency to: ________No Changes__________________________________________per Protocol, P&T, MEC      Points 0 1 2 3 4   Pulmonary Status  Non-Smoker  [x]   Smoking history   < 20 pack years  []   Smoking history  ? 20 pack years  []   Pulmonary Disorder  (acute or chronic)  []   Severe or Chronic w/ Exacerbation  []     Surgical Status No [x]   Surgeries     General []   Surgery Lower []   Abdominal Thoracic or []   Upper Abdominal Thoracic with  PulmonaryDisorder  []     Chest X-ray Clear/Not  Ordered     []  Chronic Changes  Results Pending  []  Infiltrates, atelectasis, pleural effusion, or edema  [x]  Infiltrates in more than one lobe []  Infiltrate + Atelectasis, &/or pleural effusion  []    Respiratory Pattern Regular,  RR = 12-20 [x]  Increased,  RR = 21-25 []  HUERTA, irregular,  or RR = 26-30 []  Decreased FEV1  or RR = 31-35 []  Severe SOB, use  of accessory muscles, or RR ? 35  []    Mental Status Alert, oriented,  Cooperative [x]  Confused but Follows commands []  Lethargic or unable to follow commands []  Obtunded  []  Comatose  []    Breath Sounds Clear to  auscultation  []  Decreased unilaterally or  in bases only []  Decreased  bilaterally  [x]  Crackles or intermittent wheezes []  Wheezes []    Cough Strong, Spontan., & nonproductive []  Strong,  spontaneous, &  productive [x]  Weak,  Nonproductive []  Weak, productive or  with wheezes []  No spontaneous  cough or may require suctioning []    Level of Activity Ambulatory []  Ambulatory w/ Assist  [x]  Non-ambulatory []  Paraplegic []  Quadriplegic []    Total    Score:___6____     Triage Score:___4_____      Tri       Triage:     1. (>20) Freq: Q3    2. (16-20) Freq: Q4   3. (11-15) Freq: QID & Albuterol Q2 PRN    4. (6-10) Freq: TID & Albuterol Q2 PRN    5. (0-5) Freq Q4prn

## 2023-03-12 NOTE — FLOWSHEET NOTE
Shift assessment completed. Pt alert et oriented x4. Remains on droplet plus precautions d/t dx. Covid. No cough noted at this time. Respirations even et non labored. Denies chest pain or sob. .Electronically signed by Myrna Charles RN on 3/11/2023 at 10:48 PM

## 2023-03-12 NOTE — PROGRESS NOTES
Physician Progress Note    3/12/2023   4:37 PM    Name:  Emilie Hu  MRN:    [id number]     IP Day: 4     Admit Date: 3/8/2023  3:27 PM  PCP: Rolanda Urbina MD    Code Status:  Full Code    Subjective:     He misses his wife. He continues to cough up blood-tinged sputum. He feels his dyspnea is improving.     Current Facility-Administered Medications   Medication Dose Route Frequency Provider Last Rate Last Admin    vancomycin (VANCOCIN) intermittent dosing (placeholder)   Other RX Placeholder Mitzi Rico MD        vancomycin (VANCOCIN) 1,250 mg in sodium chloride 0.9 % 250 mL IVPB (ADDAVIAL)  1,250 mg IntraVENous Once Mitzi Rico .7 mL/hr at 03/12/23 1507 1,250 mg at 03/12/23 1507    Followed by    Tracy Wallace ON 3/13/2023] vancomycin 1000 mg IVPB in 250 mL NS addavial  1,000 mg IntraVENous Q12H Mitzi Rico MD        hydrOXYzine HCl (ATARAX) tablet 25 mg  25 mg Oral Q6H PRN Merribeth Last, DO        albuterol sulfate HFA (PROVENTIL;VENTOLIN;PROAIR) 108 (90 Base) MCG/ACT inhaler 2 puff  2 puff Inhalation Q2H PRN Merribegayle Ni, DO        furosemide (LASIX) injection 40 mg  40 mg IntraVENous Once Delmis Osman MD        aspirin EC tablet 81 mg  81 mg Oral Daily Delmis Osman MD   81 mg at 03/12/23 0939    atorvastatin (LIPITOR) tablet 80 mg  80 mg Oral Nightly Delmis Osman MD   80 mg at 03/11/23 2107    ezetimibe (ZETIA) tablet 10 mg  10 mg Oral Nightly Merribeth Kinds, DO   10 mg at 03/11/23 2107    finasteride (PROSCAR) tablet 5 mg  5 mg Oral Daily Merribeth Kinds, DO   5 mg at 03/12/23 6618    melatonin tablet 3 mg  3 mg Oral Nightly Merribeth Kinds, DO   3 mg at 03/11/23 2108    methocarbamol (ROBAXIN) tablet 750 mg  750 mg Oral TID Merribeth Kinds, DO   750 mg at 03/12/23 1453    pantoprazole (PROTONIX) tablet 40 mg  40 mg Oral Daily Merribeth Kinds, DO   40 mg at 03/12/23 0939    sertraline (ZOLOFT) tablet 75 mg  75 mg Oral Daily Merribegayle Ni, DO   75 mg at 03/12/23 0939    tamsulosin (FLOMAX) capsule 0.4 mg  0.4 mg Oral Nightly Laxmi Laura, DO   0.4 mg at 23 2107    trimethobenzamide (TIGAN) injection 200 mg  200 mg IntraMUSCular Q6H PRN Laxmi Laura, DO        clonazePAM (KLONOPIN) tablet 0.25 mg  0.25 mg Oral Q12H Jesus Ray MD   0.25 mg at 23 1453    0.9 % sodium chloride bolus  1,000 mL IntraVENous Once Laxmi Laura, DO   Held at 23 1628    sodium chloride flush 0.9 % injection 5-40 mL  5-40 mL IntraVENous 2 times per day Laxmi Laura, DO   10 mL at 23 5324    sodium chloride flush 0.9 % injection 5-40 mL  5-40 mL IntraVENous PRN Laxmi Laura, DO        0.9 % sodium chloride infusion   IntraVENous PRN Laxmi Laura, DO        polyethylene glycol (GLYCOLAX) packet 17 g  17 g Oral Daily PRN Laxmi Laura, DO        acetaminophen (TYLENOL) tablet 650 mg  650 mg Oral Q6H PRN Laxmi Laura, DO        Or    acetaminophen (TYLENOL) suppository 650 mg  650 mg Rectal Q6H PRN Laxmi Laura, DO        guaiFENesin [facility]) extended release tablet 600 mg  600 mg Oral BID Laxmi Laura, DO   600 mg at 23 6601    benzonatate (TESSALON) capsule 100 mg  100 mg Oral TID PRN Laxmi Laura, DO   100 mg at 23 1453    guaiFENesin-dextromethorphan (ROBITUSSIN DM) 100-10 MG/5ML syrup 5 mL  5 mL Oral Q4H PRN Laxmi Laura, DO        albuterol sulfate HFA (PROVENTIL;VENTOLIN;PROAIR) 108 (90 Base) MCG/ACT inhaler 2 puff  2 puff Inhalation 4x daily Laxmi Laura, DO   2 puff at 23 1507       Physical Examination:      Vitals:  BP 94/62   Pulse 80   Temp 97.5 °F (36.4 °C)   Resp 18   Ht 5' 11\" (1.803 m)   Wt 180 lb (81.6 kg)   SpO2 96%   BMI 25.10 kg/m²   Temp (24hrs), Av.8 °F (36.6 °C), Min:97.5 °F (36.4 °C), Max:97.9 °F (36.6 °C)      General appearance: alert, cooperative and no distress. Elderly and chronically ill-appearing but able to have normal conversation.   Oriented to person, hospital.  He thought month was February  Lungs: Diminished on the left, normal effort  Heart: regular rate and rhythm, no murmur  Abdomen: soft, nontender, nondistended, bowel sounds present, no masses  Extremities: No pitting edema. No redness, tenderness in the calves. Cap refill <2s  Skin: no gross lesions, rashes    Data:     Labs:  Recent Labs     03/11/23 0447 03/12/23  0604   WBC 10.5 7.2   HGB 8.5* 8.9*    201     Recent Labs     03/11/23  0447 03/12/23  0604    143   K 3.5 3.6    102   CO2 34* 35*   BUN 35* 30*   CREATININE 0.91 0.73   GLUCOSE 110* 129*     No results for input(s): AST, ALT, ALB, BILITOT, ALKPHOS in the last 72 hours. Assessment and Plan:        1. Acute respiratory failure with hypoxia suspect primarily due to atelectasis related to mucous plugging, with superimposed Staph aureus pneumonia. S/p bronchoscopy on 3/10 with thick white secretions noted throughout tracheobronchial tree worse on the left with complete obstruction of left mainstem bronchus. Mucosa easily bleeds on suction trauma. Secretions were washed and cleaned  -Completed course of steroids  -Antibiotic per pulmonology. Follow final cultures from bronchoscopy  -Supportive respiratory care and pulmonary toilet  -TTE not performed by staff due to + Covid. 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      37 minutes in total care time    Electronically signed by Jen Barton DO on 3/12/2023 at 4:37 PM

## 2023-03-12 NOTE — PROGRESS NOTES
[address] [facility] Pharmacokinetic Monitoring Service - Vancomycin     Ulises Mir is a 76 y.o. male starting on vancomycin therapy for Aspiration Pneumonia. Pharmacy consulted by Dr. Shala Ball for monitoring and adjustment. Target Concentration: Goal AUC/ALISSON 400-600 mg*hr/L    Additional Antimicrobials: None    Pertinent Laboratory Values: Wt Readings from Last 1 Encounters:   03/08/23 180 lb (81.6 kg)     Temp Readings from Last 1 Encounters:   03/12/23 97.5 °F (36.4 °C)     Estimated Creatinine Clearance: 87 mL/min (based on SCr of 0.73 mg/dL). Recent Labs     03/11/23  0447 03/12/23  0604   CREATININE 0.91 0.73   WBC 10.5 7.2     Procalcitonin   Date Value Ref Range Status   03/08/2023 0.07 0.00 - 0.15 ng/mL Final     Comment:     Suspected Sepsis:  Low likelihood of sepsis  <.50 ng/mL    Increased likelihood of sepsis 0.50-2.00 ng/mL  Antibiotics encouraged    High risk of sepsis/shock   >2.00 ng/mL  Antibiotics strongly encouraged    Suspected Lower Respiratory Tract Infections:  Low likelihood of bacterial infection  <0.24 ng/mL    Increased likelihood of bacterial infection >0.24 ng/mL  Antibiotics encouraged    With successful antibiotic therapy, PCT levels should decrease  rapidly. (Half-life of 24 to 36 hours.)    Procalcitonin values from samples collected within the first  6 hours of systemic infection may still be low. Retesting may be indicated. Values from day 1 and day 4 can be entered into the Change in  Procalcitonin Calculator to determine the patient's  Mortality Risk Prognosis  ([email or web address]-pct-calculator. com)    In healthy neonates, plasma Procalcitonin (PCT) concentrations  increase gradually after birth, reaching peak values at about  24 hours of age then decrease to normal values below 0.5 ng/mL  by 48-72 hours of age.        Pertinent Cultures:  Procedure Component Value Units Date/Time   Culture, Respiratory [[id number]] (Abnormal) Collected: 03/10/23 0921   Order Status: Completed Specimen: Body Fluid from Lung Updated: 03/11/23 1336    CULTURE, RESPIRATORY -- Abnormal     Direct Exam:  MANY NEUTROPHILS   Direct Exam:  MANY GRAM POSITIVE COCCI IN CLUSTERS   Performed at [facility] [address] [address]   [phone]    Abnormal     Organism Staphylococcus aureus Abnormal     CULTURE, RESPIRATORY >098750 CFU/ML   Narrative:     ORDER#: [id number]                          ORDERED BY: Santi Olmos   SOURCE: Lung Body Fluid                    COLLECTED:  03/10/23 09:21   ANTIBIOTICS AT [facility].:                      RECEIVED :  03/10/23 10:00   Culture, Respiratory [[id number]] (Abnormal) Collected: 03/10/23 0921   Order Status: Completed Specimen: Body Fluid from Lung Updated: 03/11/23 1335    CULTURE, RESPIRATORY -- Abnormal     Direct Exam:  MODERATE NEUTROPHILS   Direct Exam:  FEW GRAM POSITIVE COCCI IN CLUSTERS   Performed at [address]   [phone]    Abnormal     Organism Staphylococcus aureus Abnormal     CULTURE, RESPIRATORY 10 to 50,000 CFU/ML   Narrative:     ORDER#: [id number]                          ORDERED BY: Santi Olmos   SOURCE: Lung Body Fluid                    COLLECTED:  03/10/23 09:21   ANTIBIOTICS AT [facility]IK[facility].:                      RECEIVED :  03/10/23 10:06   Culture with Smear, Acid Fast Bacillius [[id number]] Collected: 03/10/23 1007   Order Status: No result Updated: 03/10/23 1008   Culture, Fungus [[id number]] Collected: 03/10/23 0921   Order Status: Sent Specimen: Body Fluid from Lung Updated: 03/10/23 1006   Culture, Fungus [[id number]] Collected: 03/10/23 1973   Order Status: Sent Specimen: Body Fluid from Lung Updated: 03/10/23 1000   Culture with Smear, Acid Fast Bacillius [[id number]] Collected: 03/10/23 8131   Order Status: Sent Specimen: Body Fluid from Lung Updated: 03/10/23 1000   Gram Stain [[id number]] Collected: 03/10/23 5929   Order Status: Sent Specimen:  Body Fluid from Lung    Gram Stain [[id number]] Collected: 03/10/23 7765   Order Status: Sent Specimen: Body Fluid from Lung    Culture, Acid Fast Bacillius [[id number]] Collected: 03/10/23 8851   Order Status: Sent Specimen: Body Fluid from Lung    Blood Culture 1 [[id number]] Collected: 03/08/23 1625   Order Status: Completed Specimen: Blood Updated: 03/09/23 1815    Blood Culture, Routine No Growth to date. Any change in status will be called. Narrative:     ORDER#: [id number]                          ORDERED BY: Missy Gong   SOURCE: Blood                              COLLECTED:  03/08/23 16:25   ANTIBIOTICS AT [facility].:                      RECEIVED :  03/08/23 16:25   Blood Culture 2 [[id number]] Collected: 03/08/23 1621   Order Status: Completed Specimen: Blood Updated: 03/09/23 1815    Blood Culture, Routine No Growth to date. Any change in status will be called.    Narrative:     ORDER#: [id number]                          ORDERED BY: Missy Gong   SOURCE: Blood                              COLLECTED:  03/08/23 16:21   ANTIBIOTICS AT [facility].:                      RECEIVED :  03/08/23 16:26       Plan:  Dosing recommendations based on Bayesian software  Start vancomycin 1250 mg loading dose followed by 1000 mg Q12H  Anticipated AUC of 518 mg/L.hr and trough concentration of 16.6 mg/L at steady state  Renal labs as indicated   Vancomycin concentration ordered for 3/14 @ 0600   Pharmacy will continue to monitor patient and adjust therapy as indicated    Thank you for the consult,    Claude Boor, PharmD  PGY1 Pharmacy Resident  3/12/2023 1:48 PM

## 2023-03-12 NOTE — PROGRESS NOTES
Pulmonary Progress Note    3/12/2023 1:24 PM    Subjective:   Admit Date: 3/8/2023  PCP: Cheng Chavez MD    Chief Complaint   Patient presents with    Shortness of Breath     Pt was diagnosed with COVID 1 week ago and has had increased SOB and work of breathing. Pt was a wet cough noted. Pt on 4 L NC. Pt given 1 du neb and 125 mg solumedrol PTA. Pt is a/ox4. Interval History: Continues to cough and produce some sputum. Sputum is tinged with blood. Feels about the same. Continues to note liters of oxygen. Sputum culture is growing Staph aureus. 12 points review of systems has been obtained and negative except to was mentioned in HPI.      Medications:   Scheduled Meds:   cefTRIAXone (ROCEPHIN) IV  1,000 mg IntraVENous Q24H    furosemide  40 mg IntraVENous Once    aspirin  81 mg Oral Daily    atorvastatin  80 mg Oral Nightly    ezetimibe  10 mg Oral Nightly    finasteride  5 mg Oral Daily    melatonin  3 mg Oral Nightly    methocarbamol  750 mg Oral TID    pantoprazole  40 mg Oral Daily    sertraline  75 mg Oral Daily    tamsulosin  0.4 mg Oral Nightly    clonazePAM  0.25 mg Oral Q12H    sodium chloride  1,000 mL IntraVENous Once    sodium chloride flush  5-40 mL IntraVENous 2 times per day    guaiFENesin  600 mg Oral BID    albuterol sulfate HFA  2 puff Inhalation 4x daily     Continuous Infusions:   sodium chloride           Objective:   Vitals:   Temp (24hrs), Av.8 °F (36.6 °C), Min:97.5 °F (36.4 °C), Max:97.9 °F (36.6 °C)    BP 94/62   Pulse 80   Temp 97.5 °F (36.4 °C)   Resp 18   Ht 5' 11\" (1.803 m)   Wt 180 lb (81.6 kg)   SpO2 96%   BMI 25.10 kg/m²   I/O:24HR INTAKE/OUTPUT:    Intake/Output Summary (Last 24 hours) at 3/12/2023 1324  Last data filed at 3/11/2023 1853  Gross per 24 hour   Intake 27.24 ml   Output 425 ml   Net -397.76 ml        0701 -  0700  In: 27.2   Out: 425 [Urine:425]  CVP:             FiO2 : 50 %     Physical Exam:  General appearance - alert, ill appearing, and in mild distress  Mental status - alert, oriented to person, place, and time  Eyes - pupils equal and reactive, extraocular eye movements intact  Nose - normal and patent, no erythema, discharge or polyps  Neck - supple, no significant adenopathy  Chest -diminished bilaterally to auscultation, no wheezes, bilateral rhonchi, symmetric air entry  Heart - normal rate, regular rhythm, normal S1, S2, no murmurs, rubs, clicks or gallops  Abdomen - soft, nontender, nondistended, no masses or organomegaly  Rectal - deferred, not clinically indicated  Neurological - alert, oriented, normal speech, no focal findings or movement disorder noted, motor and sensory grossly normal bilaterally  Musculoskeletal - no joint tenderness, deformity or swelling  Extremities - peripheral pulses normal, no pedal edema, no clubbing or cyanosis  Skin - normal coloration and turgor, no rashes, no suspicious skin lesions noted              BMP:    Recent Labs     03/10/23  0540 03/11/23  0447 03/12/23  0604    142 143   K 3.4 3.5 3.6    102 102   CO2 33* 34* 35*   BUN 46* 35* 30*   CREATININE 1.05 0.91 0.73   GLUCOSE 111* 110* 129*      . MG:3,PHOS:3)@  Ionized Calcium: No results found for: IONCA  CBC:   Recent Labs     03/11/23  0447 03/12/23  0604   WBC 10.5 7.2   HGB 8.5* 8.9*    201        ABG: No results for input(s): PH, PCO2, PO2 in the last 72 hours. Assessment and Plan:     Impression:    -Acute on chronic hypoxic respiratory failure. Currently on 4 L of oxygen.  -Left lung collapse. Status post bronchoscopy with aspiration of tracheobronchial tree. Overall improvement in atelectasis. -Staph aureus pneumonia  -COVID-19 infection. -5 mm nodule. -Pleural plaques likely due to asbestos exposure. -Bilateral effusion. Recommendations:    -Continue oxygen. Wean for saturation above 90%.   -Continue droplet plus isolation.  -We will add vancomycin per pharmacy dosing while waiting for sensitivity. -  DC ceftriaxone.   -Aspiration precautions.  -Strict intake and output measurement. - CXR today.        Electronically signed by Maryjane Schultz MD on 3/12/2023 at 1:24 PM

## 2023-03-12 NOTE — PROGRESS NOTES
Shift assessment complete. Meds as ordered. Free from falls/injuries. No new skin impairments noted/reported. Droplet precautions maintained. Avasys remains at bedside. Patient has a productive cough at times. Bed in lowest position. Call light within reach. Will continue to monitor.

## 2023-03-13 PROBLEM — U07.1 COVID-19: Status: ACTIVE | Noted: 2023-03-13

## 2023-03-13 PROBLEM — J98.11 COLLAPSE OF LEFT LUNG: Status: ACTIVE | Noted: 2023-03-13

## 2023-03-13 LAB
AFB STAIN: NORMAL
ANION GAP SERPL CALCULATED.3IONS-SCNC: 7 MEQ/L (ref 9–15)
BASOPHILS ABSOLUTE: 0 K/UL (ref 0–0.2)
BASOPHILS RELATIVE PERCENT: 0.1 %
BLOOD CULTURE, ROUTINE: NORMAL
BLOOD CULTURE, ROUTINE: NORMAL
BUN BLDV-MCNC: 30 MG/DL (ref 8–23)
CALCIUM SERPL-MCNC: 9 MG/DL (ref 8.5–9.9)
CHLORIDE BLD-SCNC: 102 MEQ/L (ref 95–107)
CO2: 34 MEQ/L (ref 20–31)
CREAT SERPL-MCNC: 0.77 MG/DL (ref 0.7–1.2)
CULTURE, RESPIRATORY: ABNORMAL
EOSINOPHILS ABSOLUTE: 0.1 K/UL (ref 0–0.7)
EOSINOPHILS RELATIVE PERCENT: 0.8 %
GFR SERPL CREATININE-BSD FRML MDRD: >60 ML/MIN/{1.73_M2}
GLUCOSE BLD-MCNC: 94 MG/DL (ref 70–99)
HCT VFR BLD CALC: 28.5 % (ref 42–52)
HEMOGLOBIN: 9.1 G/DL (ref 14–18)
LV EF: 10 %
LVEF MODALITY: NORMAL
LYMPHOCYTES ABSOLUTE: 0.8 K/UL (ref 1–4.8)
LYMPHOCYTES RELATIVE PERCENT: 8 %
MAGNESIUM: 1.8 MG/DL (ref 1.7–2.4)
MCH RBC QN AUTO: 28.1 PG (ref 27–31.3)
MCHC RBC AUTO-ENTMCNC: 31.9 % (ref 33–37)
MCV RBC AUTO: 87.9 FL (ref 79–92.2)
MONOCYTES ABSOLUTE: 0.9 K/UL (ref 0.2–0.8)
MONOCYTES RELATIVE PERCENT: 8.8 %
NEUTROPHILS ABSOLUTE: 8.4 K/UL (ref 1.4–6.5)
NEUTROPHILS RELATIVE PERCENT: 82.3 %
ORGANISM: ABNORMAL
ORGANISM: ABNORMAL
PDW BLD-RTO: 20.8 % (ref 11.5–14.5)
PLATELET # BLD: 235 K/UL (ref 130–400)
POTASSIUM REFLEX MAGNESIUM: 3.3 MEQ/L (ref 3.4–4.9)
PRELIMINARY: NORMAL
RBC # BLD: 3.25 M/UL (ref 4.7–6.1)
SODIUM BLD-SCNC: 143 MEQ/L (ref 135–144)
WBC # BLD: 10.2 K/UL (ref 4.8–10.8)

## 2023-03-13 PROCEDURE — 99232 SBSQ HOSP IP/OBS MODERATE 35: CPT | Performed by: INTERNAL MEDICINE

## 2023-03-13 PROCEDURE — 83735 ASSAY OF MAGNESIUM: CPT

## 2023-03-13 PROCEDURE — C8929 TTE W OR WO FOL WCON,DOPPLER: HCPCS

## 2023-03-13 PROCEDURE — 80048 BASIC METABOLIC PNL TOTAL CA: CPT

## 2023-03-13 PROCEDURE — 2700000000 HC OXYGEN THERAPY PER DAY

## 2023-03-13 PROCEDURE — 2580000003 HC RX 258: Performed by: INTERNAL MEDICINE

## 2023-03-13 PROCEDURE — 85025 COMPLETE CBC W/AUTO DIFF WBC: CPT

## 2023-03-13 PROCEDURE — 1210000000 HC MED SURG R&B

## 2023-03-13 PROCEDURE — 6360000002 HC RX W HCPCS: Performed by: INTERNAL MEDICINE

## 2023-03-13 PROCEDURE — 94761 N-INVAS EAR/PLS OXIMETRY MLT: CPT

## 2023-03-13 PROCEDURE — 36415 COLL VENOUS BLD VENIPUNCTURE: CPT

## 2023-03-13 PROCEDURE — 6370000000 HC RX 637 (ALT 250 FOR IP): Performed by: INTERNAL MEDICINE

## 2023-03-13 PROCEDURE — 94640 AIRWAY INHALATION TREATMENT: CPT

## 2023-03-13 RX ORDER — POTASSIUM CHLORIDE 20 MEQ/1
40 TABLET, EXTENDED RELEASE ORAL ONCE
Status: COMPLETED | OUTPATIENT
Start: 2023-03-13 | End: 2023-03-13

## 2023-03-13 RX ADMIN — VANCOMYCIN HYDROCHLORIDE 1000 MG: 1 INJECTION, POWDER, LYOPHILIZED, FOR SOLUTION INTRAVENOUS at 17:07

## 2023-03-13 RX ADMIN — GUAIFENESIN 600 MG: 600 TABLET, EXTENDED RELEASE ORAL at 20:51

## 2023-03-13 RX ADMIN — ALBUTEROL SULFATE 2 PUFF: 90 AEROSOL, METERED RESPIRATORY (INHALATION) at 20:18

## 2023-03-13 RX ADMIN — VANCOMYCIN HYDROCHLORIDE 1000 MG: 1 INJECTION, POWDER, LYOPHILIZED, FOR SOLUTION INTRAVENOUS at 05:49

## 2023-03-13 RX ADMIN — SERTRALINE HYDROCHLORIDE 75 MG: 50 TABLET ORAL at 08:31

## 2023-03-13 RX ADMIN — EZETIMIBE 10 MG: 10 TABLET ORAL at 20:50

## 2023-03-13 RX ADMIN — HYDROXYZINE HYDROCHLORIDE 25 MG: 25 TABLET ORAL at 10:01

## 2023-03-13 RX ADMIN — ALBUTEROL SULFATE 2 PUFF: 90 AEROSOL, METERED RESPIRATORY (INHALATION) at 11:04

## 2023-03-13 RX ADMIN — PANTOPRAZOLE SODIUM 40 MG: 40 TABLET, DELAYED RELEASE ORAL at 08:30

## 2023-03-13 RX ADMIN — ATORVASTATIN CALCIUM 80 MG: 80 TABLET, FILM COATED ORAL at 20:50

## 2023-03-13 RX ADMIN — METHOCARBAMOL 750 MG: 500 TABLET ORAL at 20:51

## 2023-03-13 RX ADMIN — ALBUTEROL SULFATE 2 PUFF: 90 AEROSOL, METERED RESPIRATORY (INHALATION) at 15:00

## 2023-03-13 RX ADMIN — Medication 10 ML: at 08:32

## 2023-03-13 RX ADMIN — HYDROXYZINE HYDROCHLORIDE 25 MG: 25 TABLET ORAL at 20:54

## 2023-03-13 RX ADMIN — Medication 3 MG: at 20:50

## 2023-03-13 RX ADMIN — BENZONATATE 100 MG: 100 CAPSULE ORAL at 20:51

## 2023-03-13 RX ADMIN — TAMSULOSIN HYDROCHLORIDE 0.4 MG: 0.4 CAPSULE ORAL at 20:51

## 2023-03-13 RX ADMIN — ALBUTEROL SULFATE 2 PUFF: 90 AEROSOL, METERED RESPIRATORY (INHALATION) at 07:28

## 2023-03-13 RX ADMIN — GUAIFENESIN 600 MG: 600 TABLET, EXTENDED RELEASE ORAL at 08:30

## 2023-03-13 RX ADMIN — POTASSIUM CHLORIDE 40 MEQ: 1500 TABLET, EXTENDED RELEASE ORAL at 15:56

## 2023-03-13 RX ADMIN — FINASTERIDE 5 MG: 5 TABLET, FILM COATED ORAL at 08:30

## 2023-03-13 RX ADMIN — Medication 10 ML: at 20:54

## 2023-03-13 ASSESSMENT — PAIN SCALES - GENERAL
PAINLEVEL_OUTOF10: 0
PAINLEVEL_OUTOF10: 0

## 2023-03-13 ASSESSMENT — ENCOUNTER SYMPTOMS
COUGH: 1
GASTROINTESTINAL NEGATIVE: 1
SHORTNESS OF BREATH: 1
EYES NEGATIVE: 1
STRIDOR: 0
WHEEZING: 0
BLOOD IN STOOL: 0
CHEST TIGHTNESS: 0
NAUSEA: 0

## 2023-03-13 ASSESSMENT — PAIN DESCRIPTION - DESCRIPTORS: DESCRIPTORS: OTHER (COMMENT)

## 2023-03-13 NOTE — CARE COORDINATION
Pt not medically ready for discharge per physician, likely 1-2 days. Call to [facility] with [facility] with update. Call to pt spouse to update and no answer and unable to leave .

## 2023-03-13 NOTE — PROGRESS NOTES
Physician Progress Note    3/13/2023   1:18 PM    Name:  Abdelrahman Ocampo  MRN:    [id number]     IP Day: 5     Admit Date: 3/8/2023  3:27 PM  PCP: Rian Teran MD    Code Status:  Full Code    Subjective:     He still has a significant cough with shortness of breath.     Current Facility-Administered Medications   Medication Dose Route Frequency Provider Last Rate Last Admin    vancomycin (VANCOCIN) intermittent dosing (placeholder)   Other RX Bhavesh Virk MD        vancomycin 1000 mg IVPB in 250 mL NS addavial  1,000 mg IntraVENous Q12H Elder Bueno  mL/hr at 03/13/23 0549 1,000 mg at 03/13/23 0549    hydrOXYzine HCl (ATARAX) tablet 25 mg  25 mg Oral Q6H PRN Clotilda Mad, DO   25 mg at 03/13/23 1001    albuterol sulfate HFA (PROVENTIL;VENTOLIN;PROAIR) 108 (90 Base) MCG/ACT inhaler 2 puff  2 puff Inhalation Q2H PRN Clotilda Mad, DO        furosemide (LASIX) injection 40 mg  40 mg IntraVENous Once Maria Isabel Brunner MD        aspirin EC tablet 81 mg  81 mg Oral Daily Maria Isabel Brunner MD   81 mg at 03/12/23 0939    atorvastatin (LIPITOR) tablet 80 mg  80 mg Oral Nightly Maria Isabel Brunner MD   80 mg at 03/12/23 2041    ezetimibe (ZETIA) tablet 10 mg  10 mg Oral Nightly Clotilda Mad, DO   10 mg at 03/12/23 2041    finasteride (PROSCAR) tablet 5 mg  5 mg Oral Daily Clotilda Mad, DO   5 mg at 03/13/23 0830    melatonin tablet 3 mg  3 mg Oral Nightly Clotilda Mad, DO   3 mg at 03/12/23 2041    methocarbamol (ROBAXIN) tablet 750 mg  750 mg Oral TID Clotilda Mad, DO   750 mg at 03/12/23 2041    pantoprazole (PROTONIX) tablet 40 mg  40 mg Oral Daily Clotilda Mad, DO   40 mg at 03/13/23 0830    sertraline (ZOLOFT) tablet 75 mg  75 mg Oral Daily Clotilda Mad, DO   75 mg at 03/13/23 0831    tamsulosin (FLOMAX) capsule 0.4 mg  0.4 mg Oral Nightly Clotilda Mad, DO   0.4 mg at 03/12/23 2041    trimethobenzamide (TIGAN) injection 200 mg  200 mg IntraMUSCular Q6H PRN Serina Morris DO clonazePAM (KLONOPIN) tablet 0.25 mg  0.25 mg Oral Q12H Yary Marcus MD   0.25 mg at 23 1453    0.9 % sodium chloride bolus  1,000 mL IntraVENous Once Mollie Sorrel, DO   Held at 23 1628    sodium chloride flush 0.9 % injection 5-40 mL  5-40 mL IntraVENous 2 times per day Mollie Sorrel, DO   10 mL at 23 6712    sodium chloride flush 0.9 % injection 5-40 mL  5-40 mL IntraVENous PRN Mollie Sorrel, DO        0.9 % sodium chloride infusion   IntraVENous PRN Mollie Sorrel, DO        polyethylene glycol (GLYCOLAX) packet 17 g  17 g Oral Daily PRN Mollie Sorrel, DO        acetaminophen (TYLENOL) tablet 650 mg  650 mg Oral Q6H PRN Mollie Sorrel, DO        Or    acetaminophen (TYLENOL) suppository 650 mg  650 mg Rectal Q6H PRN Mollie Sorrel, DO        guaiFENesin [facility] [place] [facility]\"") extended release tablet 600 mg  600 mg Oral BID Mollie Sorrel, DO   600 mg at 23 0830    benzonatate (TESSALON) capsule 100 mg  100 mg Oral TID PRN Mollie Sorrel, DO   100 mg at 23 2153    guaiFENesin-dextromethorphan (ROBITUSSIN DM) 100-10 MG/5ML syrup 5 mL  5 mL Oral Q4H PRN Mollie Sorrel, DO        albuterol sulfate HFA (PROVENTIL;VENTOLIN;PROAIR) 108 (90 Base) MCG/ACT inhaler 2 puff  2 puff Inhalation 4x daily Mollie Sorrel, DO   2 puff at 23 1104       Physical Examination:      Vitals:  /68   Pulse 74   Temp 98.1 °F (36.7 °C) (Oral)   Resp 18   Ht 5' 11\" (1.803 m)   Wt 180 lb (81.6 kg)   SpO2 97%   BMI 25.10 kg/m²   Temp (24hrs), Av.9 °F (36.6 °C), Min:97.6 °F (36.4 °C), Max:98.1 °F (36.7 °C)      General appearance: alert, cooperative and no distress. Elderly and chronically ill-appearing but able to have normal conversation. Oriented to person, hospital.  He thought month was February  Lungs: Rhonchi appreciated  Heart: regular rate and rhythm, no murmur  Abdomen: soft, nontender, nondistended, bowel sounds present, no masses  Extremities: No pitting edema.   No redness, tenderness in the calves. Cap refill <2s  Skin: no gross lesions, rashes    Data:     Labs:  Recent Labs     03/12/23  0604 03/13/23  0502   WBC 7.2 10.2   HGB 8.9* 9.1*    235       Recent Labs     03/12/23  0604 03/13/23  0502    143   K 3.6 3.3*    102   CO2 35* 34*   BUN 30* 30*   CREATININE 0.73 0.77   GLUCOSE 129* 94       No results for input(s): AST, ALT, ALB, BILITOT, ALKPHOS in the last 72 hours. Assessment and Plan:        1. Acute respiratory failure with hypoxia suspect primarily due to atelectasis related to mucous plugging, with superimposed Staph aureus pneumonia. S/p bronchoscopy on 3/10 with thick white secretions noted throughout tracheobronchial tree worse on the left with complete obstruction of left mainstem bronchus. Mucosa easily bleeds on suction trauma. Secretions were washed and cleaned  -Completed course of steroids  -Antibiotic per pulmonology. 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      37 minutes in total care time    Electronically signed by Michael Springer MD on 3/13/2023 at 1:18 PM

## 2023-03-13 NOTE — PROGRESS NOTES
INPATIENT PROGRESS NOTES    PATIENT NAME: Mireya Quigley  MRN: [id number]  SERVICE DATE:  March 13, 2023   SERVICE TIME:  12:54 PM      PRIMARY SERVICE: Pulmonary Disease    CHIEF COMPLAIN: Shortness of breath      INTERVAL HPI: Patient seen and examined at bedside, Interval Notes, orders reviewed. Nursing notes noted  He is on 3 L O2 via nasal cannula O2 saturation 97%. He has moist cough. No fever or chills. No nausea vomiting or diarrhea. OBJECTIVE    Body mass index is 25.1 kg/m². PHYSICAL EXAM:  Vitals:  /68   Pulse 74   Temp 98.1 °F (36.7 °C) (Oral)   Resp 18   Ht 5' 11\" (1.803 m)   Wt 180 lb (81.6 kg)   SpO2 97%   BMI 25.10 kg/m²   General: Alert, awake . comfortable in bed, No distress. Head: Atraumatic , Normocephalic   Eyes: PERRL. No sclera icterus. No conjunctival injection. No discharge   ENT: No nasal  discharge. Pharynx clear. Neck:  Trachea midline. No thyromegaly, no JVD, No cervical adenopathy. Chest : Bilaterally symmetrical ,Normal effort,  No accessory muscle use  Lung : .Fair BS bilateral, decreased BS at bases. No Rales. No wheezing. Bilateral scattered rhonchi. Heart[de-identified] Normal  rate. Regular rhythm. No mumur ,  Rub or gallop  ABD: Non-tender. Non-distended. No masses. No organmegaly. Normal bowel sounds. No hernia. Ext : Trace pitting both leg , No Cyanosis No clubbing  Neuro: no focal weakness          DATA:   Recent Labs     03/12/23  0604 03/13/23  0502   WBC 7.2 10.2   HGB 8.9* 9.1*   HCT 27.4* 28.5*   MCV 88.9 87.9    235     Recent Labs     03/12/23  0604 03/13/23  0502    143   K 3.6 3.3*    102   CO2 35* 34*   BUN 30* 30*   CREATININE 0.73 0.77   GLUCOSE 129* 94   CALCIUM 9.0 9.0   LABGLOM >60.0 >60.0       MV Settings:     FiO2 : 50 %    No results for input(s): PHART, TAM5MGT, PO2ART, DTW7YBE, BEART, B7ZDOROO in the last 72 hours. O2 Device: Nasal cannula  O2 Flow Rate (L/min): 3 L/min    ADULT DIET;  Dysphagia - Soft and Bite Sized MEDICATIONS during current hospitalization:    Continuous Infusions:   sodium chloride         Scheduled Meds:   vancomycin (VANCOCIN) intermittent dosing (placeholder)   Other RX Placeholder    vancomycin  1,000 mg IntraVENous Q12H    furosemide  40 mg IntraVENous Once    aspirin  81 mg Oral Daily    atorvastatin  80 mg Oral Nightly    ezetimibe  10 mg Oral Nightly    finasteride  5 mg Oral Daily    melatonin  3 mg Oral Nightly    methocarbamol  750 mg Oral TID    pantoprazole  40 mg Oral Daily    sertraline  75 mg Oral Daily    tamsulosin  0.4 mg Oral Nightly    clonazePAM  0.25 mg Oral Q12H    sodium chloride  1,000 mL IntraVENous Once    sodium chloride flush  5-40 mL IntraVENous 2 times per day    guaiFENesin  600 mg Oral BID    albuterol sulfate HFA  2 puff Inhalation 4x daily       PRN Meds:hydrOXYzine HCl, albuterol sulfate HFA, trimethobenzamide, sodium chloride flush, sodium chloride, polyethylene glycol, acetaminophen **OR** acetaminophen, benzonatate, guaiFENesin-dextromethorphan    Radiology  CTA CHEST W WO CONTRAST PE Eval    Result Date: 3/8/2023  EXAMINATION: CTA OF THE CHEST WITH AND WITHOUT CONTRAST 3/8/2023 5:33 pm TECHNIQUE: CTA of the chest was performed before and after the administration of intravenous contrast.  Multiplanar reformatted images are provided for review. MIP images are provided for review. Automated exposure control, iterative reconstruction, and/or weight based adjustment of the mA/kV was utilized to reduce the radiation dose to as low as reasonably achievable. COMPARISON: None. HISTORY: ORDERING SYSTEM PROVIDED HISTORY: PE TECHNOLOGIST PROVIDED HISTORY: Reason for exam:->PE Decision Support Exception - unselect if not a suspected or confirmed emergency medical condition->Emergency Medical Condition (MA) What reading provider will be dictating this exam?->CRC FINDINGS: Pulmonary Arteries: Pulmonary arteries are adequately opacified for evaluation.   No evidence of intraluminal filling defect to suggest pulmonary embolism. Main pulmonary artery is mildly prominent. Pulmonary arterial hypertension cannot be excluded. . Mediastinum: No evidence of mediastinal lymphadenopathy. The heart and pericardium demonstrate no acute abnormality. There is no acute abnormality of the thoracic aorta. There is mild aneurysmal dilatation of the ascending aorta which measures 4 cm. Lungs/pleura: 5 mm right upper lobe pulmonary nodule is noted (axial image 22). Atelectasis versus scarring is noted in the right lower lobe. There is right pleural calcification and pleural thickening which may be due to asbestos related pleural disease. There is volume loss in the left lung with mediastinal shift to the left. Consolidated left lung may reflect atelectasis or chronic scarring. There is pleural calcification and thickening in the left hemithorax which may rib reflect asbestos related pleural disease or chronic changes from remote hematoma or granulomatous disease such as tuberculosis. Upper Abdomen: Low-density lesions in the right kidney are likely cysts. 9 mm calcification in the left kidney and 1 cm calcification in the right kidney are consistent with renal calculi. Small calcified gallstones are noted. Soft Tissues/Bones: Post sternotomy changes are noted. Chronic T12 compression deformity is noted. There is fusion hardware in the visualized upper lumbar spine. 1. Negative study for pulmonary embolism. 2. Chronic scarring versus atelectasis in the left lung. 3. Bilateral pleural calcifications which may be due to asbestos related pleural disease, chronic hematomas or granulomatous disease. 4. Prominence of the main pulmonary arteries which may indicate pulmonary arterial hypertension. 5. Bilateral nephrolithiasis. 6. Cholelithiasis. 7. 5 mm right upper lobe pulmonary nodule.  RECOMMENDATIONS: Fleischner Society guidelines for follow-up and management of incidentally detected pulmonary nodules: Single Solid Nodule: Nodule size less than 6 mm In a low-risk patient, no routine follow-up. In a high-risk patient, optional CT at 12 months. - Low risk patients include individuals with minimal or absent history of smoking and other known risk factors. - High risk patients include individuals with a history or smoking or known risk factors. Radiology 2017 http://pubs. rsna.org/doi/full/10.1148/radiol. 1513345821     XR CHEST PORTABLE    Result Date: 3/12/2023  EXAMINATION: ONE XRAY VIEW OF THE CHEST 3/12/2023 2:52 pm COMPARISON: Chest series from March 11, 2023 HISTORY: ORDERING SYSTEM PROVIDED HISTORY: pneumonia TECHNOLOGIST PROVIDED HISTORY: Reason for exam:->pneumonia What reading provider will be dictating this exam?->CRC FINDINGS: Left anterior chest wall cardiac support device with stable position of distal leads. Midline sternotomy hardware and postsurgical changes consistent with prior CABG. Valve prosthesis. There appears to be mild bilateral lung hyperinflation and background coarsened interstitial markings. Persistent dense retrocardiac and left basilar opacities. Faint bilateral interstitial opacities. Possible left pleural effusion. No obvious pneumothorax. Osseous and thoracic soft tissue structures demonstrate no acute findings. Osseous mineralization is decreased. 1.  Stable chest series. No change in dense retrocardiac and left basilar opacities. No change in possible left pleural effusion nor the faint interstitial opacities. 2.  Background coarsened interstitial markings. Atherosclerotic disease, cardiomegaly, and postsurgical changes. XR CHEST PORTABLE    Result Date: 3/11/2023  EXAMINATION: ONE XRAY VIEW OF THE CHEST 3/11/2023 9:14 am COMPARISON: 03/10/2023, CT 03/08/2023 HISTORY: ORDERING SYSTEM PROVIDED HISTORY: hypoxia TECHNOLOGIST PROVIDED HISTORY: Reason for exam:->hypoxia What reading provider will be dictating this exam?->CRC FINDINGS: Left-sided cardiac pacemaker.   Median sternotomy and cardiac valve. Previous spine surgery. Left greater than right pleural effusions. Diffuse interstitial prominence suggesting component of pulmonary edema or atypical infection in the acute setting. Hazy right lung base opacity likely atelectasis or scar. Dense opacity in the left lung base. Improved aeration of the left upper and mid lung zone with some residual strandy opacity. Cardiomegaly. Atherosclerotic vascular calcifications. Dense opacity in the left lung base likely atelectasis or pneumonia. Diffuse interstitial prominence suggestive of atypical infection or edema possibly superimposed on chronic lung changes. Improved aeration of the left mid and upper lung zone with residual strandy opacity in this region likely representing atelectasis/scar or inflammatory disease. Left greater than right pleural effusions. Cardiomegaly. XR CHEST PORTABLE    Result Date: 3/10/2023  EXAMINATION: ONE XRAY VIEW OF THE CHEST 3/10/2023 5:39 am COMPARISON: March 8, 2023 HISTORY: ORDERING SYSTEM PROVIDED HISTORY: hypoxia, sob TECHNOLOGIST PROVIDED HISTORY: Reason for exam:->hypoxia, sob What reading provider will be dictating this exam?->CRC FINDINGS: Unchanged complete white out left hemithorax with leftward shift of the cardio mediastinum. Pacemaker, prosthetic valve and sternal wires noted. Right lung remains clear. No pneumothorax or effusion. 1. No acute disease. No change from March 8, 2023. RECOMMENDATION: Careful clinical correlation and follow up recommended. XR CHEST PORTABLE    Result Date: 3/8/2023  EXAMINATION: ONE XRAY VIEW OF THE CHEST 3/8/2023 4:47 pm COMPARISON: None. HISTORY: ORDERING SYSTEM PROVIDED HISTORY: sob TECHNOLOGIST PROVIDED HISTORY: Reason for exam:->sob What reading provider will be dictating this exam?->CRC FINDINGS: The lungs are without acute focal process. Small right pleural effusion. Probable left pleural effusion. No pneumothorax. Cardiomegaly. Left-sided transvenous pacer device, prosthetic heart valve and sternotomy wires noted. The osseous structures are without acute process. Right pleural effusion. Probable left pleural effusion. Cardiomegaly. Bronchoscopy    Result Date: 3/10/2023  [facility] Patient: Bria Marcano MRN: [id number] : 1945 Account: [de-identified] Gender: Male Age: 76 Years Procedure: Bronchoscopy Date: 3/10/2023 Attending Physician: Myrtha Denver Indications:        -  Atelectasis of the left lower lobe Complications:        -  No immediate complications Estimated Blood Loss:        -  Estimated blood loss was minimal. Procedure:        - The scope was introduced through the mouth, via the endotracheal tube (the           patient was intubated for the procedure) and advanced to tracheobronchial tree. -  The patient tolerated the procedure well. -  The procedure was accomplished without difficulty. Findings:        - see note in epic please Impression:        -  Atelectasis of the left lower lobe        - see note in epic please        -  No specimens collected. Procedure Code(s):        - [id number], Bronchoscopy, rigid or flexible, including fluoroscopic guidance,           when performed; diagnostic, with cell washing, when performed (separate           procedure) Diagnosis Code(s):        - J98.11, Atelectasis       CPT(R) - 2022 copyright [employer]. All Rights Reserved. The CPT codes, CCI edits and ICD codes generated are intended as suggestions       and were generated based on input data. These codes are preliminary and upon        review may be revised to meet current compliance and payer requirements. The provider is responsible for the final determination of appropriate codes,       and modifiers. Scope Withdrawal Time:       00:00:02 SANTOS LEW This document has been electronically signed.  Note Initiated:3/10/2023 Note Completed:3/10/2023 9:29 AM        IMPRESSION AND SUGGESTION:  Acute on chronic respiratory failure with hypoxia  Left lung collapse status post bronchoscopy with aspiration of tracheobronchial secretion. Staph aureus pneumonia  COVID-19 infection  Rule out like likely due to asbestos exposure  Bilateral pleural effusion  5 mm lung nodule    Continue O2 to keep saturation 90% or above. Continue droplet plus isolation. Aspiration precaution. Chest x-ray noted. Aspiration precaution. Bronchodilator therapy continue guaifenesin. Chest x-ray result noted. Strict input output measurement. Continue present treatment plan   NOTE: This report was transcribed using voice recognition software. Every effort was made to ensure accuracy; however, inadvertent computerized transcription errors may be present.       Electronically signed by Jethro Gresham MD, FCCP on 3/13/2023 at 12:54 PM

## 2023-03-13 NOTE — PROGRESS NOTES
Progress Note      Patient Name: Kobe Ames Date: 3/8/2023  3:27 PM  MR #: [id number]  : 1945    Attending Physician: Dianna Boykin MD  Reason for consult: Elevated BNP and Troponin    History of Presenting Illness:      Bright Botello is a 76 y.o. male on hospital day 5 with a history of . History Obtained From:  patient, electronic medical record    Admitted w respiratory failure. Dx w COVID 6 days ago. Pt is coughing up very thick yellow sputum. He has significant cough. He is SOB. He has been at [facility] and transferred for worse respiratory distress. 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    He had CABG. 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 Was on Warfarin but due to inability to maintain therapeutic INR was change to Eliquis. He sees [facility]. He has a PPM for CHB. He has Mild LE Edema and Lung sounds are course and diminished. 3/10/23  no fever. Continues to cough up thick yellow sputum. Having coughing fits. On 4L O2 sats 92-99%   Hypotensive early in am.     3/13/23 Had Hemoptysis. Hb 9.1 and stable No Fever on 3L O2. Still wet course cough. C/o Panic attack but appears comfortable to me. History:      EKG:  V Paced 68 w underlying SR  Past Medical History:   Diagnosis Date    Atrial fibrillation ([facility] [address]     Benign prostate hyperplasia     Hyperlipemia     Kidney disease     Prostate cancer ([facility] [address]     Sleep apnea     Stroke [facility])     Urinary retention      Past Surgical History:   Procedure Laterality Date    PACEMAKER PLACEMENT N/A        Family History  History reviewed. No pertinent family history.   [] Unable to obtain due to ventilated and/ or neurologic status    Social History     Socioeconomic History    Marital status:      Spouse name: Not on file    Number of children: Not on file    Years of education: Not on file    Highest education level: Not on file   Occupational History    Not on file   Tobacco Use    Smoking status: Never Smokeless tobacco: Never   Substance and Sexual Activity    Alcohol use: Never    Drug use: Never    Sexual activity: Not on file   Other Topics Concern    Not on file   Social History Narrative    Not on file     Social Determinants of Health     Financial Resource Strain: Not on file   Food Insecurity: Not on file   Transportation Needs: Not on file   Physical Activity: Not on file   Stress: Not on file   Social Connections: Not on file   Intimate Partner Violence: Not on file   Housing Stability: Not on file      [] Unable to obtain due to ventilated and/ or neurologic status      Home Medications:      Medications Prior to Admission: Aspirin 81 MG CAPS, Take 81 mg by mouth daily  fenofibrate (TRICOR) 145 MG tablet, Take 145 mg by mouth  ferrous sulfate 324 (65 Fe) MG EC tablet, Take 324 mg by mouth  gabapentin (NEURONTIN) 600 MG tablet, Take 600 mg by mouth in the morning, at noon, and at bedtime. magnesium hydroxide (MILK OF MAGNESIA) 400 MG/5ML suspension, Take 30 mLs by mouth daily  Pantoprazole Sodium (PROTONIX PO), Take 40 mg by mouth daily  sertraline (ZOLOFT) 25 MG tablet, Take 75 mg by mouth daily  Vitamin D, Ergocalciferol, 69592 units CAPS, Take 1 tablet by mouth daily  acetaminophen (TYLENOL) 500 MG tablet, Take 2 tablets by mouth every 8 hours as needed  atorvastatin (LIPITOR) 40 MG tablet, Take 1 tablet by mouth daily  enoxaparin (LOVENOX) 40 MG/0.4ML, Inject 40 mg into the skin daily  ezetimibe (ZETIA) 10 MG tablet, Take 10 mg by mouth at bedtime  finasteride (PROSCAR) 5 MG tablet, Take 5 mg by mouth daily  guaiFENesin (MUCINEX) 600 MG extended release tablet, Take 1 tablet by mouth in the morning and at bedtime  HYDROcodone-acetaminophen (NORCO) 5-325 MG per tablet, Take 1 tablet by mouth every 6 hours as needed.   ipratropium-albuterol (DUONEB) 0.5-2.5 (3) MG/3ML SOLN nebulizer solution, Inhale 1 vial into the lungs 4 times daily  levoFLOXacin (LEVAQUIN) 750 MG tablet, Take 1 tablet by mouth daily  melatonin 3 MG TABS tablet, Take 1 tablet by mouth at bedtime  mirabegron (MYRBETRIQ) 50 MG TB24, Take 50 mg by mouth daily  predniSONE (DELTASONE) 20 MG tablet, Take 40 mg by mouth daily  tamsulosin (FLOMAX) 0.4 MG capsule, Take 0.4 mg by mouth at bedtime  methocarbamol (ROBAXIN) 750 MG tablet, Take 750 mg by mouth 3 times daily  furosemide (LASIX) 40 MG tablet, Take 40 mg by mouth daily  tadalafil (CIALIS) 5 MG tablet, Take 5 mg by mouth daily  [DISCONTINUED] Methocarbamol 1000 MG TABS, Take 750 mg by mouth 3 times daily    Current Hospital Medications:     Scheduled Meds:   vancomycin (VANCOCIN) intermittent dosing (placeholder)   Other RX Placeholder    vancomycin  1,000 mg IntraVENous Q12H    furosemide  40 mg IntraVENous Once    aspirin  81 mg Oral Daily    atorvastatin  80 mg Oral Nightly    ezetimibe  10 mg Oral Nightly    finasteride  5 mg Oral Daily    melatonin  3 mg Oral Nightly    methocarbamol  750 mg Oral TID    pantoprazole  40 mg Oral Daily    sertraline  75 mg Oral Daily    tamsulosin  0.4 mg Oral Nightly    clonazePAM  0.25 mg Oral Q12H    sodium chloride  1,000 mL IntraVENous Once    sodium chloride flush  5-40 mL IntraVENous 2 times per day    guaiFENesin  600 mg Oral BID    albuterol sulfate HFA  2 puff Inhalation 4x daily     Continuous Infusions:   sodium chloride       PRN Meds:.hydrOXYzine HCl, albuterol sulfate HFA, trimethobenzamide, sodium chloride flush, sodium chloride, polyethylene glycol, acetaminophen **OR** acetaminophen, benzonatate, guaiFENesin-dextromethorphan  .   sodium chloride          Allergies: Allergies   Allergen Reactions    Penicillins     Sulfamethoxazole Hives    Tapentadol Hives    Trimethoprim Hives        Review of Systems:       Review of Systems   Constitutional:  Positive for fatigue. Negative for diaphoresis. HENT: Negative. Eyes: Negative. Respiratory:  Positive for cough and shortness of breath.  Negative for chest tightness, wheezing and stridor. Cardiovascular: Negative. Negative for chest pain, palpitations and leg swelling. Gastrointestinal: Negative. Negative for blood in stool and nausea. Genitourinary: Negative. Musculoskeletal: Negative. Skin: Negative. Neurological:  Positive for weakness. Negative for dizziness, syncope and light-headedness. Hematological: Negative. Psychiatric/Behavioral: Negative. Objective Findings:     Vitals:/68   Pulse 74   Temp 98.1 °F (36.7 °C) (Oral)   Resp 18   Ht 5' 11\" (1.803 m)   Wt 180 lb (81.6 kg)   SpO2 97%   BMI 25.10 kg/m²      Physical Examination:    Physical Exam   Constitutional: No distress. He appears chronically ill and acutely ill. HENT:   Normal cephalic and Atraumatic   Eyes: Pupils are equal, round, and reactive to light. Neck: Thyroid normal. No JVD present. No neck adenopathy. No thyromegaly present. Cardiovascular: Regular rhythm, intact distal pulses and normal pulses. Tachycardia present. Murmur heard. Pulmonary/Chest: Effort normal. He has scattered wheezes. He exhibits no tenderness. Course BS   Abdominal: Soft. Bowel sounds are normal. There is no abdominal tenderness. Musculoskeletal:         General: Edema (trace) present. No tenderness. Normal range of motion. Cervical back: Normal range of motion and neck supple. Neurological: He is alert and oriented to person, place, and time. Skin: Skin is warm. No cyanosis. Nails show no clubbing.        Results/ Medications reviewed 3/13/2023, 10:12 AM     Laboratory, Microbiology, Pathology, Radiology, Cardiology, Medications and Transcriptions reviewed  Scheduled Meds:   vancomycin (VANCOCIN) intermittent dosing (placeholder)   Other RX Placeholder    vancomycin  1,000 mg IntraVENous Q12H    furosemide  40 mg IntraVENous Once    aspirin  81 mg Oral Daily    atorvastatin  80 mg Oral Nightly    ezetimibe  10 mg Oral Nightly    finasteride  5 mg Oral Daily    melatonin  3 mg Oral Nightly    methocarbamol  750 mg Oral TID    pantoprazole  40 mg Oral Daily    sertraline  75 mg Oral Daily    tamsulosin  0.4 mg Oral Nightly    clonazePAM  0.25 mg Oral Q12H    sodium chloride  1,000 mL IntraVENous Once    sodium chloride flush  5-40 mL IntraVENous 2 times per day    guaiFENesin  600 mg Oral BID    albuterol sulfate HFA  2 puff Inhalation 4x daily     Continuous Infusions:   sodium chloride         Recent Labs     03/11/23 0447 03/12/23  0604 03/13/23  0502   WBC 10.5 7.2 10.2   HGB 8.5* 8.9* 9.1*   HCT 25.9* 27.4* 28.5*   MCV 89.2 88.9 87.9    201 235       Recent Labs     03/11/23 0447 03/12/23 0604 03/13/23  0502    143 143   K 3.5 3.6 3.3*    102 102   CO2 34* 35* 34*   BUN 35* 30* 30*   CREATININE 0.91 0.73 0.77       No results for input(s): AST, ALT, ALB, BILIDIR, BILITOT, ALKPHOS in the last 72 hours. No results for input(s): LIPASE, AMYLASE in the last 72 hours. No results for input(s): PROT, INR in the last 72 hours. CTA CHEST W WO CONTRAST PE Eval    Result Date: 3/8/2023  EXAMINATION: CTA OF THE CHEST WITH AND WITHOUT CONTRAST 3/8/2023 5:33 pm TECHNIQUE: CTA of the chest was performed before and after the administration of intravenous contrast.  Multiplanar reformatted images are provided for review. MIP images are provided for review. Automated exposure control, iterative reconstruction, and/or weight based adjustment of the mA/kV was utilized to reduce the radiation dose to as low as reasonably achievable. COMPARISON: None. HISTORY: ORDERING SYSTEM PROVIDED HISTORY: PE TECHNOLOGIST PROVIDED HISTORY: Reason for exam:->PE Decision Support Exception - unselect if not a suspected or confirmed emergency medical condition->Emergency Medical Condition (MA) What reading provider will be dictating this exam?->CRC FINDINGS: Pulmonary Arteries: Pulmonary arteries are adequately opacified for evaluation.   No evidence of intraluminal filling defect to suggest pulmonary embolism. Main pulmonary artery is mildly prominent. Pulmonary arterial hypertension cannot be excluded. . Mediastinum: No evidence of mediastinal lymphadenopathy. The heart and pericardium demonstrate no acute abnormality. There is no acute abnormality of the thoracic aorta. There is mild aneurysmal dilatation of the ascending aorta which measures 4 cm. Lungs/pleura: 5 mm right upper lobe pulmonary nodule is noted (axial image 22). Atelectasis versus scarring is noted in the right lower lobe. There is right pleural calcification and pleural thickening which may be due to asbestos related pleural disease. There is volume loss in the left lung with mediastinal shift to the left. Consolidated left lung may reflect atelectasis or chronic scarring. There is pleural calcification and thickening in the left hemithorax which may rib reflect asbestos related pleural disease or chronic changes from remote hematoma or granulomatous disease such as tuberculosis. Upper Abdomen: Low-density lesions in the right kidney are likely cysts. 9 mm calcification in the left kidney and 1 cm calcification in the right kidney are consistent with renal calculi. Small calcified gallstones are noted. Soft Tissues/Bones: Post sternotomy changes are noted. Chronic T12 compression deformity is noted. There is fusion hardware in the visualized upper lumbar spine. 1. Negative study for pulmonary embolism. 2. Chronic scarring versus atelectasis in the left lung. 3. Bilateral pleural calcifications which may be due to asbestos related pleural disease, chronic hematomas or granulomatous disease. 4. Prominence of the main pulmonary arteries which may indicate pulmonary arterial hypertension. 5. Bilateral nephrolithiasis. 6. Cholelithiasis. 7. 5 mm right upper lobe pulmonary nodule.  RECOMMENDATIONS: Fleischner Society guidelines for follow-up and management of incidentally detected pulmonary nodules: Single Solid Nodule: Nodule size less than 6 mm In a low-risk patient, no routine follow-up. In a high-risk patient, optional CT at 12 months. - Low risk patients include individuals with minimal or absent history of smoking and other known risk factors. - High risk patients include individuals with a history or smoking or known risk factors. Radiology 2017 http://pubs. rsna.org/doi/full/10.1148/radiol. 1763052192     XR CHEST PORTABLE    Result Date: 3/8/2023  EXAMINATION: ONE XRAY VIEW OF THE CHEST 3/8/2023 4:47 pm COMPARISON: None. HISTORY: ORDERING SYSTEM PROVIDED HISTORY: sob TECHNOLOGIST PROVIDED HISTORY: Reason for exam:->sob What reading provider will be dictating this exam?->CRC FINDINGS: The lungs are without acute focal process. Small right pleural effusion. Probable left pleural effusion. No pneumothorax. Cardiomegaly. Left-sided transvenous pacer device, prosthetic heart valve and sternotomy wires noted. The osseous structures are without acute process. Right pleural effusion. Probable left pleural effusion. Cardiomegaly. Active Hospital Problems    Diagnosis Date Noted    Hypoxia [R09.02] 03/09/2023     Priority: Medium    Acute respiratory failure with hypoxia (HCC) [J96.01] 03/08/2023     Priority: Medium         Impression/Plan:   COVID PNA - significant cough and  sputum - S/P Bronch and BAL. PAF - in SR today. A/C on hold due to Hemoptysis. Appears stable. Will need to resume A/C when stable  BIO MVR and AVR post IE   CABG- add ASA and statin. BB when BP mores stable. HPL Statin low fat diet  HTN Stable   PPM CHB   C/o Panic Attack      Thank you for allowing us to participate in the care of this patient. Will continue to follow. Please call if questions or concerns arise.     Electronically signed by Nayeli Rizo MD on 3/13/2023 at 10:12 AM

## 2023-03-14 LAB
ANION GAP SERPL CALCULATED.3IONS-SCNC: 6 MEQ/L (ref 9–15)
BASOPHILS ABSOLUTE: 0 K/UL (ref 0–0.2)
BASOPHILS RELATIVE PERCENT: 0.3 %
BUN BLDV-MCNC: 23 MG/DL (ref 8–23)
CALCIUM SERPL-MCNC: 8.8 MG/DL (ref 8.5–9.9)
CHLORIDE BLD-SCNC: 106 MEQ/L (ref 95–107)
CO2: 32 MEQ/L (ref 20–31)
CREAT SERPL-MCNC: 0.67 MG/DL (ref 0.7–1.2)
EOSINOPHILS ABSOLUTE: 0.2 K/UL (ref 0–0.7)
EOSINOPHILS RELATIVE PERCENT: 1.8 %
GFR SERPL CREATININE-BSD FRML MDRD: >60 ML/MIN/{1.73_M2}
GLUCOSE BLD-MCNC: 96 MG/DL (ref 70–99)
HCT VFR BLD CALC: 28.2 % (ref 42–52)
HEMOGLOBIN: 9.1 G/DL (ref 14–18)
LYMPHOCYTES ABSOLUTE: 1 K/UL (ref 1–4.8)
LYMPHOCYTES RELATIVE PERCENT: 10.5 %
MAGNESIUM: 1.7 MG/DL (ref 1.7–2.4)
MCH RBC QN AUTO: 29.3 PG (ref 27–31.3)
MCHC RBC AUTO-ENTMCNC: 32.1 % (ref 33–37)
MCV RBC AUTO: 91.4 FL (ref 79–92.2)
MONOCYTES ABSOLUTE: 0.8 K/UL (ref 0.2–0.8)
MONOCYTES RELATIVE PERCENT: 9.2 %
NEUTROPHILS ABSOLUTE: 7.2 K/UL (ref 1.4–6.5)
NEUTROPHILS RELATIVE PERCENT: 78.2 %
PDW BLD-RTO: 20.9 % (ref 11.5–14.5)
PLATELET # BLD: 203 K/UL (ref 130–400)
POTASSIUM REFLEX MAGNESIUM: 3.1 MEQ/L (ref 3.4–4.9)
PRELIMINARY: NORMAL
RBC # BLD: 3.09 M/UL (ref 4.7–6.1)
REASON FOR REJECTION: NORMAL
REJECTED TEST: NORMAL
SODIUM BLD-SCNC: 144 MEQ/L (ref 135–144)
VANCOMYCIN RANDOM: 30 UG/ML (ref 10–40)
WBC # BLD: 9.2 K/UL (ref 4.8–10.8)

## 2023-03-14 PROCEDURE — 80202 ASSAY OF VANCOMYCIN: CPT

## 2023-03-14 PROCEDURE — 2700000000 HC OXYGEN THERAPY PER DAY

## 2023-03-14 PROCEDURE — 83735 ASSAY OF MAGNESIUM: CPT

## 2023-03-14 PROCEDURE — 6370000000 HC RX 637 (ALT 250 FOR IP): Performed by: INTERNAL MEDICINE

## 2023-03-14 PROCEDURE — 94761 N-INVAS EAR/PLS OXIMETRY MLT: CPT

## 2023-03-14 PROCEDURE — 6360000002 HC RX W HCPCS: Performed by: INTERNAL MEDICINE

## 2023-03-14 PROCEDURE — 85025 COMPLETE CBC W/AUTO DIFF WBC: CPT

## 2023-03-14 PROCEDURE — 97162 PT EVAL MOD COMPLEX 30 MIN: CPT

## 2023-03-14 PROCEDURE — 2580000003 HC RX 258: Performed by: INTERNAL MEDICINE

## 2023-03-14 PROCEDURE — 36415 COLL VENOUS BLD VENIPUNCTURE: CPT

## 2023-03-14 PROCEDURE — 97166 OT EVAL MOD COMPLEX 45 MIN: CPT

## 2023-03-14 PROCEDURE — 1210000000 HC MED SURG R&B

## 2023-03-14 PROCEDURE — 94640 AIRWAY INHALATION TREATMENT: CPT

## 2023-03-14 PROCEDURE — 80048 BASIC METABOLIC PNL TOTAL CA: CPT

## 2023-03-14 PROCEDURE — 99232 SBSQ HOSP IP/OBS MODERATE 35: CPT | Performed by: INTERNAL MEDICINE

## 2023-03-14 PROCEDURE — 99233 SBSQ HOSP IP/OBS HIGH 50: CPT | Performed by: INTERNAL MEDICINE

## 2023-03-14 RX ADMIN — ATORVASTATIN CALCIUM 80 MG: 80 TABLET, FILM COATED ORAL at 21:37

## 2023-03-14 RX ADMIN — VANCOMYCIN HYDROCHLORIDE 1000 MG: 1 INJECTION, POWDER, LYOPHILIZED, FOR SOLUTION INTRAVENOUS at 05:19

## 2023-03-14 RX ADMIN — VANCOMYCIN HYDROCHLORIDE 1000 MG: 1 INJECTION, POWDER, LYOPHILIZED, FOR SOLUTION INTRAVENOUS at 16:28

## 2023-03-14 RX ADMIN — SERTRALINE HYDROCHLORIDE 75 MG: 50 TABLET ORAL at 08:44

## 2023-03-14 RX ADMIN — GUAIFENESIN 600 MG: 600 TABLET, EXTENDED RELEASE ORAL at 21:36

## 2023-03-14 RX ADMIN — HYDROXYZINE HYDROCHLORIDE 25 MG: 25 TABLET ORAL at 21:37

## 2023-03-14 RX ADMIN — GUAIFENESIN 600 MG: 600 TABLET, EXTENDED RELEASE ORAL at 08:44

## 2023-03-14 RX ADMIN — PANTOPRAZOLE SODIUM 40 MG: 40 TABLET, DELAYED RELEASE ORAL at 08:46

## 2023-03-14 RX ADMIN — METHOCARBAMOL 750 MG: 500 TABLET ORAL at 08:45

## 2023-03-14 RX ADMIN — ALBUTEROL SULFATE 2 PUFF: 90 AEROSOL, METERED RESPIRATORY (INHALATION) at 15:22

## 2023-03-14 RX ADMIN — TAMSULOSIN HYDROCHLORIDE 0.4 MG: 0.4 CAPSULE ORAL at 21:36

## 2023-03-14 RX ADMIN — ALBUTEROL SULFATE 2 PUFF: 90 AEROSOL, METERED RESPIRATORY (INHALATION) at 07:17

## 2023-03-14 RX ADMIN — ALBUTEROL SULFATE 2 PUFF: 90 AEROSOL, METERED RESPIRATORY (INHALATION) at 20:38

## 2023-03-14 RX ADMIN — Medication 10 ML: at 08:46

## 2023-03-14 RX ADMIN — Medication 3 MG: at 21:36

## 2023-03-14 RX ADMIN — Medication 10 ML: at 21:38

## 2023-03-14 RX ADMIN — FINASTERIDE 5 MG: 5 TABLET, FILM COATED ORAL at 08:44

## 2023-03-14 RX ADMIN — BENZONATATE 100 MG: 100 CAPSULE ORAL at 21:36

## 2023-03-14 RX ADMIN — ASPIRIN 81 MG: 81 TABLET, COATED ORAL at 08:44

## 2023-03-14 RX ADMIN — CLONAZEPAM 0.25 MG: 0.5 TABLET ORAL at 02:02

## 2023-03-14 RX ADMIN — ALBUTEROL SULFATE 2 PUFF: 90 AEROSOL, METERED RESPIRATORY (INHALATION) at 10:49

## 2023-03-14 RX ADMIN — METHOCARBAMOL 750 MG: 500 TABLET ORAL at 21:36

## 2023-03-14 RX ADMIN — EZETIMIBE 10 MG: 10 TABLET ORAL at 21:36

## 2023-03-14 RX ADMIN — SODIUM CHLORIDE, PRESERVATIVE FREE 10 ML: 5 INJECTION INTRAVENOUS at 05:20

## 2023-03-14 ASSESSMENT — ENCOUNTER SYMPTOMS
GASTROINTESTINAL NEGATIVE: 1
EYES NEGATIVE: 1
CHEST TIGHTNESS: 0
COUGH: 1
WHEEZING: 0
STRIDOR: 0
BLOOD IN STOOL: 0
SHORTNESS OF BREATH: 1
NAUSEA: 0

## 2023-03-14 ASSESSMENT — PAIN SCALES - GENERAL
PAINLEVEL_OUTOF10: 0

## 2023-03-14 ASSESSMENT — PAIN DESCRIPTION - LOCATION: LOCATION: OTHER (COMMENT)

## 2023-03-14 ASSESSMENT — PAIN DESCRIPTION - DESCRIPTORS: DESCRIPTORS: OTHER (COMMENT)

## 2023-03-14 NOTE — PROGRESS NOTES
INPATIENT PROGRESS NOTES    PATIENT NAME: Jacob Buckner  MRN: [id number]  SERVICE DATE:  March 14, 2023   SERVICE TIME:  8:49 AM      PRIMARY SERVICE: Pulmonary Disease    CHIEF COMPLAIN: Shortness of breath      INTERVAL HPI: Patient seen and examined at bedside, Interval Notes, orders reviewed. Nursing notes noted  He is feeling much better today. He is on 3 L O2 via nasal cannula O2 saturation 97%. He denies having short of breath. No chest pain. He has moist cough. No fever or chills. No nausea vomiting or diarrhea. OBJECTIVE    Body mass index is 25.1 kg/m². PHYSICAL EXAM:  Vitals:  BP (!) 104/57   Pulse 80   Temp 98.1 °F (36.7 °C) (Oral)   Resp 18   Ht 5' 11\" (1.803 m)   Wt 180 lb (81.6 kg)   SpO2 97%   BMI 25.10 kg/m²   General: Alert, awake . comfortable in bed, No distress. Head: Atraumatic , Normocephalic   Eyes: PERRL. No sclera icterus. No conjunctival injection. No discharge   ENT: No nasal  discharge. Pharynx clear. Neck:  Trachea midline. No thyromegaly, no JVD, No cervical adenopathy. Chest : Bilaterally symmetrical ,Normal effort,  No accessory muscle use  Lung : .Fair BS bilateral, decreased BS at bases. No Rales. No wheezing. Bilateral scattered rhonchi. Heart[de-identified] Normal  rate. Regular rhythm. No mumur ,  Rub or gallop  ABD: Non-tender. Non-distended. No masses. No organmegaly. Normal bowel sounds. No hernia. Ext : Trace pitting both leg , No Cyanosis No clubbing  Neuro: no focal weakness          DATA:   Recent Labs     03/13/23  0502 03/14/23  0647   WBC 10.2 9.2   HGB 9.1* 9.1*   HCT 28.5* 28.2*   MCV 87.9 91.4    203     Recent Labs     03/12/23  0604 03/13/23  0502    143   K 3.6 3.3*    102   CO2 35* 34*   BUN 30* 30*   CREATININE 0.73 0.77   GLUCOSE 129* 94   CALCIUM 9.0 9.0   LABGLOM >60.0 >60.0       MV Settings:     FiO2 : 50 %    No results for input(s): PHART, HYE4ANP, PO2ART, SRY0NBP, BEART, L5LXFHLX in the last 72 hours.     O2 Device: Nasal cannula  O2 Flow Rate (L/min): 3 L/min    ADULT DIET; Dysphagia - Soft and Bite Sized     MEDICATIONS during current hospitalization:    Continuous Infusions:   sodium chloride         Scheduled Meds:   vancomycin (VANCOCIN) intermittent dosing (placeholder)   Other RX Placeholder    vancomycin  1,000 mg IntraVENous Q12H    furosemide  40 mg IntraVENous Once    aspirin  81 mg Oral Daily    atorvastatin  80 mg Oral Nightly    ezetimibe  10 mg Oral Nightly    finasteride  5 mg Oral Daily    melatonin  3 mg Oral Nightly    methocarbamol  750 mg Oral TID    pantoprazole  40 mg Oral Daily    sertraline  75 mg Oral Daily    tamsulosin  0.4 mg Oral Nightly    clonazePAM  0.25 mg Oral Q12H    sodium chloride  1,000 mL IntraVENous Once    sodium chloride flush  5-40 mL IntraVENous 2 times per day    guaiFENesin  600 mg Oral BID    albuterol sulfate HFA  2 puff Inhalation 4x daily       PRN Meds:hydrOXYzine HCl, albuterol sulfate HFA, trimethobenzamide, sodium chloride flush, sodium chloride, polyethylene glycol, acetaminophen **OR** acetaminophen, benzonatate, guaiFENesin-dextromethorphan    Radiology  CTA CHEST W WO CONTRAST PE Eval    Result Date: 3/8/2023  EXAMINATION: CTA OF THE CHEST WITH AND WITHOUT CONTRAST 3/8/2023 5:33 pm TECHNIQUE: CTA of the chest was performed before and after the administration of intravenous contrast.  Multiplanar reformatted images are provided for review. MIP images are provided for review. Automated exposure control, iterative reconstruction, and/or weight based adjustment of the mA/kV was utilized to reduce the radiation dose to as low as reasonably achievable. COMPARISON: None.  HISTORY: ORDERING SYSTEM PROVIDED HISTORY: PE TECHNOLOGIST PROVIDED HISTORY: Reason for exam:->PE Decision Support Exception - unselect if not a suspected or confirmed emergency medical condition->Emergency Medical Condition (MA) What reading provider will be dictating this exam?->CRC FINDINGS: Pulmonary Arteries: Pulmonary arteries are adequately opacified for evaluation. No evidence of intraluminal filling defect to suggest pulmonary embolism. Main pulmonary artery is mildly prominent. Pulmonary arterial hypertension cannot be excluded. . Mediastinum: No evidence of mediastinal lymphadenopathy. The heart and pericardium demonstrate no acute abnormality. There is no acute abnormality of the thoracic aorta. There is mild aneurysmal dilatation of the ascending aorta which measures 4 cm. Lungs/pleura: 5 mm right upper lobe pulmonary nodule is noted (axial image 22). Atelectasis versus scarring is noted in the right lower lobe. There is right pleural calcification and pleural thickening which may be due to asbestos related pleural disease. There is volume loss in the left lung with mediastinal shift to the left. Consolidated left lung may reflect atelectasis or chronic scarring. There is pleural calcification and thickening in the left hemithorax which may rib reflect asbestos related pleural disease or chronic changes from remote hematoma or granulomatous disease such as tuberculosis. Upper Abdomen: Low-density lesions in the right kidney are likely cysts. 9 mm calcification in the left kidney and 1 cm calcification in the right kidney are consistent with renal calculi. Small calcified gallstones are noted. Soft Tissues/Bones: Post sternotomy changes are noted. Chronic T12 compression deformity is noted. There is fusion hardware in the visualized upper lumbar spine. 1. Negative study for pulmonary embolism. 2. Chronic scarring versus atelectasis in the left lung. 3. Bilateral pleural calcifications which may be due to asbestos related pleural disease, chronic hematomas or granulomatous disease. 4. Prominence of the main pulmonary arteries which may indicate pulmonary arterial hypertension. 5. Bilateral nephrolithiasis. 6. Cholelithiasis. 7. 5 mm right upper lobe pulmonary nodule.  RECOMMENDATIONS: [employer] Society guidelines for follow-up and management of incidentally detected pulmonary nodules: Single Solid Nodule: Nodule size less than 6 mm In a low-risk patient, no routine follow-up. In a high-risk patient, optional CT at 12 months. - Low risk patients include individuals with minimal or absent history of smoking and other known risk factors. - High risk patients include individuals with a history or smoking or known risk factors. Radiology 2017 http://pubs. rsna.org/doi/full/10.1148/radiol. 2720569978     XR CHEST PORTABLE    Result Date: 3/12/2023  EXAMINATION: ONE XRAY VIEW OF THE CHEST 3/12/2023 2:52 pm COMPARISON: Chest series from March 11, 2023 HISTORY: ORDERING SYSTEM PROVIDED HISTORY: pneumonia TECHNOLOGIST PROVIDED HISTORY: Reason for exam:->pneumonia What reading provider will be dictating this exam?->CRC FINDINGS: Left anterior chest wall cardiac support device with stable position of distal leads. Midline sternotomy hardware and postsurgical changes consistent with prior CABG. Valve prosthesis. There appears to be mild bilateral lung hyperinflation and background coarsened interstitial markings. Persistent dense retrocardiac and left basilar opacities. Faint bilateral interstitial opacities. Possible left pleural effusion. No obvious pneumothorax. Osseous and thoracic soft tissue structures demonstrate no acute findings. Osseous mineralization is decreased. 1.  Stable chest series. No change in dense retrocardiac and left basilar opacities. No change in possible left pleural effusion nor the faint interstitial opacities. 2.  Background coarsened interstitial markings. Atherosclerotic disease, cardiomegaly, and postsurgical changes.      XR CHEST PORTABLE    Result Date: 3/11/2023  EXAMINATION: ONE XRAY VIEW OF THE CHEST 3/11/2023 9:14 am COMPARISON: 03/10/2023, CT 03/08/2023 HISTORY: ORDERING SYSTEM PROVIDED HISTORY: hypoxia TECHNOLOGIST PROVIDED HISTORY: Reason for exam:->hypoxia What reading provider will be dictating this exam?->CRC FINDINGS: Left-sided cardiac pacemaker. Median sternotomy and cardiac valve. Previous spine surgery. Left greater than right pleural effusions. Diffuse interstitial prominence suggesting component of pulmonary edema or atypical infection in the acute setting. Hazy right lung base opacity likely atelectasis or scar. Dense opacity in the left lung base. Improved aeration of the left upper and mid lung zone with some residual strandy opacity. Cardiomegaly. Atherosclerotic vascular calcifications. Dense opacity in the left lung base likely atelectasis or pneumonia. Diffuse interstitial prominence suggestive of atypical infection or edema possibly superimposed on chronic lung changes. Improved aeration of the left mid and upper lung zone with residual strandy opacity in this region likely representing atelectasis/scar or inflammatory disease. Left greater than right pleural effusions. Cardiomegaly. XR CHEST PORTABLE    Result Date: 3/10/2023  EXAMINATION: ONE XRAY VIEW OF THE CHEST 3/10/2023 5:39 am COMPARISON: March 8, 2023 HISTORY: ORDERING SYSTEM PROVIDED HISTORY: hypoxia, sob TECHNOLOGIST PROVIDED HISTORY: Reason for exam:->hypoxia, sob What reading provider will be dictating this exam?->CRC FINDINGS: Unchanged complete white out left hemithorax with leftward shift of the cardio mediastinum. Pacemaker, prosthetic valve and sternal wires noted. Right lung remains clear. No pneumothorax or effusion. 1. No acute disease. No change from March 8, 2023. RECOMMENDATION: Careful clinical correlation and follow up recommended. XR CHEST PORTABLE    Result Date: 3/8/2023  EXAMINATION: ONE XRAY VIEW OF THE CHEST 3/8/2023 4:47 pm COMPARISON: None. HISTORY: ORDERING SYSTEM PROVIDED HISTORY: sob TECHNOLOGIST PROVIDED HISTORY: Reason for exam:->sob What reading provider will be dictating this exam?->CRC FINDINGS: The lungs are without acute focal process. Small right pleural effusion. Probable left pleural effusion. No pneumothorax. Cardiomegaly. Left-sided transvenous pacer device, prosthetic heart valve and sternotomy wires noted. The osseous structures are without acute process. Right pleural effusion. Probable left pleural effusion. Cardiomegaly. Bronchoscopy    Result Date: 3/10/2023  [facility] Patient: Mina Carrillo MRN: [id number] : 1945 Account: [de-identified] Gender: Male Age: 76 Years Procedure: Bronchoscopy Date: 3/10/2023 Attending Physician: Adin Blum Indications:        -  Atelectasis of the left lower lobe Complications:        -  No immediate complications Estimated Blood Loss:        -  Estimated blood loss was minimal. Procedure:        - The scope was introduced through the mouth, via the endotracheal tube (the           patient was intubated for the procedure) and advanced to tracheobronchial tree. -  The patient tolerated the procedure well. -  The procedure was accomplished without difficulty. Findings:        - see note in epic please Impression:        -  Atelectasis of the left lower lobe        - see note in epic please        -  No specimens collected. Procedure Code(s):        - [id number], Bronchoscopy, rigid or flexible, including fluoroscopic guidance,           when performed; diagnostic, with cell washing, when performed (separate           procedure) Diagnosis Code(s):        - J98.11, Atelectasis       CPT(R) - 2022 copyright American Medical Association. All Rights Reserved. The CPT codes, CCI edits and ICD codes generated are intended as suggestions       and were generated based on input data. These codes are preliminary and upon        review may be revised to meet current compliance and payer requirements. The provider is responsible for the final determination of appropriate codes,       and modifiers.  Scope Withdrawal Time:       00:00:02 SANTOS LEW This document has been electronically signed. Note Initiated:3/10/2023 Note Completed:3/10/2023 9:29 AM        IMPRESSION AND SUGGESTION:  Acute on chronic respiratory failure with hypoxia  Left lung collapse status post bronchoscopy with aspiration of tracheobronchial secretion. Staph aureus pneumonia  COVID-19 infection  Rule out like likely due to asbestos exposure  Bilateral pleural effusion  5 mm lung nodule    Continue O2 to keep saturation 90% or above. Continue  Aspiration precaution. Chest x-ray noted. Aspiration precaution. Bronchodilator therapy continue guaifenesin. Discussed with RN. Continue present treatment plan   NOTE: This report was transcribed using voice recognition software. Every effort was made to ensure accuracy; however, inadvertent computerized transcription errors may be present.       Electronically signed by Ashanti Qureshi MD, FCCP on 3/14/2023 at 8:49 AM

## 2023-03-14 NOTE — PROGRESS NOTES
Physician Progress Note    3/14/2023   4:33 PM    Name:  Chris Sampson  MRN:    [id number]     IP Day: 6     Admit Date: 3/8/2023  3:27 PM  PCP: Yuan Rizo MD    Code Status:  Full Code    Subjective:     He still has a significant cough with shortness of breath.    Current Facility-Administered Medications   Medication Dose Route Frequency Provider Last Rate Last Admin    vancomycin (VANCOCIN) intermittent dosing (placeholder)   Other RX Placeholder Yessi Amaya MD        vancomycin 1000 mg IVPB in 250 mL NS addavial  1,000 mg IntraVENous Q12H Yessi Amaya  mL/hr at 03/14/23 1628 1,000 mg at 03/14/23 1628    hydrOXYzine HCl (ATARAX) tablet 25 mg  25 mg Oral Q6H PRN Peter R Diego, DO   25 mg at 03/13/23 2054    albuterol sulfate HFA (PROVENTIL;VENTOLIN;PROAIR) 108 (90 Base) MCG/ACT inhaler 2 puff  2 puff Inhalation Q2H PRN Peter R Diego, DO        furosemide (LASIX) injection 40 mg  40 mg IntraVENous Once Freddy Vazquez MD        aspirin EC tablet 81 mg  81 mg Oral Daily Freddy Vazquez MD   81 mg at 03/14/23 0844    atorvastatin (LIPITOR) tablet 80 mg  80 mg Oral Nightly Freddy Vazquez MD   80 mg at 03/13/23 2050    ezetimibe (ZETIA) tablet 10 mg  10 mg Oral Nightly Peter R Diego, DO   10 mg at 03/13/23 2050    finasteride (PROSCAR) tablet 5 mg  5 mg Oral Daily Peter R Diego, DO   5 mg at 03/14/23 0844    melatonin tablet 3 mg  3 mg Oral Nightly Peter R Diego, DO   3 mg at 03/13/23 2050    methocarbamol (ROBAXIN) tablet 750 mg  750 mg Oral TID Peter R Diego, DO   750 mg at 03/14/23 0845    pantoprazole (PROTONIX) tablet 40 mg  40 mg Oral Daily Peter R Diego, DO   40 mg at 03/14/23 0846    sertraline (ZOLOFT) tablet 75 mg  75 mg Oral Daily Peter R Diego, DO   75 mg at 03/14/23 0844    tamsulosin (FLOMAX) capsule 0.4 mg  0.4 mg Oral Nightly Peter Cortez DO   0.4 mg at 03/13/23 2051    trimethobenzamide (TIGAN) injection 200 mg  200 mg IntraMUSCular Q6H PRN Peter Cortez DO         clonazePAM (KLONOPIN) tablet 0.25 mg  0.25 mg Oral Q12H John Manning MD   0.25 mg at 23 0202    0.9 % sodium chloride bolus  1,000 mL IntraVENous Once Gillermina Slight, DO   Held at 23 1628    sodium chloride flush 0.9 % injection 5-40 mL  5-40 mL IntraVENous 2 times per day Gillermina Slight, DO   10 mL at 23 0846    sodium chloride flush 0.9 % injection 5-40 mL  5-40 mL IntraVENous PRN Gillermina Slight, DO   10 mL at 23 0520    0.9 % sodium chloride infusion   IntraVENous PRN Gillermina Slight, DO        polyethylene glycol (GLYCOLAX) packet 17 g  17 g Oral Daily PRN Gillermina Slight, DO        acetaminophen (TYLENOL) tablet 650 mg  650 mg Oral Q6H PRN Gillermina Slight, DO        Or    acetaminophen (TYLENOL) suppository 650 mg  650 mg Rectal Q6H PRN Gillermina Slight, DO        guaiFENesin [facility]) extended release tablet 600 mg  600 mg Oral BID Gillermina Slight, DO   600 mg at 23 0844    benzonatate (TESSALON) capsule 100 mg  100 mg Oral TID PRN Gillermina Slight, DO   100 mg at 231    guaiFENesin-dextromethorphan (ROBITUSSIN DM) 100-10 MG/5ML syrup 5 mL  5 mL Oral Q4H PRN Gillermina Slight, DO        albuterol sulfate HFA (PROVENTIL;VENTOLIN;PROAIR) 108 (90 Base) MCG/ACT inhaler 2 puff  2 puff Inhalation 4x daily Gillermina Slight, DO   2 puff at 23 1522       Physical Examination:      Vitals:  BP (!) 99/56   Pulse 77   Temp 98.2 °F (36.8 °C) (Oral)   Resp 18   Ht 5' 11\" (1.803 m)   Wt 180 lb (81.6 kg)   SpO2 98%   BMI 25.10 kg/m²   Temp (24hrs), Av.1 °F (36.7 °C), Min:97.9 °F (36.6 °C), Max:98.2 °F (36.8 °C)      General appearance: alert, cooperative and no distress. Elderly and chronically ill-appearing but able to have normal conversation.   Oriented to person, hospital.  He thought month was February  Lungs: Rhonchi appreciated  Heart: regular rate and rhythm, no murmur  Abdomen: soft, nontender, nondistended, bowel sounds present, no masses  Extremities: No pitting edema. No redness, tenderness in the calves. Cap refill <2s  Skin: no gross lesions, rashes    Data:     Labs:  Recent Labs     03/13/23  0502 03/14/23  0647   WBC 10.2 9.2   HGB 9.1* 9.1*    203       Recent Labs     03/13/23  0502 03/14/23  0944    144   K 3.3* 3.1*    106   CO2 34* 32*   BUN 30* 23   CREATININE 0.77 0.67*   GLUCOSE 94 96       No results for input(s): AST, ALT, ALB, BILITOT, ALKPHOS in the last 72 hours. Assessment and Plan:        1. Acute respiratory failure with hypoxia suspect primarily due to atelectasis related to mucous plugging, with superimposed Staph aureus pneumonia. S/p bronchoscopy on 3/10 with thick white secretions noted throughout tracheobronchial tree worse on the left with complete obstruction of left mainstem bronchus. Mucosa easily bleeds on suction trauma. Secretions were washed and cleaned  -Completed course of steroids  -Antibiotic per pulmonology. 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      37 minutes in total care time    Electronically signed by Ashley Sood MD on 3/14/2023 at 4:33 PM

## 2023-03-14 NOTE — PROGRESS NOTES
Physical Therapy Med Surg Initial Assessment  Facility/Department[facility]  [id number]       NAME: Marisela Mcarthur  : [birth year] (76 y.o.)  MRN: [id number]  CODE STATUS: Full Code    Date of Service: 3/14/2023    Patient Diagnosis(es): Hypoxia [R09.02]  Collapse of left lung [J98.11]  Acute respiratory failure with hypoxia (HCC) [J96.01]  Dyspnea, unspecified type [S03.86]  Combined systolic and diastolic congestive heart failure, unspecified HF chronicity ([facility] [I50.40]  COVID-19 [U07.1]   Chief Complaint   Patient presents with    Shortness of Breath     Pt was diagnosed with COVID 1 week ago and has had increased SOB and work of breathing. Pt was a wet cough noted. Pt on 4 L NC. Pt given 1 du neb and 125 mg solumedrol PTA. Pt is a/ox4. Patient Active Problem List    Diagnosis Date Noted    Collapse of left lung 2023    COVID-19 2023    Hypoxia 2023    Acute respiratory failure with hypoxia ([facility] 2023        Past Medical History:   Diagnosis Date    Atrial fibrillation ([facility]     Benign prostate hyperplasia     Hyperlipemia     Kidney disease     Prostate cancer ([facility]     Sleep apnea     Stroke [facility])     Urinary retention      Past Surgical History:   Procedure Laterality Date    PACEMAKER PLACEMENT N/A        Chart Reviewed: Yes  Family / Caregiver Present: No    Restrictions:  Restrictions/Precautions: Fall Risk, Contact Precautions     SUBJECTIVE:        Pain  Pain: No pain reported at beginning of session.  At end of session pt reports back pain - unable to state number and states he does not need medication    Prior Level of Function:  Social/Functional History  Additional Comments: Per H and P pt at NH and bed bound/w/c bound [facility]    OBJECTIVE:   Vision  Vision: Impaired (Pt with eyes closed throughout much of session)  Hearing: Exceptions to [facility] (appears [place])    Cognition:  Overall Orientation Status: Impaired  Orientation Level: Oriented to person (Simultaneous filing. User may not have seen previous data.)  Follows Commands: Impaired (delayed processing)  Overall Cognitive Status: Exceptions         ROM:  AROM: Grossly decreased, non-functional  PROM: Within functional limits    Strength:  Strength: Grossly decreased, non-functional    Neuro:  Balance  Sitting - Static: Poor;+ (Pt demonstrated poor balance throughtout transition to sitting. Able to maintain sitting approx 5 min with SBA once assisted into midline)  Sitting - Dynamic: Poor;+                      Coordination: Grossly decreased, non-functional         Bed mobility  Rolling to Left: Maximum assistance;2 Person assistance  Rolling to Right: Maximum assistance;2 Person assistance  Supine to Sit: 2 Person assistance;Maximum assistance  Sit to Supine: 2 Person assistance;Maximum assistance    Transfers  Sit to Stand: Unable to assess  Comment: Pt declines to attempt    Ambulation  Assistance: Unable to assess                   Activity Tolerance  Activity Tolerance: Patient tolerated evaluation without incident;Patient limited by endurance            ASSESSMENT:   Body Structures, Functions, Activity Limitations Requiring Skilled Therapeutic Intervention: Decreased functional mobility ; Decreased safe awareness;Decreased strength;Decreased endurance;Decreased balance  Decision Making: Medium Complexity  History: high  Exam: med  Clinical Presentation: med    Barriers to Learning: cognition            Assessment: Pt demonstrates deficits. Through H and P it is indicated pt is at bed/w/c bound level. Pt appears to be at baseline physical level.  PT at this level of care is not warranted  Requires PT Follow-Up: No              Safety Devices  Type of Devices: Left in bed, Bed alarm in place, Call light within reach      [facility] (6 CLICK) BASIC MOBILITY    6/24    Therapy Time:   Individual   Time In 0951   Time Out 1015   Minutes [address], [place], 03/14/23 at 10:26 AM         Definitions for assistance levels  Independent = pt does not require any physical supervision or assistance from another person for activity completion. Device may be needed.   Stand by assistance = pt requires verbal cues or instructions from another person, close to but not touching, to perform the activity  Minimal assistance= pt performs 75% or more of the activity; assistance is required to complete the activity  Moderate assistance= pt performs 50% of the activity; assistance is required to complete the activity  Maximal assistance = pt performs 25% of the activity; assistance is required to complete the activity  Dependent = pt requires total physical assistance to accomplish the task

## 2023-03-14 NOTE — PROGRESS NOTES
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]  Acute respiratory failure with hypoxia (HCC) [J96.01]  Dyspnea, unspecified type [B87.34]  Combined systolic and diastolic congestive heart failure, unspecified HF chronicity ([facility] [I50.40]  COVID-19 [U07.1]   Patient Active Problem List    Diagnosis Date Noted    Collapse of left lung 2023    COVID-19 2023    Hypoxia 2023    Acute respiratory failure with hypoxia ([facility] 2023        Past Medical History:   Diagnosis Date    Atrial fibrillation ([facility]     Benign prostate hyperplasia     Hyperlipemia     Kidney disease     Prostate cancer ([facility]     Sleep apnea     Stroke [facility])     Urinary retention      Past Surgical History:   Procedure Laterality Date    PACEMAKER PLACEMENT N/A         Restrictions  Restrictions/Precautions: Fall Risk, Contact Precautions              Safety Devices: Safety Devices  Type of Devices: All fall risk precautions in place; Telesitter in use     Patient's date of birth confirmed: Yes    General:       Subjective          Pain at start of treatment: Yes: Pt. unable to rate pain-      Pain at end of treatment: Yes: Pt. unable to rate pain-      Location: back - repositioned for comfort  Description: ache  Nursing notified: Declined      Prior Level of Function:  Social/Functional History  Additional Comments: per chart review; pt is from [place] bed bound and wc bound; pt unable to provide full PLOF    OBJECTIVE:     Orientation Status:  Orientation  Overall Orientation Status: Impaired  Orientation Level: Oriented to person (Simultaneous filing. User may not have seen previous data.)    Observation:  Observation/Palpation  Observation: pt pleasant, cooperative, lethargic however responds appropriately. SPO2 97% on O2 NC    Cognition Status:  Cognition  Overall Cognitive Status: Exceptions  Arousal/Alertness: Delayed responses to stimuli  Following Commands: Follows one step commands with repetition; Inconsistently follows commands  Attention Span: Difficulty attending to directions; Difficulty dividing attention  Memory: Decreased recall of biographical Information;Decreased short term memory;Decreased recall of precautions;Decreased long term memory;Decreased recall of recent events  Safety Judgement: Decreased awareness of need for assistance;Decreased awareness of need for safety  Problem Solving: Assistance required to implement solutions;Assistance required to correct errors made;Assistance required to identify errors made;Assistance required to generate solutions  Insights: Decreased awareness of deficits  Initiation: Requires cues for some  Sequencing: Requires cues for some    Perception Status:       Vision and Hearing Status:  Hearing  Hearing: Exceptions to [facility]  Hearing Exceptions: Hard of hearing/hearing concerns        GROSS ASSESSMENT AROM/PROM:  AROM: Grossly decreased, non-functional (RUE grossly impaired from previous etiology, LUE WFL)  PROM: Grossly decreased, non-functional    ROM:   LUE AROM (degrees)  LUE AROM : WFL  Left Hand AROM (degrees)  Left Hand AROM: WFL  RUE AROM (degrees)  RUE AROM : Exceptions  RUE General AROM: grossly limited; full grasp present  Right Hand AROM (degrees)  Right Hand AROM: Exceptions  Right Hand General AROM: full grasp present with increased time and effort    UE STRENGTH:  Strength: Generally decreased, functional    UE COORDINATION:  Coordination: Grossly decreased, non-functional    UE TONE:  Tone: Normal    UE SENSATION:  Sensation:  (unable to assess)    Hand Dominance:  Hand Dominance  Hand Dominance: Right    ADL Status:  ADL  Feeding:  Moderate assistance  Grooming: Maximum assistance  UE Bathing: Dependent/Total  LE Bathing: Dependent/Total  UE Dressing: Dependent/Total  LE Dressing: Dependent/Total  Toileting: Dependent/Total  Additional Comments: simulated ADL seated EOB. levels as anticipated  Toilet Transfers  Toilet Transfer: Unable to assess  Toilet Transfers Comments: NT due to safety - anticipate bed level    Functional Mobility:    Transfers  Sit to stand: Unable to assess  Stand to sit: Unable to assess  Transfer Comments: unable to safety progress to standing    Patient ambulated NT due to unable to safely progress to standing. WC and bed level PLOF per chart review. Bed Mobility  Bed mobility  Supine to Sit: Maximum assistance;2 Person assistance  Sit to Supine: Maximum assistance;2 Person assistance    Seated and Standing Balance:  Balance  Sitting: Impaired (CGA)  Standing:  (unable to safely assess)    Functional Endurance:  Activity Tolerance  Activity Tolerance: Patient limited by fatigue;Treatment limited secondary to decreased cognition    D/C Recommendations:  OT D/C RECOMMENDATIONS  REQUIRES OT FOLLOW-UP: No    Equipment Recommendations:       OT Education:   Patient Education  Education Given To: Patient  Education Provided: Role of Therapy;Plan of Care  Education Method: Verbal  Barriers to Learning: Cognition    OT Follow Up:   OT D/C RECOMMENDATIONS  REQUIRES OT FOLLOW-UP: No       Assessment/Discharge Disposition:  Assessment: Pt is a 76 yr old male presenting to [facility] from facility. Pt presents near baseline function at this time. No OT indicated at present level.   Performance deficits / Impairments: Decreased functional mobility , Decreased balance, Decreased safe awareness, Decreased ADL status, Decreased endurance, Decreased strength, Decreased fine motor control, Decreased posture, Decreased ROM, Decreased coordination, Decreased cognition  Prognosis: Fair  No Skilled OT: At baseline function  Decision Making: Medium Complexity  History: mod complex  Exam: 11 perf deficits  Assistance / Modification: dep    [facility] Self care Score   How much help is needed for putting on and taking off regular lower body clothing?: Total  How much help is needed for bathing (which includes washing, rinsing, drying)?: Total  How much help is needed for toileting (which includes using toilet, bedpan, or urinal)?: Total  How much help is needed for putting on and taking off regular upper body clothing?: Total  How much help is needed for taking care of personal grooming?: Total  How much help for eating meals?: A Lot  AM-[facility] Inpatient Daily Activity Raw Score: 7  AM-PAC Inpatient ADL T-Scale Score : 20.13  ADL Inpatient CMS 0-100% Score: 92.44    Therapy key for assistance levels -   Independent/Mod I = Pt. is able to perform task with no assistance but may require a device   Stand by assistance = Pt. does not perform task at an independent level but does not need physical assistance, requires verbal cues  Minimal, Moderate, Maximal Assistance = Pt. requires physical assistance (25%, 50%, 75% assist from helper) for task but is able to actively participate in task   Dependent = Pt. requires total assistance with task and is not able to actively participate with task completion     Plan:  Occupational Therapy Plan  Times Per Week: N/A    Goals:   Patient will:    N/A    Patient Goal: Patient goals : pt did not state     Discussed and agreed upon: Yes Comments:       Therapy Time:   Individual   Time In 0951   Time Out 1015   Minutes 24          Eval: 24 minutes     Electronically signed by:    Sue Qureshi OT,   3/14/2023, 10:30 AM

## 2023-03-14 NOTE — PROGRESS NOTES
[address] [facility] Pharmacokinetic Monitoring Service - Vancomycin    Consulting Provider: Sapna Leon   Indication: ASP PNA  Target Concentration: Goal AUC/ALISSON 400-600 mg*hr/L  Day of Therapy: 3  Additional Antimicrobials: n/a    Pertinent Laboratory Values: Wt Readings from Last 1 Encounters:   03/08/23 180 lb (81.6 kg)     Temp Readings from Last 1 Encounters:   03/14/23 98.2 °F (36.8 °C) (Oral)     Estimated Creatinine Clearance: 95 mL/min (A) (based on SCr of 0.67 mg/dL (L)). Recent Labs     03/13/23  0502 03/14/23  0647 03/14/23  0944   CREATININE 0.77  --  0.67*   WBC 10.2 9.2  --      Procalcitonin:   Procalcitonin   Date Value Ref Range Status   03/08/2023 0.07 0.00 - 0.15 ng/mL Final     Comment:     Suspected Sepsis:  Low likelihood of sepsis  <.50 ng/mL    Increased likelihood of sepsis 0.50-2.00 ng/mL  Antibiotics encouraged    High risk of sepsis/shock   >2.00 ng/mL  Antibiotics strongly encouraged    Suspected Lower Respiratory Tract Infections:  Low likelihood of bacterial infection  <0.24 ng/mL    Increased likelihood of bacterial infection >0.24 ng/mL  Antibiotics encouraged    With successful antibiotic therapy, PCT levels should decrease  rapidly. (Half-life of 24 to 36 hours.)    Procalcitonin values from samples collected within the first  6 hours of systemic infection may still be low. Retesting may be indicated. Values from day 1 and day 4 can be entered into the Change in  Procalcitonin Calculator to determine the patient's  Mortality Risk Prognosis  ([email or web address])    In healthy neonates, plasma Procalcitonin (PCT) concentrations  increase gradually after birth, reaching peak values at about  24 hours of age then decrease to normal values below 0.5 ng/mL  by 48-72 hours of age. Pertinent Cultures:  Culture, Respiratory [[id number]] (Abnormal)  Collected: 03/10/23 0921   Order Status: Completed Specimen:  Body Fluid from Lung Updated: 03/13/23 1046 CULTURE, RESPIRATORY -- Abnormal     Direct Exam:  MODERATE NEUTROPHILS   Direct Exam:  FEW GRAM POSITIVE COCCI IN CLUSTERS   Performed at [address] [place] [address]   [phone]    Abnormal     Organism Staph aureus MRSA Abnormal     CULTURE, RESPIRATORY 10 to 50,000 CFU/ML   Narrative:     ORDER#: [id number]                          ORDERED BY: Camron Pena   SOURCE: Lung Body Fluid                    COLLECTED:  03/10/23 09:21   ANTIBIOTICS AT NIKIA.:                      RECEIVED :  03/10/23 10:06   Culture, Respiratory [[id number]] (Abnormal)  Collected: 03/10/23 0921   Order Status: Completed Specimen: Body Fluid from Lung Updated: 03/13/23 1039    CULTURE, RESPIRATORY -- Abnormal     Direct Exam:  MANY NEUTROPHILS   Direct Exam:  MANY GRAM POSITIVE COCCI IN CLUSTERS   Performed at [address] [place] [address]   [phone]    Abnormal     Organism Staph aureus MRSA Abnormal     CULTURE, RESPIRATORY >[id number] CFU/ML   Narrative:     ORDER#: [id number]                          ORDERED BY: Camron Pena   SOURCE: Lung Body Fluid                    COLLECTED:  03/10/23 09:21   ANTIBIOTICS AT NIKIA.:                      RECEIVED :  03/10/23 10:00       Recent vancomycin administrations                     vancomycin 1000 mg IVPB in 250 mL NS addavial (mg) 1,000 mg New Bag 03/13/23 0589    vancomycin (VANCOCIN) 1,250 mg in sodium chloride 0.9 % 250 mL IVPB (ADDAVIAL) (mg) 1,250 mg New Bag 03/12/23 1500                  Assessment:   Date/Time Current Dose Concentration Timing of Concentration (h) AUC   Leelee@[facility] Vanco 1000mg IV q12h 30. 0(while running) While infusing 519   Note: Serum concentrations collected for AUC dosing may appear elevated if collected in close proximity to the dose administered, this is not necessarily an indication of toxicity    Plan:  Current dosing regimen of vanco 1000mg IV q12h remains appropriate and predicted to be therapeutic ; , tr 16.0  Renal function stable, improving  [employer] updated.   Repeat vancomycin concentration ordered for 3/16/23 @ 0500  Pharmacy will continue to monitor patient and adjust therapy as indicated    Thank you for the consult,  Ashleigh Leyva, [facility]  3/14/2023 11:22 AM

## 2023-03-14 NOTE — CARE COORDINATION
Quality round completed with care management team. [place] spoke with Felipe Marshall. Pt is able to go to [facility] when medically clear No need for pre-cert. LSW will follow.      Electronically signed by MANDEEP Ruiz on 3/14/2023 at 9:27 AM

## 2023-03-14 NOTE — PROGRESS NOTES
Progress Note      Patient Name: Toby Parham Date: 3/8/2023  3:27 PM  MR #: [id number]  : 1945    Attending Physician: Mony Cee MD  Reason for consult: Elevated BNP and Troponin    History of Presenting Illness:      Molly Presley is a 76 y.o. male on hospital day 6 with a history of . History Obtained From:  patient, electronic medical record    Admitted w respiratory failure. Dx w COVID 6 days ago. Pt is coughing up very thick yellow sputum. He has significant cough. He is SOB. He has been at [facility] and transferred for worse respiratory distress. 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    He had CABG. 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 Was on Warfarin but due to inability to maintain therapeutic INR was change to Eliquis. He sees [facility] Cardio. He has a PPM for CHB. He has Mild LE Edema and Lung sounds are course and diminished. 3/10/23  no fever. Continues to cough up thick yellow sputum. Having coughing fits. On 4L O2 sats 92-99%   Hypotensive early in am.     3/13/23 Had Hemoptysis. Hb 9.1 and stable No Fever on 3L O2. Still wet course cough. C/o Panic attack but appears comfortable to me.     3/14/23 no fever on 3L O2. Somnolent. Coughing denies pain. History:      EKG:  V Paced 80 w underlying SR  Past Medical History:   Diagnosis Date    Atrial fibrillation ([facility] [address]     Benign prostate hyperplasia     Hyperlipemia     Kidney disease     Prostate cancer ([facility] [address]     Sleep apnea     Stroke [facility])     Urinary retention      Past Surgical History:   Procedure Laterality Date    PACEMAKER PLACEMENT N/A        Family History  History reviewed. No pertinent family history.   [] Unable to obtain due to ventilated and/ or neurologic status    Social History     Socioeconomic History    Marital status:      Spouse name: Not on file    Number of children: Not on file    Years of education: Not on file    Highest education level: Not on file Occupational History    Not on file   Tobacco Use    Smoking status: Never    Smokeless tobacco: Never   Substance and Sexual Activity    Alcohol use: Never    Drug use: Never    Sexual activity: Not on file   Other Topics Concern    Not on file   Social History Narrative    Not on file     Social Determinants of Health     Financial Resource Strain: Not on file   Food Insecurity: Not on file   Transportation Needs: Not on file   Physical Activity: Not on file   Stress: Not on file   Social Connections: Not on file   Intimate Partner Violence: Not on file   Housing Stability: Not on file      [] Unable to obtain due to ventilated and/ or neurologic status      Home Medications:      Medications Prior to Admission: Aspirin 81 MG CAPS, Take 81 mg by mouth daily  fenofibrate (TRICOR) 145 MG tablet, Take 145 mg by mouth  ferrous sulfate 324 (65 Fe) MG EC tablet, Take 324 mg by mouth  gabapentin (NEURONTIN) 600 MG tablet, Take 600 mg by mouth in the morning, at noon, and at bedtime. magnesium hydroxide (MILK OF MAGNESIA) 400 MG/5ML suspension, Take 30 mLs by mouth daily  Pantoprazole Sodium (PROTONIX PO), Take 40 mg by mouth daily  sertraline (ZOLOFT) 25 MG tablet, Take 75 mg by mouth daily  Vitamin D, Ergocalciferol, 22698 units CAPS, Take 1 tablet by mouth daily  acetaminophen (TYLENOL) 500 MG tablet, Take 2 tablets by mouth every 8 hours as needed  atorvastatin (LIPITOR) 40 MG tablet, Take 1 tablet by mouth daily  enoxaparin (LOVENOX) 40 MG/0.4ML, Inject 40 mg into the skin daily  ezetimibe (ZETIA) 10 MG tablet, Take 10 mg by mouth at bedtime  finasteride (PROSCAR) 5 MG tablet, Take 5 mg by mouth daily  guaiFENesin (MUCINEX) 600 MG extended release tablet, Take 1 tablet by mouth in the morning and at bedtime  HYDROcodone-acetaminophen (NORCO) 5-325 MG per tablet, Take 1 tablet by mouth every 6 hours as needed.   ipratropium-albuterol (DUONEB) 0.5-2.5 (3) MG/3ML SOLN nebulizer solution, Inhale 1 vial into the lungs 4 times daily  levoFLOXacin (LEVAQUIN) 750 MG tablet, Take 1 tablet by mouth daily  melatonin 3 MG TABS tablet, Take 1 tablet by mouth at bedtime  mirabegron (MYRBETRIQ) 50 MG TB24, Take 50 mg by mouth daily  predniSONE (DELTASONE) 20 MG tablet, Take 40 mg by mouth daily  tamsulosin (FLOMAX) 0.4 MG capsule, Take 0.4 mg by mouth at bedtime  methocarbamol (ROBAXIN) 750 MG tablet, Take 750 mg by mouth 3 times daily  furosemide (LASIX) 40 MG tablet, Take 40 mg by mouth daily  tadalafil (CIALIS) 5 MG tablet, Take 5 mg by mouth daily  [DISCONTINUED] Methocarbamol 1000 MG TABS, Take 750 mg by mouth 3 times daily    Current Hospital Medications:     Scheduled Meds:   vancomycin (VANCOCIN) intermittent dosing (placeholder)   Other RX Placeholder    vancomycin  1,000 mg IntraVENous Q12H    furosemide  40 mg IntraVENous Once    aspirin  81 mg Oral Daily    atorvastatin  80 mg Oral Nightly    ezetimibe  10 mg Oral Nightly    finasteride  5 mg Oral Daily    melatonin  3 mg Oral Nightly    methocarbamol  750 mg Oral TID    pantoprazole  40 mg Oral Daily    sertraline  75 mg Oral Daily    tamsulosin  0.4 mg Oral Nightly    clonazePAM  0.25 mg Oral Q12H    sodium chloride  1,000 mL IntraVENous Once    sodium chloride flush  5-40 mL IntraVENous 2 times per day    guaiFENesin  600 mg Oral BID    albuterol sulfate HFA  2 puff Inhalation 4x daily     Continuous Infusions:   sodium chloride       PRN Meds:.hydrOXYzine HCl, albuterol sulfate HFA, trimethobenzamide, sodium chloride flush, sodium chloride, polyethylene glycol, acetaminophen **OR** acetaminophen, benzonatate, guaiFENesin-dextromethorphan  .   sodium chloride          Allergies: Allergies   Allergen Reactions    Penicillins     Sulfamethoxazole Hives    Tapentadol Hives    Trimethoprim Hives        Review of Systems:       Review of Systems   Constitutional:  Positive for fatigue. Negative for diaphoresis. HENT: Negative. Eyes: Negative.     Respiratory:  Positive for cough and shortness of breath. Negative for chest tightness, wheezing and stridor. Cardiovascular: Negative. Negative for chest pain, palpitations and leg swelling. Gastrointestinal: Negative. Negative for blood in stool and nausea. Genitourinary: Negative. Musculoskeletal: Negative. Skin: Negative. Neurological:  Positive for weakness. Negative for dizziness, syncope and light-headedness. Hematological: Negative. Psychiatric/Behavioral: Negative. Objective Findings:     Vitals:BP (!) 99/56   Pulse 77   Temp 98.2 °F (36.8 °C) (Oral)   Resp 18   Ht 5' 11\" (1.803 m)   Wt 180 lb (81.6 kg)   SpO2 98%   BMI 25.10 kg/m²      Physical Examination:    Physical Exam   Constitutional: No distress. He appears chronically ill and acutely ill. HENT:   Normal cephalic and Atraumatic   Eyes: Pupils are equal, round, and reactive to light. Neck: Thyroid normal. No JVD present. No neck adenopathy. No thyromegaly present. Cardiovascular: Regular rhythm, intact distal pulses and normal pulses. Tachycardia present. Murmur heard. Pulmonary/Chest: Effort normal. He has scattered wheezes. He exhibits no tenderness. Course BS   Abdominal: Soft. Bowel sounds are normal. There is no abdominal tenderness. Musculoskeletal:         General: Edema (trace) present. No tenderness. Normal range of motion. Cervical back: Normal range of motion and neck supple. Neurological: He is alert and oriented to person, place, and time. Skin: Skin is warm. No cyanosis. Nails show no clubbing.        Results/ Medications reviewed 3/14/2023, 11:04 AM     Laboratory, Microbiology, Pathology, Radiology, Cardiology, Medications and Transcriptions reviewed  Scheduled Meds:   vancomycin (VANCOCIN) intermittent dosing (placeholder)   Other RX Placeholder    vancomycin  1,000 mg IntraVENous Q12H    furosemide  40 mg IntraVENous Once    aspirin  81 mg Oral Daily    atorvastatin  80 mg Oral Nightly    ezetimibe 10 mg Oral Nightly    finasteride  5 mg Oral Daily    melatonin  3 mg Oral Nightly    methocarbamol  750 mg Oral TID    pantoprazole  40 mg Oral Daily    sertraline  75 mg Oral Daily    tamsulosin  0.4 mg Oral Nightly    clonazePAM  0.25 mg Oral Q12H    sodium chloride  1,000 mL IntraVENous Once    sodium chloride flush  5-40 mL IntraVENous 2 times per day    guaiFENesin  600 mg Oral BID    albuterol sulfate HFA  2 puff Inhalation 4x daily     Continuous Infusions:   sodium chloride         Recent Labs     03/12/23  0604 03/13/23  0502 03/14/23  0647   WBC 7.2 10.2 9.2   HGB 8.9* 9.1* 9.1*   HCT 27.4* 28.5* 28.2*   MCV 88.9 87.9 91.4    235 203       Recent Labs     03/12/23  0604 03/13/23  0502 03/14/23  0944    143 144   K 3.6 3.3* 3.1*    102 106   CO2 35* 34* 32*   BUN 30* 30* 23   CREATININE 0.73 0.77 0.67*       No results for input(s): AST, ALT, ALB, BILIDIR, BILITOT, ALKPHOS in the last 72 hours. No results for input(s): LIPASE, AMYLASE in the last 72 hours. No results for input(s): PROT, INR in the last 72 hours. CTA CHEST W WO CONTRAST PE Eval    Result Date: 3/8/2023  EXAMINATION: CTA OF THE CHEST WITH AND WITHOUT CONTRAST 3/8/2023 5:33 pm TECHNIQUE: CTA of the chest was performed before and after the administration of intravenous contrast.  Multiplanar reformatted images are provided for review. MIP images are provided for review. Automated exposure control, iterative reconstruction, and/or weight based adjustment of the mA/kV was utilized to reduce the radiation dose to as low as reasonably achievable. COMPARISON: None.  HISTORY: ORDERING SYSTEM PROVIDED HISTORY: PE TECHNOLOGIST PROVIDED HISTORY: Reason for exam:->PE Decision Support Exception - unselect if not a suspected or confirmed emergency medical condition->Emergency Medical Condition (MA) What reading provider will be dictating this exam?->CRC FINDINGS: Pulmonary Arteries: Pulmonary arteries are adequately opacified for evaluation. No evidence of intraluminal filling defect to suggest pulmonary embolism. Main pulmonary artery is mildly prominent. Pulmonary arterial hypertension cannot be excluded. . Mediastinum: No evidence of mediastinal lymphadenopathy. The heart and pericardium demonstrate no acute abnormality. There is no acute abnormality of the thoracic aorta. There is mild aneurysmal dilatation of the ascending aorta which measures 4 cm. Lungs/pleura: 5 mm right upper lobe pulmonary nodule is noted (axial image 22). Atelectasis versus scarring is noted in the right lower lobe. There is right pleural calcification and pleural thickening which may be due to asbestos related pleural disease. There is volume loss in the left lung with mediastinal shift to the left. Consolidated left lung may reflect atelectasis or chronic scarring. There is pleural calcification and thickening in the left hemithorax which may rib reflect asbestos related pleural disease or chronic changes from remote hematoma or granulomatous disease such as tuberculosis. Upper Abdomen: Low-density lesions in the right kidney are likely cysts. 9 mm calcification in the left kidney and 1 cm calcification in the right kidney are consistent with renal calculi. Small calcified gallstones are noted. Soft Tissues/Bones: Post sternotomy changes are noted. Chronic T12 compression deformity is noted. There is fusion hardware in the visualized upper lumbar spine. 1. Negative study for pulmonary embolism. 2. Chronic scarring versus atelectasis in the left lung. 3. Bilateral pleural calcifications which may be due to asbestos related pleural disease, chronic hematomas or granulomatous disease. 4. Prominence of the main pulmonary arteries which may indicate pulmonary arterial hypertension. 5. Bilateral nephrolithiasis. 6. Cholelithiasis. 7. 5 mm right upper lobe pulmonary nodule.  RECOMMENDATIONS: Fleischner Society guidelines for follow-up and management of incidentally detected pulmonary nodules: Single Solid Nodule: Nodule size less than 6 mm In a low-risk patient, no routine follow-up. In a high-risk patient, optional CT at 12 months. - Low risk patients include individuals with minimal or absent history of smoking and other known risk factors. - High risk patients include individuals with a history or smoking or known risk factors. Radiology 2017 http://pubs. rsna.org/doi/full/10.1148/radiol. 9761267962     XR CHEST PORTABLE    Result Date: 3/8/2023  EXAMINATION: ONE XRAY VIEW OF THE CHEST 3/8/2023 4:47 pm COMPARISON: None. HISTORY: ORDERING SYSTEM PROVIDED HISTORY: sob TECHNOLOGIST PROVIDED HISTORY: Reason for exam:->sob What reading provider will be dictating this exam?->CRC FINDINGS: The lungs are without acute focal process. Small right pleural effusion. Probable left pleural effusion. No pneumothorax. Cardiomegaly. Left-sided transvenous pacer device, prosthetic heart valve and sternotomy wires noted. The osseous structures are without acute process. Right pleural effusion. Probable left pleural effusion. Cardiomegaly. 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 PAF - in SR today. A/C on hold due to Hemoptysis. Appears stable. Will need to resume A/C when stable  BIO MVR and AVR post IE   Echo EF 10%  BioMVRw Moderate MS  BioAVR w severe AS  CABG- add ASA and statin. BB when BP more stable. 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 Will need to address code status. Pt may benefit from tertiary care transfer. He normally sees [facility] Cardiologist.      Thank you for allowing us to participate in the care of this patient. Will continue to follow. Please call if questions or concerns arise.     Electronically signed by Isreal Rodríguez MD on 3/14/2023 at 11:04 AM

## 2023-03-15 LAB
ANION GAP SERPL CALCULATED.3IONS-SCNC: 4 MEQ/L (ref 9–15)
BASOPHILS ABSOLUTE: 0 K/UL (ref 0–0.2)
BASOPHILS RELATIVE PERCENT: 0.4 %
BUN SERPL-MCNC: 19 MG/DL (ref 8–23)
CALCIUM SERPL-MCNC: 8.3 MG/DL (ref 8.5–9.9)
CHLORIDE SERPL-SCNC: 108 MEQ/L (ref 95–107)
CO2 SERPL-SCNC: 30 MEQ/L (ref 20–31)
CREAT SERPL-MCNC: 0.61 MG/DL (ref 0.7–1.2)
EOSINOPHILS ABSOLUTE: 0.2 K/UL (ref 0–0.7)
EOSINOPHILS RELATIVE PERCENT: 2.4 %
GLUCOSE SERPL-MCNC: 84 MG/DL (ref 70–99)
HCT VFR BLD CALC: 27.9 % (ref 42–52)
HEMOGLOBIN: 8.7 G/DL (ref 14–18)
LYMPHOCYTES ABSOLUTE: 0.9 K/UL (ref 1–4.8)
LYMPHOCYTES RELATIVE PERCENT: 10.7 %
MAGNESIUM SERPL-MCNC: 1.8 MG/DL (ref 1.7–2.4)
MCH RBC QN AUTO: 28.3 PG (ref 27–31.3)
MCHC RBC AUTO-ENTMCNC: 31.3 % (ref 33–37)
MCV RBC AUTO: 90.4 FL (ref 79–92.2)
MONOCYTES ABSOLUTE: 0.6 K/UL (ref 0.2–0.8)
MONOCYTES RELATIVE PERCENT: 7.4 %
NEUTROPHILS ABSOLUTE: 6.3 K/UL (ref 1.4–6.5)
NEUTROPHILS RELATIVE PERCENT: 79.1 %
PDW BLD-RTO: 20.3 % (ref 11.5–14.5)
PLATELET # BLD: 213 K/UL (ref 130–400)
POTASSIUM SERPL-SCNC: 2.9 MEQ/L (ref 3.4–4.9)
PRELIMINARY: NORMAL
RBC # BLD: 3.08 M/UL (ref 4.7–6.1)
SARS-COV-2 RDRP RESP QL NAA+PROBE: DETECTED
SODIUM SERPL-SCNC: 142 MEQ/L (ref 135–144)
WBC # BLD: 8 K/UL (ref 4.8–10.8)

## 2023-03-15 PROCEDURE — 83735 ASSAY OF MAGNESIUM: CPT

## 2023-03-15 PROCEDURE — 94640 AIRWAY INHALATION TREATMENT: CPT

## 2023-03-15 PROCEDURE — 6370000000 HC RX 637 (ALT 250 FOR IP): Performed by: INTERNAL MEDICINE

## 2023-03-15 PROCEDURE — 2700000000 HC OXYGEN THERAPY PER DAY

## 2023-03-15 PROCEDURE — 80048 BASIC METABOLIC PNL TOTAL CA: CPT

## 2023-03-15 PROCEDURE — 2580000003 HC RX 258: Performed by: INTERNAL MEDICINE

## 2023-03-15 PROCEDURE — 94761 N-INVAS EAR/PLS OXIMETRY MLT: CPT

## 2023-03-15 PROCEDURE — 87635 SARS-COV-2 COVID-19 AMP PRB: CPT

## 2023-03-15 PROCEDURE — 6360000002 HC RX W HCPCS: Performed by: INTERNAL MEDICINE

## 2023-03-15 PROCEDURE — 99232 SBSQ HOSP IP/OBS MODERATE 35: CPT | Performed by: INTERNAL MEDICINE

## 2023-03-15 PROCEDURE — 99233 SBSQ HOSP IP/OBS HIGH 50: CPT | Performed by: INTERNAL MEDICINE

## 2023-03-15 PROCEDURE — 85025 COMPLETE CBC W/AUTO DIFF WBC: CPT

## 2023-03-15 PROCEDURE — 36415 COLL VENOUS BLD VENIPUNCTURE: CPT

## 2023-03-15 PROCEDURE — 94664 DEMO&/EVAL PT USE INHALER: CPT

## 2023-03-15 PROCEDURE — 1210000000 HC MED SURG R&B

## 2023-03-15 PROCEDURE — 6370000000 HC RX 637 (ALT 250 FOR IP): Performed by: REGISTERED NURSE

## 2023-03-15 RX ORDER — MAGNESIUM SULFATE IN WATER 40 MG/ML
2000 INJECTION, SOLUTION INTRAVENOUS ONCE
Status: COMPLETED | OUTPATIENT
Start: 2023-03-15 | End: 2023-03-15

## 2023-03-15 RX ORDER — POTASSIUM CHLORIDE 20 MEQ/1
40 TABLET, EXTENDED RELEASE ORAL ONCE
Status: COMPLETED | OUTPATIENT
Start: 2023-03-15 | End: 2023-03-15

## 2023-03-15 RX ORDER — POTASSIUM CHLORIDE 7.45 MG/ML
10 INJECTION INTRAVENOUS
Status: COMPLETED | OUTPATIENT
Start: 2023-03-15 | End: 2023-03-15

## 2023-03-15 RX ADMIN — METHOCARBAMOL 750 MG: 500 TABLET ORAL at 16:06

## 2023-03-15 RX ADMIN — GUAIFENESIN 600 MG: 600 TABLET, EXTENDED RELEASE ORAL at 08:22

## 2023-03-15 RX ADMIN — PANTOPRAZOLE SODIUM 40 MG: 40 TABLET, DELAYED RELEASE ORAL at 08:22

## 2023-03-15 RX ADMIN — POTASSIUM CHLORIDE 10 MEQ: 7.46 INJECTION, SOLUTION INTRAVENOUS at 10:46

## 2023-03-15 RX ADMIN — Medication 3 MG: at 20:44

## 2023-03-15 RX ADMIN — POTASSIUM CHLORIDE 10 MEQ: 7.46 INJECTION, SOLUTION INTRAVENOUS at 11:28

## 2023-03-15 RX ADMIN — FINASTERIDE 5 MG: 5 TABLET, FILM COATED ORAL at 08:22

## 2023-03-15 RX ADMIN — ALBUTEROL SULFATE 2 PUFF: 90 AEROSOL, METERED RESPIRATORY (INHALATION) at 21:44

## 2023-03-15 RX ADMIN — Medication 10 ML: at 20:44

## 2023-03-15 RX ADMIN — GUAIFENESIN AND DEXTROMETHORPHAN 5 ML: 100; 10 SYRUP ORAL at 20:43

## 2023-03-15 RX ADMIN — CLONAZEPAM 0.25 MG: 0.5 TABLET ORAL at 02:06

## 2023-03-15 RX ADMIN — EZETIMIBE 10 MG: 10 TABLET ORAL at 20:44

## 2023-03-15 RX ADMIN — METHOCARBAMOL 750 MG: 500 TABLET ORAL at 08:21

## 2023-03-15 RX ADMIN — ACETAMINOPHEN 650 MG: 325 TABLET ORAL at 12:00

## 2023-03-15 RX ADMIN — VANCOMYCIN HYDROCHLORIDE 1000 MG: 1 INJECTION, POWDER, LYOPHILIZED, FOR SOLUTION INTRAVENOUS at 06:20

## 2023-03-15 RX ADMIN — ASPIRIN 81 MG: 81 TABLET, COATED ORAL at 08:22

## 2023-03-15 RX ADMIN — BENZONATATE 100 MG: 100 CAPSULE ORAL at 20:44

## 2023-03-15 RX ADMIN — ALBUTEROL SULFATE 2 PUFF: 90 AEROSOL, METERED RESPIRATORY (INHALATION) at 07:40

## 2023-03-15 RX ADMIN — VANCOMYCIN HYDROCHLORIDE 1000 MG: 1 INJECTION, POWDER, LYOPHILIZED, FOR SOLUTION INTRAVENOUS at 18:45

## 2023-03-15 RX ADMIN — METHOCARBAMOL 750 MG: 500 TABLET ORAL at 20:44

## 2023-03-15 RX ADMIN — ALBUTEROL SULFATE 2 PUFF: 90 AEROSOL, METERED RESPIRATORY (INHALATION) at 12:02

## 2023-03-15 RX ADMIN — GUAIFENESIN 600 MG: 600 TABLET, EXTENDED RELEASE ORAL at 20:44

## 2023-03-15 RX ADMIN — TAMSULOSIN HYDROCHLORIDE 0.4 MG: 0.4 CAPSULE ORAL at 20:44

## 2023-03-15 RX ADMIN — CLONAZEPAM 0.25 MG: 0.5 TABLET ORAL at 16:06

## 2023-03-15 RX ADMIN — HYDROXYZINE HYDROCHLORIDE 25 MG: 25 TABLET ORAL at 20:44

## 2023-03-15 RX ADMIN — Medication 10 ML: at 08:22

## 2023-03-15 RX ADMIN — POTASSIUM CHLORIDE 10 MEQ: 7.46 INJECTION, SOLUTION INTRAVENOUS at 13:06

## 2023-03-15 RX ADMIN — ATORVASTATIN CALCIUM 80 MG: 80 TABLET, FILM COATED ORAL at 20:44

## 2023-03-15 RX ADMIN — ALBUTEROL SULFATE 2 PUFF: 90 AEROSOL, METERED RESPIRATORY (INHALATION) at 15:14

## 2023-03-15 RX ADMIN — POTASSIUM CHLORIDE 40 MEQ: 1500 TABLET, EXTENDED RELEASE ORAL at 10:54

## 2023-03-15 RX ADMIN — SERTRALINE HYDROCHLORIDE 75 MG: 50 TABLET ORAL at 08:21

## 2023-03-15 RX ADMIN — MAGNESIUM SULFATE HEPTAHYDRATE 2000 MG: 40 INJECTION, SOLUTION INTRAVENOUS at 16:05

## 2023-03-15 ASSESSMENT — ENCOUNTER SYMPTOMS
WHEEZING: 0
NAUSEA: 0
BLOOD IN STOOL: 0
EYES NEGATIVE: 1
GASTROINTESTINAL NEGATIVE: 1
STRIDOR: 0
CHEST TIGHTNESS: 0
SHORTNESS OF BREATH: 1
COUGH: 1

## 2023-03-15 ASSESSMENT — PAIN DESCRIPTION - ORIENTATION: ORIENTATION: RIGHT

## 2023-03-15 ASSESSMENT — PAIN SCALES - GENERAL
PAINLEVEL_OUTOF10: 0
PAINLEVEL_OUTOF10: 5

## 2023-03-15 ASSESSMENT — PAIN DESCRIPTION - LOCATION: LOCATION: WRIST

## 2023-03-15 ASSESSMENT — PAIN DESCRIPTION - DESCRIPTORS: DESCRIPTORS: BURNING

## 2023-03-15 NOTE — PROGRESS NOTES
INPATIENT PROGRESS NOTES    PATIENT NAME: Charlene Lainez  MRN: [id number]  SERVICE DATE:  March 15, 2023   SERVICE TIME:  12:12 PM      PRIMARY SERVICE: Pulmonary Disease    CHIEF COMPLAIN: Shortness of breath      INTERVAL HPI: Patient seen and examined at bedside, Interval Notes, orders reviewed. Nursing notes noted  He is on 3 L O2 via nasal cannula O2 saturation 100%. He denies having short of breath. No chest pain. He has moist cough with thick yellow mucus. No fever or chills. No nausea vomiting or diarrhea. OBJECTIVE    Body mass index is 25.1 kg/m². PHYSICAL EXAM:  Vitals:  BP 96/63   Pulse 73   Temp 97.9 °F (36.6 °C) (Oral)   Resp 18   Ht 5' 11\" (1.803 m)   Wt 180 lb (81.6 kg)   SpO2 100%   BMI 25.10 kg/m²   General: Alert, awake . comfortable in bed, No distress. Head: Atraumatic , Normocephalic   Eyes: PERRL. No sclera icterus. No conjunctival injection. No discharge   ENT: No nasal  discharge. Pharynx clear. Neck:  Trachea midline. No thyromegaly, no JVD, No cervical adenopathy. Chest : Bilaterally symmetrical ,Normal effort,  No accessory muscle use  Lung : .Fair BS bilateral, decreased BS at bases. No Rales. No wheezing. Bilateral scattered rhonchi. Heart[de-identified] Normal  rate. Regular rhythm. No mumur ,  Rub or gallop  ABD: Non-tender. Non-distended. No masses. No organmegaly. Normal bowel sounds. No hernia. Ext : Trace pitting both leg , No Cyanosis No clubbing  Neuro: no focal weakness          DATA:   Recent Labs     03/14/23  0647 03/15/23  0503   WBC 9.2 8.0   HGB 9.1* 8.7*   HCT 28.2* 27.9*   MCV 91.4 90.4    213     Recent Labs     03/14/23  0944 03/15/23  0503    142   K 3.1* 2.9*    108*   CO2 32* 30   BUN 23 19   CREATININE 0.67* 0.61*   GLUCOSE 96 84   CALCIUM 8.8 8.3*   LABGLOM >60.0 >60.0       MV Settings:     FiO2 : 50 %    No results for input(s): PHART, LQG6YKA, PO2ART, XXM6HLG, BEART, H9ZPBNKD in the last 72 hours.     O2 Device: Nasal cannula  O2 Flow Rate (L/min): 2.5 L/min    ADULT DIET; Dysphagia - Soft and Bite Sized     MEDICATIONS during current hospitalization:    Continuous Infusions:   sodium chloride         Scheduled Meds:   potassium chloride  10 mEq IntraVENous Q1H    magnesium sulfate  2,000 mg IntraVENous Once    vancomycin (VANCOCIN) intermittent dosing (placeholder)   Other RX Placeholder    vancomycin  1,000 mg IntraVENous Q12H    furosemide  40 mg IntraVENous Once    aspirin  81 mg Oral Daily    atorvastatin  80 mg Oral Nightly    ezetimibe  10 mg Oral Nightly    finasteride  5 mg Oral Daily    melatonin  3 mg Oral Nightly    methocarbamol  750 mg Oral TID    pantoprazole  40 mg Oral Daily    sertraline  75 mg Oral Daily    tamsulosin  0.4 mg Oral Nightly    clonazePAM  0.25 mg Oral Q12H    sodium chloride  1,000 mL IntraVENous Once    sodium chloride flush  5-40 mL IntraVENous 2 times per day    guaiFENesin  600 mg Oral BID    albuterol sulfate HFA  2 puff Inhalation 4x daily       PRN Meds:hydrOXYzine HCl, albuterol sulfate HFA, trimethobenzamide, sodium chloride flush, sodium chloride, polyethylene glycol, acetaminophen **OR** acetaminophen, benzonatate, guaiFENesin-dextromethorphan    Radiology  CTA CHEST W WO CONTRAST PE Eval    Result Date: 3/8/2023  EXAMINATION: CTA OF THE CHEST WITH AND WITHOUT CONTRAST 3/8/2023 5:33 pm TECHNIQUE: CTA of the chest was performed before and after the administration of intravenous contrast.  Multiplanar reformatted images are provided for review. MIP images are provided for review. Automated exposure control, iterative reconstruction, and/or weight based adjustment of the mA/kV was utilized to reduce the radiation dose to as low as reasonably achievable. COMPARISON: None.  HISTORY: ORDERING SYSTEM PROVIDED HISTORY: PE TECHNOLOGIST PROVIDED HISTORY: Reason for exam:->PE Decision Support Exception - unselect if not a suspected or confirmed emergency medical condition->Emergency Medical Condition (MA) What reading provider will be dictating this exam?->CRC FINDINGS: Pulmonary Arteries: Pulmonary arteries are adequately opacified for evaluation. No evidence of intraluminal filling defect to suggest pulmonary embolism. Main pulmonary artery is mildly prominent. Pulmonary arterial hypertension cannot be excluded. . Mediastinum: No evidence of mediastinal lymphadenopathy. The heart and pericardium demonstrate no acute abnormality. There is no acute abnormality of the thoracic aorta. There is mild aneurysmal dilatation of the ascending aorta which measures 4 cm. Lungs/pleura: 5 mm right upper lobe pulmonary nodule is noted (axial image 22). Atelectasis versus scarring is noted in the right lower lobe. There is right pleural calcification and pleural thickening which may be due to asbestos related pleural disease. There is volume loss in the left lung with mediastinal shift to the left. Consolidated left lung may reflect atelectasis or chronic scarring. There is pleural calcification and thickening in the left hemithorax which may rib reflect asbestos related pleural disease or chronic changes from remote hematoma or granulomatous disease such as tuberculosis. Upper Abdomen: Low-density lesions in the right kidney are likely cysts. 9 mm calcification in the left kidney and 1 cm calcification in the right kidney are consistent with renal calculi. Small calcified gallstones are noted. Soft Tissues/Bones: Post sternotomy changes are noted. Chronic T12 compression deformity is noted. There is fusion hardware in the visualized upper lumbar spine. 1. Negative study for pulmonary embolism. 2. Chronic scarring versus atelectasis in the left lung. 3. Bilateral pleural calcifications which may be due to asbestos related pleural disease, chronic hematomas or granulomatous disease. 4. Prominence of the main pulmonary arteries which may indicate pulmonary arterial hypertension. 5. Bilateral nephrolithiasis.  6. Cholelithiasis. 7. 5 mm right upper lobe pulmonary nodule. RECOMMENDATIONS: Fleischner Society guidelines for follow-up and management of incidentally detected pulmonary nodules: Single Solid Nodule: Nodule size less than 6 mm In a low-risk patient, no routine follow-up. In a high-risk patient, optional CT at 12 months. - Low risk patients include individuals with minimal or absent history of smoking and other known risk factors. - High risk patients include individuals with a history or smoking or known risk factors. Radiology 2017 http://pubs. rsna.org/doi/full/10.1148/radiol. 7929129018     XR CHEST PORTABLE    Result Date: 3/12/2023  EXAMINATION: ONE XRAY VIEW OF THE CHEST 3/12/2023 2:52 pm COMPARISON: Chest series from March 11, 2023 HISTORY: ORDERING SYSTEM PROVIDED HISTORY: pneumonia TECHNOLOGIST PROVIDED HISTORY: Reason for exam:->pneumonia What reading provider will be dictating this exam?->CRC FINDINGS: Left anterior chest wall cardiac support device with stable position of distal leads. Midline sternotomy hardware and postsurgical changes consistent with prior CABG. Valve prosthesis. There appears to be mild bilateral lung hyperinflation and background coarsened interstitial markings. Persistent dense retrocardiac and left basilar opacities. Faint bilateral interstitial opacities. Possible left pleural effusion. No obvious pneumothorax. Osseous and thoracic soft tissue structures demonstrate no acute findings. Osseous mineralization is decreased. 1.  Stable chest series. No change in dense retrocardiac and left basilar opacities. No change in possible left pleural effusion nor the faint interstitial opacities. 2.  Background coarsened interstitial markings. Atherosclerotic disease, cardiomegaly, and postsurgical changes.      XR CHEST PORTABLE    Result Date: 3/11/2023  EXAMINATION: ONE XRAY VIEW OF THE CHEST 3/11/2023 9:14 am COMPARISON: 03/10/2023, CT 03/08/2023 HISTORY: ORDERING SYSTEM PROVIDED HISTORY: hypoxia TECHNOLOGIST PROVIDED HISTORY: Reason for exam:->hypoxia What reading provider will be dictating this exam?->CRC FINDINGS: Left-sided cardiac pacemaker. Median sternotomy and cardiac valve. Previous spine surgery. Left greater than right pleural effusions. Diffuse interstitial prominence suggesting component of pulmonary edema or atypical infection in the acute setting. Hazy right lung base opacity likely atelectasis or scar. Dense opacity in the left lung base. Improved aeration of the left upper and mid lung zone with some residual strandy opacity. Cardiomegaly. Atherosclerotic vascular calcifications. Dense opacity in the left lung base likely atelectasis or pneumonia. Diffuse interstitial prominence suggestive of atypical infection or edema possibly superimposed on chronic lung changes. Improved aeration of the left mid and upper lung zone with residual strandy opacity in this region likely representing atelectasis/scar or inflammatory disease. Left greater than right pleural effusions. Cardiomegaly. XR CHEST PORTABLE    Result Date: 3/10/2023  EXAMINATION: ONE XRAY VIEW OF THE CHEST 3/10/2023 5:39 am COMPARISON: March 8, 2023 HISTORY: ORDERING SYSTEM PROVIDED HISTORY: hypoxia, sob TECHNOLOGIST PROVIDED HISTORY: Reason for exam:->hypoxia, sob What reading provider will be dictating this exam?->CRC FINDINGS: Unchanged complete white out left hemithorax with leftward shift of the cardio mediastinum. Pacemaker, prosthetic valve and sternal wires noted. Right lung remains clear. No pneumothorax or effusion. 1. No acute disease. No change from March 8, 2023. RECOMMENDATION: Careful clinical correlation and follow up recommended. XR CHEST PORTABLE    Result Date: 3/8/2023  EXAMINATION: ONE XRAY VIEW OF THE CHEST 3/8/2023 4:47 pm COMPARISON: None.  HISTORY: ORDERING SYSTEM PROVIDED HISTORY: sob TECHNOLOGIST PROVIDED HISTORY: Reason for exam:->sob What reading provider will be dictating this exam?->CRC FINDINGS: The lungs are without acute focal process. Small right pleural effusion. Probable left pleural effusion. No pneumothorax. Cardiomegaly. Left-sided transvenous pacer device, prosthetic heart valve and sternotomy wires noted. The osseous structures are without acute process. Right pleural effusion. Probable left pleural effusion. Cardiomegaly. Bronchoscopy    Result Date: 3/10/2023  [facility] Patient: Bharath Soler MRN: [id number] : 1945 Account: [de-identified] Gender: Male Age: 76 Years Procedure: Bronchoscopy Date: 3/10/2023 Attending Physician: Shane Alfaro Indications:        -  Atelectasis of the left lower lobe Complications:        -  No immediate complications Estimated Blood Loss:        -  Estimated blood loss was minimal. Procedure:        - The scope was introduced through the mouth, via the endotracheal tube (the           patient was intubated for the procedure) and advanced to tracheobronchial tree. -  The patient tolerated the procedure well. -  The procedure was accomplished without difficulty. Findings:        - see note in epic please Impression:        -  Atelectasis of the left lower lobe        - see note in epic please        -  No specimens collected. Procedure Code(s):        - [id number], Bronchoscopy, rigid or flexible, including fluoroscopic guidance,           when performed; diagnostic, with cell washing, when performed (separate           procedure) Diagnosis Code(s):        - J98.11, Atelectasis       CPT(R) - 202 copyright [employer]. All Rights Reserved. The CPT codes, CCI edits and ICD codes generated are intended as suggestions       and were generated based on input data. These codes are preliminary and upon        review may be revised to meet current compliance and payer requirements.        The provider is responsible for the final determination of appropriate codes, and modifiers. Scope Withdrawal Time:       00:00:02 SANTOS LEW This document has been electronically signed. Note Initiated:3/10/2023 Note Completed:3/10/2023 9:29 AM        IMPRESSION AND SUGGESTION:  Acute on chronic respiratory failure with hypoxia  Left lung collapse status post bronchoscopy with aspiration of tracheobronchial secretion. Staph aureus pneumonia  COVID-19 infection  Rule out like likely due to asbestos exposure  Bilateral pleural effusion  5 mm lung nodule    Continue O2 to keep saturation 90% or above. Continue  Aspiration precaution. Chest x-ray noted. Aspiration precaution. Bronchodilator therapy continue guaifenesin. Chest x-ray shows dense retrocardiac and left basilar opacity. Possible left pleural effusion. Continue present treatment plan. NOTE: This report was transcribed using voice recognition software. Every effort was made to ensure accuracy; however, inadvertent computerized transcription errors may be present.       Electronically signed by Jim Martinez MD, FCCP on 3/15/2023 at 12:12 PM

## 2023-03-15 NOTE — PROGRESS NOTES
PT DECLINED HOME CPAP FOR HS. PT SAYS THAT CPAP IS NOT WORKING PROPERLY. SUGGESTED TO PT TO CONTACT HOME CARE COMPANY FOR REPAIR.

## 2023-03-15 NOTE — FLOWSHEET NOTE
Reported to Dr. Grady Souza that at [id number], pt had run of torsades that lasted 4.85 seconds according to [facility] in [place]. Also notified him that at [address] [facility], pt had 6 beats of V-tach and another 4 beat run of vtach shortly after. 2g of IV Mg Sulfate ordered.

## 2023-03-15 NOTE — FLOWSHEET NOTE
Dr. Moise Back notified of soft vital signs and run of torsades.       03/15/23 0559   Vitals   Temp 97.7 °F (36.5 °C)   Heart Rate 75   BP (!) 96/58   MAP (Calculated) 71   MAP (mmHg) 68   Oxygen Therapy   SpO2 100 %

## 2023-03-15 NOTE — PROGRESS NOTES
Progress Note      Patient Name: Le Mahoney Date: 3/8/2023  3:27 PM  MR #: [id number]  : 1945    Attending Physician: Gume Smith MD  Reason for consult: Elevated BNP and Troponin    History of Presenting Illness:      Raissa Tolentino is a 76 y.o. male on hospital day 7 with a history of . History Obtained From:  patient, electronic medical record    Admitted w respiratory failure. Dx w COVID 6 days ago. Pt is coughing up very thick yellow sputum. He has significant cough. He is SOB. He has been at [facility] and transferred for worse respiratory distress. 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    He had CABG. 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 Was on Warfarin but due to inability to maintain therapeutic INR was change to Eliquis. He sees [facility] Cardio. He has a PPM for CHB. He has Mild LE Edema and Lung sounds are course and diminished. 3/10/23  no fever. Continues to cough up thick yellow sputum. Having coughing fits. On 4L O2 sats 92-99%   Hypotensive early in am.     3/13/23 Had Hemoptysis. Hb 9.1 and stable No Fever on 3L O2. Still wet course cough. C/o Panic attack but appears comfortable to me.     3/14/23 no fever on 3L O2. Somnolent. Coughing denies pain. 3/15/23 Somnolent. Slow speech no fever. Coughing fits. No acute events on 2.5 L O2  History:      EKG:  V Paced 75w underlying SR  Past Medical History:   Diagnosis Date    Atrial fibrillation ([facility] [address]     Benign prostate hyperplasia     Hyperlipemia     Kidney disease     Prostate cancer ([facility] [address]     Sleep apnea     Stroke [facility])     Urinary retention      Past Surgical History:   Procedure Laterality Date    BRONCHOSCOPY N/A 3/10/2023    BRONCHOSCOPY AIRWAY CLEARANCE BRONCHOALVEOLAR performed by Shirley Jenikns MD at [place] [address] N/A        Family History  History reviewed. No pertinent family history.   [] Unable to obtain due to ventilated and/ or neurologic status    Social History     Socioeconomic History    Marital status:      Spouse name: Not on file    Number of children: Not on file    Years of education: Not on file    Highest education level: Not on file   Occupational History    Not on file   Tobacco Use    Smoking status: Never    Smokeless tobacco: Never   Substance and Sexual Activity    Alcohol use: Never    Drug use: Never    Sexual activity: Not on file   Other Topics Concern    Not on file   Social History Narrative    Not on file     Social Determinants of Health     Financial Resource Strain: Not on file   Food Insecurity: Not on file   Transportation Needs: Not on file   Physical Activity: Not on file   Stress: Not on file   Social Connections: Not on file   Intimate Partner Violence: Not on file   Housing Stability: Not on file      [] Unable to obtain due to ventilated and/ or neurologic status      Home Medications:      Medications Prior to Admission: Aspirin 81 MG CAPS, Take 81 mg by mouth daily  fenofibrate (TRICOR) 145 MG tablet, Take 145 mg by mouth  ferrous sulfate 324 (65 Fe) MG EC tablet, Take 324 mg by mouth  gabapentin (NEURONTIN) 600 MG tablet, Take 600 mg by mouth in the morning, at noon, and at bedtime.   magnesium hydroxide (MILK OF MAGNESIA) 400 MG/5ML suspension, Take 30 mLs by mouth daily  Pantoprazole Sodium (PROTONIX PO), Take 40 mg by mouth daily  sertraline (ZOLOFT) 25 MG tablet, Take 75 mg by mouth daily  Vitamin D, Ergocalciferol, 86239 units CAPS, Take 1 tablet by mouth daily  acetaminophen (TYLENOL) 500 MG tablet, Take 2 tablets by mouth every 8 hours as needed  atorvastatin (LIPITOR) 40 MG tablet, Take 1 tablet by mouth daily  enoxaparin (LOVENOX) 40 MG/0.4ML, Inject 40 mg into the skin daily  ezetimibe (ZETIA) 10 MG tablet, Take 10 mg by mouth at bedtime  finasteride (PROSCAR) 5 MG tablet, Take 5 mg by mouth daily  guaiFENesin (MUCINEX) 600 MG extended release tablet, Take 1 tablet by mouth in the morning and at bedtime  HYDROcodone-acetaminophen (NORCO) 5-325 MG per tablet, Take 1 tablet by mouth every 6 hours as needed. ipratropium-albuterol (DUONEB) 0.5-2.5 (3) MG/3ML SOLN nebulizer solution, Inhale 1 vial into the lungs 4 times daily  levoFLOXacin (LEVAQUIN) 750 MG tablet, Take 1 tablet by mouth daily  melatonin 3 MG TABS tablet, Take 1 tablet by mouth at bedtime  mirabegron (MYRBETRIQ) 50 MG TB24, Take 50 mg by mouth daily  predniSONE (DELTASONE) 20 MG tablet, Take 40 mg by mouth daily  tamsulosin (FLOMAX) 0.4 MG capsule, Take 0.4 mg by mouth at bedtime  methocarbamol (ROBAXIN) 750 MG tablet, Take 750 mg by mouth 3 times daily  furosemide (LASIX) 40 MG tablet, Take 40 mg by mouth daily  tadalafil (CIALIS) 5 MG tablet, Take 5 mg by mouth daily  [DISCONTINUED] Methocarbamol 1000 MG TABS, Take 750 mg by mouth 3 times daily    Current Hospital Medications:     Scheduled Meds:   potassium chloride  40 mEq Oral Once    vancomycin (VANCOCIN) intermittent dosing (placeholder)   Other RX Placeholder    vancomycin  1,000 mg IntraVENous Q12H    furosemide  40 mg IntraVENous Once    aspirin  81 mg Oral Daily    atorvastatin  80 mg Oral Nightly    ezetimibe  10 mg Oral Nightly    finasteride  5 mg Oral Daily    melatonin  3 mg Oral Nightly    methocarbamol  750 mg Oral TID    pantoprazole  40 mg Oral Daily    sertraline  75 mg Oral Daily    tamsulosin  0.4 mg Oral Nightly    clonazePAM  0.25 mg Oral Q12H    sodium chloride  1,000 mL IntraVENous Once    sodium chloride flush  5-40 mL IntraVENous 2 times per day    guaiFENesin  600 mg Oral BID    albuterol sulfate HFA  2 puff Inhalation 4x daily     Continuous Infusions:   sodium chloride       PRN Meds:.hydrOXYzine HCl, albuterol sulfate HFA, trimethobenzamide, sodium chloride flush, sodium chloride, polyethylene glycol, acetaminophen **OR** acetaminophen, benzonatate, guaiFENesin-dextromethorphan  .   sodium chloride          Allergies:      Allergies   Allergen Reactions Penicillins     Sulfamethoxazole Hives    Tapentadol Hives    Trimethoprim Hives        Review of Systems:       Review of Systems   Constitutional:  Positive for fatigue. Negative for diaphoresis. HENT: Negative. Eyes: Negative. Respiratory:  Positive for cough and shortness of breath. Negative for chest tightness, wheezing and stridor. Cardiovascular: Negative. Negative for chest pain, palpitations and leg swelling. Gastrointestinal: Negative. Negative for blood in stool and nausea. Genitourinary: Negative. Musculoskeletal: Negative. Skin: Negative. Neurological:  Positive for weakness. Negative for dizziness, syncope and light-headedness. Hematological: Negative. Psychiatric/Behavioral: Negative. Objective Findings:     Vitals:BP 96/63   Pulse 73   Temp 97.9 °F (36.6 °C) (Oral)   Resp 18   Ht 5' 11\" (1.803 m)   Wt 180 lb (81.6 kg)   SpO2 100%   BMI 25.10 kg/m²      Physical Examination:    Physical Exam   Constitutional: No distress. He appears chronically ill and acutely ill. HENT:   Normal cephalic and Atraumatic   Eyes: Pupils are equal, round, and reactive to light. Neck: Thyroid normal. No JVD present. No neck adenopathy. No thyromegaly present. Cardiovascular: Regular rhythm, intact distal pulses and normal pulses. Tachycardia present. Murmur heard. Pulmonary/Chest: Effort normal. He has scattered wheezes. He exhibits no tenderness. Course BS   Abdominal: Soft. Bowel sounds are normal. There is no abdominal tenderness. Musculoskeletal:         General: Edema (trace) present. No tenderness. Normal range of motion. Cervical back: Normal range of motion and neck supple. Neurological: He is alert and oriented to person, place, and time. Skin: Skin is warm. No cyanosis. Nails show no clubbing.        Results/ Medications reviewed 3/15/2023, 9:56 AM     Laboratory, Microbiology, Pathology, Radiology, Cardiology, Medications and Transcriptions reviewed  Scheduled Meds:   potassium chloride  40 mEq Oral Once    vancomycin (VANCOCIN) intermittent dosing (placeholder)   Other RX Placeholder    vancomycin  1,000 mg IntraVENous Q12H    furosemide  40 mg IntraVENous Once    aspirin  81 mg Oral Daily    atorvastatin  80 mg Oral Nightly    ezetimibe  10 mg Oral Nightly    finasteride  5 mg Oral Daily    melatonin  3 mg Oral Nightly    methocarbamol  750 mg Oral TID    pantoprazole  40 mg Oral Daily    sertraline  75 mg Oral Daily    tamsulosin  0.4 mg Oral Nightly    clonazePAM  0.25 mg Oral Q12H    sodium chloride  1,000 mL IntraVENous Once    sodium chloride flush  5-40 mL IntraVENous 2 times per day    guaiFENesin  600 mg Oral BID    albuterol sulfate HFA  2 puff Inhalation 4x daily     Continuous Infusions:   sodium chloride         Recent Labs     03/13/23  0502 03/14/23  0647 03/15/23  0503   WBC 10.2 9.2 8.0   HGB 9.1* 9.1* 8.7*   HCT 28.5* 28.2* 27.9*   MCV 87.9 91.4 90.4    203 213       Recent Labs     03/13/23  0502 03/14/23  0944 03/15/23  0503    144 142   K 3.3* 3.1* 2.9*    106 108*   CO2 34* 32* 30   BUN 30* 23 19   CREATININE 0.77 0.67* 0.61*       No results for input(s): AST, ALT, ALB, BILIDIR, BILITOT, ALKPHOS in the last 72 hours. No results for input(s): LIPASE, AMYLASE in the last 72 hours. No results for input(s): PROT, INR in the last 72 hours. CTA CHEST W WO CONTRAST PE Eval    Result Date: 3/8/2023  EXAMINATION: CTA OF THE CHEST WITH AND WITHOUT CONTRAST 3/8/2023 5:33 pm TECHNIQUE: CTA of the chest was performed before and after the administration of intravenous contrast.  Multiplanar reformatted images are provided for review. MIP images are provided for review. Automated exposure control, iterative reconstruction, and/or weight based adjustment of the mA/kV was utilized to reduce the radiation dose to as low as reasonably achievable. COMPARISON: None.  HISTORY: ORDERING SYSTEM PROVIDED HISTORY: PE TECHNOLOGIST PROVIDED HISTORY: Reason for exam:->PE Decision Support Exception - unselect if not a suspected or confirmed emergency medical condition->Emergency Medical Condition (MA) What reading provider will be dictating this exam?->CRC FINDINGS: Pulmonary Arteries: Pulmonary arteries are adequately opacified for evaluation. No evidence of intraluminal filling defect to suggest pulmonary embolism. Main pulmonary artery is mildly prominent. Pulmonary arterial hypertension cannot be excluded. . Mediastinum: No evidence of mediastinal lymphadenopathy. The heart and pericardium demonstrate no acute abnormality. There is no acute abnormality of the thoracic aorta. There is mild aneurysmal dilatation of the ascending aorta which measures 4 cm. Lungs/pleura: 5 mm right upper lobe pulmonary nodule is noted (axial image 22). Atelectasis versus scarring is noted in the right lower lobe. There is right pleural calcification and pleural thickening which may be due to asbestos related pleural disease. There is volume loss in the left lung with mediastinal shift to the left. Consolidated left lung may reflect atelectasis or chronic scarring. There is pleural calcification and thickening in the left hemithorax which may rib reflect asbestos related pleural disease or chronic changes from remote hematoma or granulomatous disease such as tuberculosis. Upper Abdomen: Low-density lesions in the right kidney are likely cysts. 9 mm calcification in the left kidney and 1 cm calcification in the right kidney are consistent with renal calculi. Small calcified gallstones are noted. Soft Tissues/Bones: Post sternotomy changes are noted. Chronic T12 compression deformity is noted. There is fusion hardware in the visualized upper lumbar spine. 1. Negative study for pulmonary embolism. 2. Chronic scarring versus atelectasis in the left lung.  3. Bilateral pleural calcifications which may be due to asbestos related pleural disease, chronic hematomas or granulomatous disease. 4. Prominence of the main pulmonary arteries which may indicate pulmonary arterial hypertension. 5. Bilateral nephrolithiasis. 6. Cholelithiasis. 7. 5 mm right upper lobe pulmonary nodule. RECOMMENDATIONS: Fleischner Society guidelines for follow-up and management of incidentally detected pulmonary nodules: Single Solid Nodule: Nodule size less than 6 mm In a low-risk patient, no routine follow-up. In a high-risk patient, optional CT at 12 months. - Low risk patients include individuals with minimal or absent history of smoking and other known risk factors. - High risk patients include individuals with a history or smoking or known risk factors. Radiology 2017 http://pubs. rsna.org/doi/full/10.1148/radiol. 3583694653     XR CHEST PORTABLE    Result Date: 3/8/2023  EXAMINATION: ONE XRAY VIEW OF THE CHEST 3/8/2023 4:47 pm COMPARISON: None. HISTORY: ORDERING SYSTEM PROVIDED HISTORY: sob TECHNOLOGIST PROVIDED HISTORY: Reason for exam:->sob What reading provider will be dictating this exam?->CRC FINDINGS: The lungs are without acute focal process. Small right pleural effusion. Probable left pleural effusion. No pneumothorax. Cardiomegaly. Left-sided transvenous pacer device, prosthetic heart valve and sternotomy wires noted. The osseous structures are without acute process. Right pleural effusion. Probable left pleural effusion. Cardiomegaly. 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 PAF - in SR today. A/C on hold due to Hemoptysis. Appears stable.   Will need to resume A/C when stable  BIO MVR and AVR post IE   Echo EF 10%  BioMVRw Moderate MS  BioAVR w severe AS  CABG- add ASA and statin. BB when BP more stable. HPL Statin low fat diet  HTN Stable   PPM CHB   C/o Panic Attack   Severe Hypokalemia- replace iv now. 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 He is in no condition currently to tolerate invasive CV procedures. Will need to address code status. Pt may benefit from tertiary care. He normally sees [facility] Cardiologist.   I called and spoke with Wife, Talia Rahman, @ [phone] and updated her regarding husbands CV condition. Ideally we want to maximize CV meds and when he recovers further w/u. May consider transfer to [facility] care as well. Discussed with Dr. Nance Necessary this am as well. Pt is critically ill and at risk of sudden cardiac death malignant arrhythmia and or mechanical Cardiac Failure. Can consider LV assist device. Thank you for allowing us to participate in the care of this patient. Will continue to follow. Please call if questions or concerns arise.     Electronically signed by Hetal White MD on 3/15/2023 at 9:56 AM

## 2023-03-15 NOTE — PROGRESS NOTES
[facility] Pharmacokinetic Monitoring Service - Vancomycin    Consulting Provider: Candelario Overton   Indication: ASP PNA  Target Concentration: Goal AUC/ALISSON 400-600 mg*hr/L  Day of Therapy: 4  Additional Antimicrobials: n/a    Pertinent Laboratory Values: Wt Readings from Last 1 Encounters:   03/08/23 180 lb (81.6 kg)     Temp Readings from Last 1 Encounters:   03/15/23 97.9 °F (36.6 °C) (Oral)     Estimated Creatinine Clearance: 105 mL/min (A) (based on SCr of 0.61 mg/dL (L)). Recent Labs     03/14/23  0647 03/14/23  0944 03/15/23  0503   CREATININE  --  0.67* 0.61*   WBC 9.2  --  8.0     Procalcitonin:   Procalcitonin   Date Value Ref Range Status   03/08/2023 0.07 0.00 - 0.15 ng/mL Final     Comment:     Suspected Sepsis:  Low likelihood of sepsis  <.50 ng/mL    Increased likelihood of sepsis 0.50-2.00 ng/mL  Antibiotics encouraged    High risk of sepsis/shock   >2.00 ng/mL  Antibiotics strongly encouraged    Suspected Lower Respiratory Tract Infections:  Low likelihood of bacterial infection  <0.24 ng/mL    Increased likelihood of bacterial infection >0.24 ng/mL  Antibiotics encouraged    With successful antibiotic therapy, PCT levels should decrease  rapidly. (Half-life of 24 to 36 hours.)    Procalcitonin values from samples collected within the first  6 hours of systemic infection may still be low. Retesting may be indicated. Values from day 1 and day 4 can be entered into the Change in  Procalcitonin Calculator to determine the patient's  Mortality Risk Prognosis  ([email or web address]-pct-[email or web address] com)    In healthy neonates, plasma Procalcitonin (PCT) concentrations  increase gradually after birth, reaching peak values at about  24 hours of age then decrease to normal values below 0.5 ng/mL  by 48-72 hours of age. Pertinent Cultures:  Culture, Respiratory [[id number]] (Abnormal)  Collected: 03/10/23 0921   Order Status: Completed Specimen:  Body Fluid from Lung Updated: 03/13/23 1046 CULTURE, RESPIRATORY -- Abnormal     Direct Exam:  MODERATE NEUTROPHILS   Direct Exam:  FEW GRAM POSITIVE COCCI IN CLUSTERS   Performed at [address] [place] [address]   [phone]    Abnormal     Organism Staph aureus MRSA Abnormal     CULTURE, RESPIRATORY 10 to 50,000 CFU/ML   Narrative:     ORDER#: [id number]                          ORDERED BY: Natalia Nixon   SOURCE: Lung Body Fluid                    COLLECTED:  03/10/23 09:21   ANTIBIOTICS AT NIKIA.:                      RECEIVED :  03/10/23 10:06   Culture, Respiratory [[id number]] (Abnormal)  Collected: 03/10/23 0921   Order Status: Completed Specimen: Body Fluid from Lung Updated: 03/13/23 1039    CULTURE, RESPIRATORY -- Abnormal     Direct Exam:  MANY NEUTROPHILS   Direct Exam:  MANY GRAM POSITIVE COCCI IN CLUSTERS   Performed at [address] [place] [address]   [phone]    Abnormal     Organism Staph aureus MRSA Abnormal     CULTURE, RESPIRATORY >656200 CFU/ML   Narrative:     ORDER#: [id number]                          ORDERED BY: Natalia Nixon   SOURCE: Lung Body Fluid                    COLLECTED:  03/10/23 09:21   ANTIBIOTICS AT NIKIA.:                      RECEIVED :  03/10/23 10:00       Recent vancomycin administrations                     vancomycin 1000 mg IVPB in 250 mL NS addavial (mg) 1,000 mg New Bag 03/13/23 0542    vancomycin (VANCOCIN) 1,250 mg in sodium chloride 0.9 % 250 mL IVPB (ADDAVIAL) (mg) 1,250 mg New Bag 03/12/23 1508                  Assessment:   Date/Time Current Dose Concentration Timing of Concentration (h) AUC   Darby@[facility] Vanco 1000mg IV q12h 30. 0(while running) While infusing 519   Note: Serum concentrations collected for AUC dosing may appear elevated if collected in close proximity to the dose administered, this is not necessarily an indication of toxicity    Plan:  Current dosing regimen of vanco 1000mg IV q12h remains appropriate and predicted to be therapeutic ; , tr 14.5  Renal function stable  [facility] updated.   Repeat vancomycin concentration ordered for 3/16/23 @ 0500  Pharmacy will continue to monitor patient and adjust therapy as indicated    Thank you for the consult,  Michael Dimas [facility]  3/15/2023 1:45 PM

## 2023-03-15 NOTE — PROGRESS NOTES
Comprehensive Nutrition Assessment    Type and Reason for Visit:  Initial, RD Nutrition Re-Screen/LOS    Nutrition Recommendations/Plan:   Continue diet dysphagia soft and bite sized    Start standard oral nutrition supplement TID     Malnutrition Assessment:  Malnutrition Status: At risk for malnutrition (Comment) (03/15/23 8623)    Context:  Chronic Illness     Findings of the 6 clinical characteristics of malnutrition:  Energy Intake:  Mild decrease in energy intake (Comment)  Weight Loss:  Unable to assess     Body Fat Loss:  Unable to assess     Muscle Mass Loss:  Unable to assess    Fluid Accumulation:  Unable to assess     Strength:  Not Performed    Nutrition Assessment:    Pt is at risk for malnutritiondue to inadequate po intake since admission. Unable to assess for wt changes, admission wt is stated with no updated wt and no recent wt hx in EMR. 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 Labs/meds reviewed. Trace generalized/trace-1+ pitting BLE edema noted. Nsg reports pt tires very easily likely contributing to poor po intake Wound Type: Skin Tears (back)       Current Nutrition Intake & Therapies:    Average Meal Intake: 1-25%  Average Supplements Intake: None Ordered  ADULT DIET; Dysphagia - Soft and Bite Sized  ADULT ORAL NUTRITION SUPPLEMENT; Breakfast, Lunch, Dinner; Standard High Calorie/High Protein Oral Supplement    Anthropometric Measures:  Height: 5' 11\" (180.3 cm)  Ideal Body Weight (IBW): 172 lbs (78 kg)    Admission Body Weight: 180 lb (81.6 kg) (stated)  Current Body Weight:  (N/A),   Current BMI (kg/m2):  25.10  Usual Body Weight: 180 lb (81.6 kg) (11/8/21-[facility])                       BMI Categories: Overweight (BMI 25.0-29. 9)    Estimated Daily Nutrient Needs:  Energy Requirements Based On: Kcal/kg  Weight Used for Energy Requirements: Admission  Energy (kcal/day): 4991-9713 (kg x 20-22)  Weight Used for Protein Requirements: Ideal  Protein (g/day): 101 gm (kg IBW x 1.3)  Method Used for Fluid Requirements: 1 ml/kcal  Fluid (ml/day): ~1700 ml    Nutrition Diagnosis:   Inadequate oral intake related to cardiac dysfunction, impaired respiratory function as evidenced by intake 0-25%    Nutrition Interventions:   Food and/or Nutrient Delivery: Continue Current Diet, Start Oral Nutrition Supplement (Continue diet dysphagia soft and bite sized  Start standard oral nutrition supplement TID)  Nutrition Education/Counseling: No recommendation at this time  Coordination of Nutrition Care: Continue to monitor while inpatient       Goals:     Goals: PO intake 50% or greater       Nutrition Monitoring and Evaluation:      Food/Nutrient Intake Outcomes: Food and Nutrient Intake, Supplement Intake  Physical Signs/Symptoms Outcomes: Biochemical Data, Meal Time Behavior, Weight    Discharge Planning:     Too soon to determine     Sam Tong RD, LD

## 2023-03-15 NOTE — PROGRESS NOTES
Physician Progress Note    3/15/2023   1:23 PM    Name:  Charlene Lainez  MRN:    [id number]     IP Day: 7     Admit Date: 3/8/2023  3:27 PM  PCP: Lc Cabrera MD    Code Status:  Full Code    Subjective:     He still has a significant cough with shortness of breath.     Current Facility-Administered Medications   Medication Dose Route Frequency Provider Last Rate Last Admin    magnesium sulfate 2000 mg in 50 mL IVPB premix  2,000 mg IntraVENous Once Marie Boothe MD        vancomycin (VANCOCIN) intermittent dosing (placeholder)   Other RX Placeholder Daphnie Valentin MD        vancomycin 1000 mg IVPB in 250 mL NS addavial  1,000 mg IntraVENous Q12H Daphnie Valentin MD   Stopped at 03/15/23 0730    hydrOXYzine HCl (ATARAX) tablet 25 mg  25 mg Oral Q6H PRN Ira Cono, DO   25 mg at 03/14/23 2137    albuterol sulfate HFA (PROVENTIL;VENTOLIN;PROAIR) 108 (90 Base) MCG/ACT inhaler 2 puff  2 puff Inhalation Q2H PRN Ira Coon, DO        furosemide (LASIX) injection 40 mg  40 mg IntraVENous Once Marie Boothe MD        aspirin EC tablet 81 mg  81 mg Oral Daily Marie Boothe MD   81 mg at 03/15/23 6230    atorvastatin (LIPITOR) tablet 80 mg  80 mg Oral Nightly Marie Boothe MD   80 mg at 03/14/23 2137    ezetimibe (ZETIA) tablet 10 mg  10 mg Oral Nightly Ira Ookala, DO   10 mg at 03/14/23 2136    finasteride (PROSCAR) tablet 5 mg  5 mg Oral Daily Ira Ookala, DO   5 mg at 03/15/23 8470    melatonin tablet 3 mg  3 mg Oral Nightly Ira Ookala, DO   3 mg at 03/14/23 2136    methocarbamol (ROBAXIN) tablet 750 mg  750 mg Oral TID Ira Ookala, DO   750 mg at 03/15/23 3794    pantoprazole (PROTONIX) tablet 40 mg  40 mg Oral Daily Ira Ookala, DO   40 mg at 03/15/23 0315    sertraline (ZOLOFT) tablet 75 mg  75 mg Oral Daily Ira Ookala, DO   75 mg at 03/15/23 2889    tamsulosin (FLOMAX) capsule 0.4 mg  0.4 mg Oral Nightly Ira Coon DO   0.4 mg at 03/14/23 2131    trimethobenzamide (TIGAN) injection 200 mg  200 mg IntraMUSCular Q6H PRN Piedad Best, DO        clonazePAM (KLONOPIN) tablet 0.25 mg  0.25 mg Oral Q12H Shirley Jenkins MD   0.25 mg at 03/15/23 0206    0.9 % sodium chloride bolus  1,000 mL IntraVENous Once Piedad Best, DO   Held at 23 1628    sodium chloride flush 0.9 % injection 5-40 mL  5-40 mL IntraVENous 2 times per day Piedad Best, DO   10 mL at 03/15/23 1578    sodium chloride flush 0.9 % injection 5-40 mL  5-40 mL IntraVENous PRN Piedad Best, DO   10 mL at 23 0520    0.9 % sodium chloride infusion   IntraVENous PRN Piedad Best, DO        polyethylene glycol (GLYCOLAX) packet 17 g  17 g Oral Daily PRN Piedad Best, DO        acetaminophen (TYLENOL) tablet 650 mg  650 mg Oral Q6H PRN Piedad Best, DO   650 mg at 03/15/23 1200    Or    acetaminophen (TYLENOL) suppository 650 mg  650 mg Rectal Q6H PRN Piedad Best, DO        guaiFENesin [facility]) extended release tablet 600 mg  600 mg Oral BID Piedad Best, DO   600 mg at 03/15/23 9224    benzonatate (TESSALON) capsule 100 mg  100 mg Oral TID PRN Piedad Best, DO   100 mg at 23 2136    guaiFENesin-dextromethorphan (ROBITUSSIN DM) 100-10 MG/5ML syrup 5 mL  5 mL Oral Q4H PRN Piedad Best, DO        albuterol sulfate HFA (PROVENTIL;VENTOLIN;PROAIR) 108 (90 Base) MCG/ACT inhaler 2 puff  2 puff Inhalation 4x daily Piedad Best, DO   2 puff at 03/15/23 1202       Physical Examination:      Vitals:  BP 96/63   Pulse 73   Temp 97.9 °F (36.6 °C) (Oral)   Resp 18   Ht 5' 11\" (1.803 m)   Wt 180 lb (81.6 kg)   SpO2 100%   BMI 25.10 kg/m²   Temp (24hrs), Av.8 °F (36.6 °C), Min:97.7 °F (36.5 °C), Max:97.9 °F (36.6 °C)    General appearance: alert, cooperative and no distress. Elderly and chronically ill-appearing but able to have normal conversation.   Oriented to person, hospital.  He thought month was February  Lungs: Rhonchi appreciated  Heart: regular rate and rhythm, no murmur  Abdomen: soft, nontender, nondistended, bowel sounds present, no masses  Extremities: No pitting edema. No redness, tenderness in the calves. Cap refill <2s  Skin: no gross lesions, rashes    Data:     Labs:  Recent Labs     03/14/23  0647 03/15/23  0503   WBC 9.2 8.0   HGB 9.1* 8.7*    213       Recent Labs     03/14/23  0944 03/15/23  0503    142   K 3.1* 2.9*    108*   CO2 32* 30   BUN 23 19   CREATININE 0.67* 0.61*   GLUCOSE 96 84       No results for input(s): AST, ALT, ALB, BILITOT, ALKPHOS in the last 72 hours. Assessment and Plan:        1. Acute respiratory failure with hypoxia suspect primarily due to atelectasis related to mucous plugging, with superimposed Staph aureus pneumonia. S/p bronchoscopy on 3/10 with thick white secretions noted throughout tracheobronchial tree worse on the left with complete obstruction of left mainstem bronchus. Mucosa easily bleeds on suction trauma. Secretions were washed and cleaned  -Completed course of steroids  -Antibiotic per pulmonology. 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      37 minutes in total care time    Electronically signed by Adeola oRthman MD on 3/15/2023 at 1:23 PM

## 2023-03-15 NOTE — PROGRESS NOTES
1800: Spoke with patient's wife via phone. Informed patient of plan to transfer patient to [facility]. Patient reports he feels lonely, informed patient's wife. Patient has not had any visitors today. Patient alert to person and place. Patient's wife states she is going to attempt to visit tomorrow. Patient and his wife aware we are still awaiting acceptance and bed assignment at [facility].

## 2023-03-15 NOTE — FLOWSHEET NOTE
Awaiting response from attending on call.        03/15/23 0559 03/15/23 0608   Vitals   Heart Rate 75 75   BP (!) 96/58 (!) 92/56   MAP (Calculated) 71 68   MAP (mmHg) 68 (!) 64   Oxygen Therapy   SpO2  --  100 %

## 2023-03-15 NOTE — PLAN OF CARE
Nutrition Problem #1: Inadequate oral intake  Intervention: Food and/or Nutrient Delivery: Continue Current Diet, Start Oral Nutrition Supplement (Continue diet dysphagia soft and bite sized  Start standard oral nutrition supplement TID)

## 2023-03-15 NOTE — CARE COORDINATION
Pt is able to go to [facility] when medically clear. No need for Pre-cert. Will need [id number] and SHINE signed. LSW will follow. Electronically signed by MANDEEP Huber on 3/15/2023 at 8:32 AM    LSW spoke with pt's wife via phone. Wife agree with DC plan for pt to return to [facility]. Denies any needs. LSW will follow. Electronically signed by MANDEEP Huber on 3/15/2023 at 11:53 AM    [facility] transfer. Waiting for bed. Notify Karen at [facility]. LSW will follow.      Electronically signed by MANDEEP Huber on 3/15/2023 at 12:08 PM

## 2023-03-15 NOTE — PROGRESS NOTES
[facility] Respiratory Therapy Evaluation   Current Order:  ALBUTEROL MDI QID AND Q2 PRN       Home Regimen: QID       Ordering Physician: Lucas Bundy  Re-evaluation Date:  3/18/23     Diagnosis: ACUTE RESP FAILURE       Patient Status: Stable / Unstable + Physician notified    The following MDI Criteria must be met in order to convert aerosol to MDI with spacer.  If unable to meet, MDI will be converted to aerosol:  []  Patient able to demonstrate the ability to use MDI effectively  []  Patient alert and cooperative  []  Patient able to take deep breath with 5-10 second hold  []  Medication(s) available in this delivery method   []  Peak flow greater than or equal to 200 ml/min            Current Order Substituted To  (same drug, same frequency)   Aerosol to MDI [] Albuterol Sulfate 0.083% unit dose by aerosol Albuterol Sulfate MDI 2 puffs by inhalation with spacer    [] Levalbuterol 1.25 mg unit dose by aerosol Levalbuterol MDI 2 puffs by inhalation with spacer    [] Levalbuterol 0.63 mg unit dose by aerosol Levalbuterol MDI 2 puffs by inhalation with spacer    [] Ipratropium Bromide 0.02% unit dose by aerosol Ipratropium Bromide MDI 2 puffs by inhalation with spacer    [] Duoneb (Ipratropium + Albuterol) unit dose by aerosol Ipratropium MDI + Albuterol MDI 2 puffs by inhalation w/spacer   MDI to Aerosol [] Albuterol Sulfate MDI Albuterol Sulfate 0.083% unit dose by aerosol    [] Levalbuterol MDI 2 puffs by inhalation Levalbuterol 1.25 mg unit dose by aerosol    [] Ipratropium Bromide MDI by inhalation Ipratropium Bromide 0.02% unit dose by aerosol    [] Combivent (Ipratropium + Albuterol) MDI by inhalation Duoneb (Ipratropium + Albuterol) unit dose by aerosol       Treatment Assessment [Frequency/Schedule]:  Change frequency to: __________NO CHANGES TO HOME FREQ________________________________________per Protocol, P&T, MEC      Points 0 1 2 3 4   Pulmonary Status  Non-Smoker  []   Smoking history   < 20 pack years  []   Smoking history  ?  20 pack years  []   Pulmonary Disorder  (acute or chronic)  [x]   Severe or Chronic w/ Exacerbation  []     Surgical Status No [x]   Surgeries     General []   Surgery Lower []   Abdominal Thoracic or []   Upper Abdominal Thoracic with  PulmonaryDisorder  []     Chest X-ray Clear/Not  Ordered     []  Chronic Changes  Results Pending  []  Infiltrates, atelectasis, pleural effusion, or edema  [x]  Infiltrates in more than one lobe []  Infiltrate + Atelectasis, &/or pleural effusion  []    Respiratory Pattern Regular,  RR = 12-20 [x]  Increased,  RR = 21-25 []  HUERTA, irregular,  or RR = 26-30 []  Decreased FEV1  or RR = 31-35 []  Severe SOB, use  of accessory muscles, or RR ? 35  []    Mental Status Alert, oriented,  Cooperative [x]  Confused but Follows commands []  Lethargic or unable to follow commands []  Obtunded  []  Comatose  []    Breath Sounds Clear to  auscultation  []  Decreased unilaterally or  in bases only [x]  Decreased  bilaterally  []  Crackles or intermittent wheezes []  Wheezes []    Cough Strong, Spontan., & nonproductive [x]  Strong,  spontaneous, &  productive []  Weak,  Nonproductive []  Weak, productive or  with wheezes []  No spontaneous  cough or may require suctioning []    Level of Activity Ambulatory []  Ambulatory w/ Assist  [x]  Non-ambulatory []  Paraplegic []  Quadriplegic []    Total    Score:___7____     Triage Score:____4____      Tri       Triage:     1. (>20) Freq: Q3    2. (16-20) Freq: Q4   3. (11-15) Freq: QID & Albuterol Q2 PRN    4. (6-10) Freq: TID & Albuterol Q2 PRN    5. (0-5) Freq Q4prn

## 2023-03-16 LAB
ANION GAP SERPL CALCULATED.3IONS-SCNC: 7 MEQ/L (ref 9–15)
BUN SERPL-MCNC: 16 MG/DL (ref 8–23)
CALCIUM SERPL-MCNC: 8.3 MG/DL (ref 8.5–9.9)
CHLORIDE SERPL-SCNC: 110 MEQ/L (ref 95–107)
CO2 SERPL-SCNC: 29 MEQ/L (ref 20–31)
CREAT SERPL-MCNC: 0.59 MG/DL (ref 0.7–1.2)
GLUCOSE SERPL-MCNC: 97 MG/DL (ref 70–99)
POTASSIUM SERPL-SCNC: 3.5 MEQ/L (ref 3.4–4.9)
SODIUM SERPL-SCNC: 146 MEQ/L (ref 135–144)
VANCOMYCIN SERPL-MCNC: 19.4 UG/ML (ref 10–40)

## 2023-03-16 PROCEDURE — 6360000002 HC RX W HCPCS: Performed by: INTERNAL MEDICINE

## 2023-03-16 PROCEDURE — 2700000000 HC OXYGEN THERAPY PER DAY

## 2023-03-16 PROCEDURE — 80202 ASSAY OF VANCOMYCIN: CPT

## 2023-03-16 PROCEDURE — 1210000000 HC MED SURG R&B

## 2023-03-16 PROCEDURE — 6370000000 HC RX 637 (ALT 250 FOR IP): Performed by: INTERNAL MEDICINE

## 2023-03-16 PROCEDURE — 94761 N-INVAS EAR/PLS OXIMETRY MLT: CPT

## 2023-03-16 PROCEDURE — 99232 SBSQ HOSP IP/OBS MODERATE 35: CPT | Performed by: INTERNAL MEDICINE

## 2023-03-16 PROCEDURE — 94640 AIRWAY INHALATION TREATMENT: CPT

## 2023-03-16 PROCEDURE — 2580000003 HC RX 258: Performed by: INTERNAL MEDICINE

## 2023-03-16 PROCEDURE — 80048 BASIC METABOLIC PNL TOTAL CA: CPT

## 2023-03-16 PROCEDURE — 36415 COLL VENOUS BLD VENIPUNCTURE: CPT

## 2023-03-16 RX ORDER — LANOLIN ALCOHOL/MO/W.PET/CERES
400 CREAM (GRAM) TOPICAL DAILY
Status: DISCONTINUED | OUTPATIENT
Start: 2023-03-16 | End: 2023-03-18 | Stop reason: HOSPADM

## 2023-03-16 RX ORDER — ENOXAPARIN SODIUM 100 MG/ML
40 INJECTION SUBCUTANEOUS 2 TIMES DAILY
Status: DISCONTINUED | OUTPATIENT
Start: 2023-03-16 | End: 2023-03-18 | Stop reason: HOSPADM

## 2023-03-16 RX ORDER — METOPROLOL SUCCINATE 25 MG/1
25 TABLET, EXTENDED RELEASE ORAL DAILY
Status: DISCONTINUED | OUTPATIENT
Start: 2023-03-16 | End: 2023-03-18 | Stop reason: HOSPADM

## 2023-03-16 RX ORDER — POTASSIUM CHLORIDE 20 MEQ/1
40 TABLET, EXTENDED RELEASE ORAL ONCE
Status: COMPLETED | OUTPATIENT
Start: 2023-03-16 | End: 2023-03-16

## 2023-03-16 RX ADMIN — POTASSIUM CHLORIDE 40 MEQ: 1500 TABLET, EXTENDED RELEASE ORAL at 10:28

## 2023-03-16 RX ADMIN — ALBUTEROL SULFATE 2 PUFF: 90 AEROSOL, METERED RESPIRATORY (INHALATION) at 20:10

## 2023-03-16 RX ADMIN — GUAIFENESIN 600 MG: 600 TABLET, EXTENDED RELEASE ORAL at 20:55

## 2023-03-16 RX ADMIN — ATORVASTATIN CALCIUM 80 MG: 80 TABLET, FILM COATED ORAL at 20:55

## 2023-03-16 RX ADMIN — METHOCARBAMOL 750 MG: 500 TABLET ORAL at 18:31

## 2023-03-16 RX ADMIN — ASPIRIN 81 MG: 81 TABLET, COATED ORAL at 10:28

## 2023-03-16 RX ADMIN — BENZONATATE 100 MG: 100 CAPSULE ORAL at 20:55

## 2023-03-16 RX ADMIN — Medication 3 MG: at 20:55

## 2023-03-16 RX ADMIN — VANCOMYCIN HYDROCHLORIDE 1000 MG: 1 INJECTION, POWDER, LYOPHILIZED, FOR SOLUTION INTRAVENOUS at 06:01

## 2023-03-16 RX ADMIN — ALBUTEROL SULFATE 2 PUFF: 90 AEROSOL, METERED RESPIRATORY (INHALATION) at 11:32

## 2023-03-16 RX ADMIN — Medication 10 ML: at 10:29

## 2023-03-16 RX ADMIN — METHOCARBAMOL 750 MG: 500 TABLET ORAL at 20:55

## 2023-03-16 RX ADMIN — PANTOPRAZOLE SODIUM 40 MG: 40 TABLET, DELAYED RELEASE ORAL at 10:28

## 2023-03-16 RX ADMIN — SERTRALINE HYDROCHLORIDE 75 MG: 50 TABLET ORAL at 10:28

## 2023-03-16 RX ADMIN — VANCOMYCIN HYDROCHLORIDE 1000 MG: 1 INJECTION, POWDER, LYOPHILIZED, FOR SOLUTION INTRAVENOUS at 18:37

## 2023-03-16 RX ADMIN — ALBUTEROL SULFATE 2 PUFF: 90 AEROSOL, METERED RESPIRATORY (INHALATION) at 07:12

## 2023-03-16 RX ADMIN — METOPROLOL SUCCINATE 25 MG: 25 TABLET, EXTENDED RELEASE ORAL at 10:28

## 2023-03-16 RX ADMIN — GUAIFENESIN AND DEXTROMETHORPHAN 5 ML: 100; 10 SYRUP ORAL at 20:56

## 2023-03-16 RX ADMIN — SODIUM CHLORIDE, PRESERVATIVE FREE 10 ML: 5 INJECTION INTRAVENOUS at 05:59

## 2023-03-16 RX ADMIN — FINASTERIDE 5 MG: 5 TABLET, FILM COATED ORAL at 10:28

## 2023-03-16 RX ADMIN — Medication 400 MG: at 10:28

## 2023-03-16 RX ADMIN — CLONAZEPAM 0.25 MG: 0.5 TABLET ORAL at 18:32

## 2023-03-16 RX ADMIN — ENOXAPARIN SODIUM 40 MG: 100 INJECTION SUBCUTANEOUS at 20:54

## 2023-03-16 RX ADMIN — TAMSULOSIN HYDROCHLORIDE 0.4 MG: 0.4 CAPSULE ORAL at 20:55

## 2023-03-16 RX ADMIN — METHOCARBAMOL 750 MG: 500 TABLET ORAL at 10:28

## 2023-03-16 RX ADMIN — Medication 10 ML: at 20:54

## 2023-03-16 RX ADMIN — ENOXAPARIN SODIUM 40 MG: 100 INJECTION SUBCUTANEOUS at 10:28

## 2023-03-16 RX ADMIN — GUAIFENESIN 600 MG: 600 TABLET, EXTENDED RELEASE ORAL at 10:28

## 2023-03-16 RX ADMIN — EZETIMIBE 10 MG: 10 TABLET ORAL at 20:55

## 2023-03-16 RX ADMIN — ALBUTEROL SULFATE 2 PUFF: 90 AEROSOL, METERED RESPIRATORY (INHALATION) at 15:30

## 2023-03-16 ASSESSMENT — ENCOUNTER SYMPTOMS
STRIDOR: 0
GASTROINTESTINAL NEGATIVE: 1
BLOOD IN STOOL: 0
NAUSEA: 0
SHORTNESS OF BREATH: 1
EYES NEGATIVE: 1
COUGH: 1
WHEEZING: 0
CHEST TIGHTNESS: 0

## 2023-03-16 NOTE — PROGRESS NOTES
Progress Note      Patient Name: Bob Alaniz Date: 3/8/2023  3:27 PM  MR #: [id number]  : 1945    Attending Physician: Laura Geiger MD  Reason for consult: Elevated BNP and Troponin    History of Presenting Illness:      Mireya Quigley is a 76 y.o. male on hospital day 8 with a history of . History Obtained From:  patient, electronic medical record    Admitted w respiratory failure. Dx w COVID 6 days ago. Pt is coughing up very thick yellow sputum. He has significant cough. He is SOB. He has been at [facility] and transferred for worse respiratory distress. 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    He had CABG. 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 Was on Warfarin but due to inability to maintain therapeutic INR was change to Eliquis. He sees [facility]io. He has a PPM for CHB. He has Mild LE Edema and Lung sounds are course and diminished. 3/10/23  no fever. Continues to cough up thick yellow sputum. Having coughing fits. On 4L O2 sats 92-99%   Hypotensive early in am.     3/13/23 Had Hemoptysis. Hb 9.1 and stable No Fever on 3L O2. Still wet course cough. C/o Panic attack but appears comfortable to me.     3/14/23 no fever on 3L O2. Somnolent. Coughing denies pain. 3/15/23 Somnolent. Slow speech no fever. Coughing fits. No acute events on 2.5 L O2    3/16/23 He had NSVT yesterday. We gave iv Mag and iv K.  He is very somnolent this am no acute events overnight  History:      EKG:  V Paced 80 w underlying SR  Past Medical History:   Diagnosis Date    Atrial fibrillation ([facility] [address]     Benign prostate hyperplasia     Hyperlipemia     Kidney disease     Prostate cancer ([facility] [address]     Sleep apnea     Stroke [facility])     Urinary retention      Past Surgical History:   Procedure Laterality Date    BRONCHOSCOPY N/A 3/10/2023    BRONCHOSCOPY AIRWAY CLEARANCE BRONCHOALVEOLAR performed by Avril Jackson MD at [place] [address] N/A        Family History  History reviewed. No pertinent family history.  [] Unable to obtain due to ventilated and/ or neurologic status    Social History     Socioeconomic History    Marital status:      Spouse name: Not on file    Number of children: Not on file    Years of education: Not on file    Highest education level: Not on file   Occupational History    Not on file   Tobacco Use    Smoking status: Never    Smokeless tobacco: Never   Substance and Sexual Activity    Alcohol use: Never    Drug use: Never    Sexual activity: Not on file   Other Topics Concern    Not on file   Social History Narrative    Not on file     Social Determinants of Health     Financial Resource Strain: Not on file   Food Insecurity: Not on file   Transportation Needs: Not on file   Physical Activity: Not on file   Stress: Not on file   Social Connections: Not on file   Intimate Partner Violence: Not on file   Housing Stability: Not on file      [] Unable to obtain due to ventilated and/ or neurologic status      Home Medications:      Medications Prior to Admission: Aspirin 81 MG CAPS, Take 81 mg by mouth daily  fenofibrate (TRICOR) 145 MG tablet, Take 145 mg by mouth  ferrous sulfate 324 (65 Fe) MG EC tablet, Take 324 mg by mouth  gabapentin (NEURONTIN) 600 MG tablet, Take 600 mg by mouth in the morning, at noon, and at bedtime.  magnesium hydroxide (MILK OF MAGNESIA) 400 MG/5ML suspension, Take 30 mLs by mouth daily  Pantoprazole Sodium (PROTONIX PO), Take 40 mg by mouth daily  sertraline (ZOLOFT) 25 MG tablet, Take 75 mg by mouth daily  Vitamin D, Ergocalciferol, 72556 units CAPS, Take 1 tablet by mouth daily  acetaminophen (TYLENOL) 500 MG tablet, Take 2 tablets by mouth every 8 hours as needed  atorvastatin (LIPITOR) 40 MG tablet, Take 1 tablet by mouth daily  enoxaparin (LOVENOX) 40 MG/0.4ML, Inject 40 mg into the skin daily  ezetimibe (ZETIA) 10 MG tablet, Take 10 mg by mouth at bedtime  finasteride (PROSCAR) 5 MG tablet, Take 5 mg by mouth  daily  guaiFENesin (MUCINEX) 600 MG extended release tablet, Take 1 tablet by mouth in the morning and at bedtime  HYDROcodone-acetaminophen (NORCO) 5-325 MG per tablet, Take 1 tablet by mouth every 6 hours as needed.   ipratropium-albuterol (DUONEB) 0.5-2.5 (3) MG/3ML SOLN nebulizer solution, Inhale 1 vial into the lungs 4 times daily  levoFLOXacin (LEVAQUIN) 750 MG tablet, Take 1 tablet by mouth daily  melatonin 3 MG TABS tablet, Take 1 tablet by mouth at bedtime  mirabegron (MYRBETRIQ) 50 MG TB24, Take 50 mg by mouth daily  predniSONE (DELTASONE) 20 MG tablet, Take 40 mg by mouth daily  tamsulosin (FLOMAX) 0.4 MG capsule, Take 0.4 mg by mouth at bedtime  methocarbamol (ROBAXIN) 750 MG tablet, Take 750 mg by mouth 3 times daily  furosemide (LASIX) 40 MG tablet, Take 40 mg by mouth daily  tadalafil (CIALIS) 5 MG tablet, Take 5 mg by mouth daily  [DISCONTINUED] Methocarbamol 1000 MG TABS, Take 750 mg by mouth 3 times daily    Current Hospital Medications:     Scheduled Meds:   potassium chloride  40 mEq Oral Once    metoprolol succinate  25 mg Oral Daily    magnesium oxide  400 mg Oral Daily    vancomycin (VANCOCIN) intermittent dosing (placeholder)   Other RX Placeholder    vancomycin  1,000 mg IntraVENous Q12H    furosemide  40 mg IntraVENous Once    aspirin  81 mg Oral Daily    atorvastatin  80 mg Oral Nightly    ezetimibe  10 mg Oral Nightly    finasteride  5 mg Oral Daily    melatonin  3 mg Oral Nightly    methocarbamol  750 mg Oral TID    pantoprazole  40 mg Oral Daily    sertraline  75 mg Oral Daily    tamsulosin  0.4 mg Oral Nightly    clonazePAM  0.25 mg Oral Q12H    sodium chloride  1,000 mL IntraVENous Once    sodium chloride flush  5-40 mL IntraVENous 2 times per day    guaiFENesin  600 mg Oral BID    albuterol sulfate HFA  2 puff Inhalation 4x daily     Continuous Infusions:   sodium chloride       PRN Meds:.hydrOXYzine HCl, albuterol sulfate HFA, trimethobenzamide, sodium chloride flush, sodium chloride, polyethylene glycol, acetaminophen **OR** acetaminophen, benzonatate, guaiFENesin-dextromethorphan  .   sodium chloride          Allergies: Allergies   Allergen Reactions    Penicillins     Sulfamethoxazole Hives    Tapentadol Hives    Trimethoprim Hives        Review of Systems:       Review of Systems   Constitutional:  Positive for fatigue. Negative for diaphoresis. HENT: Negative. Eyes: Negative. Respiratory:  Positive for cough and shortness of breath. Negative for chest tightness, wheezing and stridor. Cardiovascular: Negative. Negative for chest pain, palpitations and leg swelling. Gastrointestinal: Negative. Negative for blood in stool and nausea. Genitourinary: Negative. Musculoskeletal: Negative. Skin: Negative. Neurological:  Positive for weakness. Negative for dizziness, syncope and light-headedness. Hematological: Negative. Psychiatric/Behavioral: Negative. Objective Findings:     Vitals:/69   Pulse 89   Temp 98.6 °F (37 °C) (Oral)   Resp 16   Ht 5' 11\" (1.803 m)   Wt 178 lb (80.7 kg)   SpO2 98%   BMI 24.83 kg/m²      Physical Examination:    Physical Exam   Constitutional: No distress. He appears chronically ill and acutely ill. HENT:   Normal cephalic and Atraumatic   Eyes: Pupils are equal, round, and reactive to light. Neck: Thyroid normal. No JVD present. No neck adenopathy. No thyromegaly present. Cardiovascular: Regular rhythm, intact distal pulses and normal pulses. Tachycardia present. Murmur heard. Pulmonary/Chest: Effort normal. He has scattered wheezes. He exhibits no tenderness. Course BS   Abdominal: Soft. Bowel sounds are normal. There is no abdominal tenderness. Musculoskeletal:         General: Edema (trace) present. No tenderness. Normal range of motion. Cervical back: Normal range of motion and neck supple. Neurological: He is alert and oriented to person, place, and time. Skin: Skin is warm.  No cyanosis. Nails show no clubbing. Results/ Medications reviewed 3/16/2023, 8:20 AM     Laboratory, Microbiology, Pathology, Radiology, Cardiology, Medications and Transcriptions reviewed  Scheduled Meds:   potassium chloride  40 mEq Oral Once    metoprolol succinate  25 mg Oral Daily    magnesium oxide  400 mg Oral Daily    vancomycin (VANCOCIN) intermittent dosing (placeholder)   Other RX Placeholder    vancomycin  1,000 mg IntraVENous Q12H    furosemide  40 mg IntraVENous Once    aspirin  81 mg Oral Daily    atorvastatin  80 mg Oral Nightly    ezetimibe  10 mg Oral Nightly    finasteride  5 mg Oral Daily    melatonin  3 mg Oral Nightly    methocarbamol  750 mg Oral TID    pantoprazole  40 mg Oral Daily    sertraline  75 mg Oral Daily    tamsulosin  0.4 mg Oral Nightly    clonazePAM  0.25 mg Oral Q12H    sodium chloride  1,000 mL IntraVENous Once    sodium chloride flush  5-40 mL IntraVENous 2 times per day    guaiFENesin  600 mg Oral BID    albuterol sulfate HFA  2 puff Inhalation 4x daily     Continuous Infusions:   sodium chloride         Recent Labs     03/14/23  0647 03/15/23  0503   WBC 9.2 8.0   HGB 9.1* 8.7*   HCT 28.2* 27.9*   MCV 91.4 90.4    213       Recent Labs     03/14/23  0944 03/15/23  0503 03/16/23  0525    142 146*   K 3.1* 2.9* 3.5    108* 110*   CO2 32* 30 29   BUN 23 19 16   CREATININE 0.67* 0.61* 0.59*       No results for input(s): AST, ALT, ALB, BILIDIR, BILITOT, ALKPHOS in the last 72 hours. No results for input(s): LIPASE, AMYLASE in the last 72 hours. No results for input(s): PROT, INR in the last 72 hours. CTA CHEST W WO CONTRAST PE Eval    Result Date: 3/8/2023  EXAMINATION: CTA OF THE CHEST WITH AND WITHOUT CONTRAST 3/8/2023 5:33 pm TECHNIQUE: CTA of the chest was performed before and after the administration of intravenous contrast.  Multiplanar reformatted images are provided for review. MIP images are provided for review.  Automated exposure control, iterative reconstruction, and/or weight based adjustment of the mA/kV was utilized to reduce the radiation dose to as low as reasonably achievable. COMPARISON: None. HISTORY: ORDERING SYSTEM PROVIDED HISTORY: PE TECHNOLOGIST PROVIDED HISTORY: Reason for exam:->PE Decision Support Exception - unselect if not a suspected or confirmed emergency medical condition->Emergency Medical Condition (MA) What reading provider will be dictating this exam?->CRC FINDINGS: Pulmonary Arteries: Pulmonary arteries are adequately opacified for evaluation. No evidence of intraluminal filling defect to suggest pulmonary embolism. Main pulmonary artery is mildly prominent. Pulmonary arterial hypertension cannot be excluded. . Mediastinum: No evidence of mediastinal lymphadenopathy. The heart and pericardium demonstrate no acute abnormality. There is no acute abnormality of the thoracic aorta. There is mild aneurysmal dilatation of the ascending aorta which measures 4 cm. Lungs/pleura: 5 mm right upper lobe pulmonary nodule is noted (axial image 22). Atelectasis versus scarring is noted in the right lower lobe. There is right pleural calcification and pleural thickening which may be due to asbestos related pleural disease. There is volume loss in the left lung with mediastinal shift to the left. Consolidated left lung may reflect atelectasis or chronic scarring. There is pleural calcification and thickening in the left hemithorax which may rib reflect asbestos related pleural disease or chronic changes from remote hematoma or granulomatous disease such as tuberculosis. Upper Abdomen: Low-density lesions in the right kidney are likely cysts. 9 mm calcification in the left kidney and 1 cm calcification in the right kidney are consistent with renal calculi. Small calcified gallstones are noted. Soft Tissues/Bones: Post sternotomy changes are noted. Chronic T12 compression deformity is noted.   There is fusion hardware in the visualized upper lumbar spine. 1. Negative study for pulmonary embolism. 2. Chronic scarring versus atelectasis in the left lung. 3. Bilateral pleural calcifications which may be due to asbestos related pleural disease, chronic hematomas or granulomatous disease. 4. Prominence of the main pulmonary arteries which may indicate pulmonary arterial hypertension. 5. Bilateral nephrolithiasis. 6. Cholelithiasis. 7. 5 mm right upper lobe pulmonary nodule. RECOMMENDATIONS: Fleischner Society guidelines for follow-up and management of incidentally detected pulmonary nodules: Single Solid Nodule: Nodule size less than 6 mm In a low-risk patient, no routine follow-up. In a high-risk patient, optional CT at 12 months. - Low risk patients include individuals with minimal or absent history of smoking and other known risk factors. - High risk patients include individuals with a history or smoking or known risk factors. Radiology 2017 http://pubs. rsna.org/doi/full/10.1148/radiol. 3459702113     XR CHEST PORTABLE    Result Date: 3/8/2023  EXAMINATION: ONE XRAY VIEW OF THE CHEST 3/8/2023 4:47 pm COMPARISON: None. HISTORY: ORDERING SYSTEM PROVIDED HISTORY: sob TECHNOLOGIST PROVIDED HISTORY: Reason for exam:->sob What reading provider will be dictating this exam?->CRC FINDINGS: The lungs are without acute focal process. Small right pleural effusion. Probable left pleural effusion. No pneumothorax. Cardiomegaly. Left-sided transvenous pacer device, prosthetic heart valve and sternotomy wires noted. The osseous structures are without acute process. Right pleural effusion. Probable left pleural effusion. Cardiomegaly.        Active Hospital Problems    Diagnosis Date Noted    Collapse of left lung [J98.11] 03/13/2023     Priority: Medium    COVID-19 [U07.1] 03/13/2023     Priority: Medium    Hypoxia [R09.02] 03/09/2023     Priority: Medium    Acute respiratory failure with hypoxia ([address] [J96.01] 03/08/2023     Priority: Medium Impression/Plan:   COVID PNA - significant cough and  sputum - S/P Bronch and BAL. PAF - in SR today. A/C on hold due to Hemoptysis. Appears stable. Will need to resume at least Lower dose Lovenox for now. We will order. He will need full dose DOAC when appropriate  BIO MVR and AVR post IE   Echo EF 10%  BioMVR w Moderate MS  BioAVR w severe AS  CABG- add ASA and statin. BB when BP more stable. HPL Statin low fat diet  HTN Stable   PPM CHB   C/o Panic Attack   Severe Hypokalemia- replace iv now. 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 currently can not adjust meds due to low BP. We will add low kelly BB today given NSVT. NSVT - additional K and Mag today, Daily Mag Oxide. Keep on Telemetry  Tertiary care transfer in process. Thank you for allowing us to participate in the care of this patient. Will continue to follow. Please call if questions or concerns arise.     Electronically signed by Thaddeus Camacho MD on 3/16/2023 at 8:20 AM

## 2023-03-16 NOTE — PROGRESS NOTES
Physician Progress Note    3/16/2023   12:17 PM    Name:  Mireya Quigley  MRN:    [id number]     IP Day: 8     Admit Date: 3/8/2023  3:27 PM  PCP: Liliane Leon MD    Code Status:  Full Code    Subjective:     He still has a significant cough with shortness of breath still    Current Facility-Administered Medications   Medication Dose Route Frequency Provider Last Rate Last Admin    metoprolol succinate (TOPROL XL) extended release tablet 25 mg  25 mg Oral Daily Keena Au MD   25 mg at 03/16/23 1028    magnesium oxide (MAG-OX) tablet 400 mg  400 mg Oral Daily Keena Au MD   400 mg at 03/16/23 1028    enoxaparin (LOVENOX) injection 40 mg  40 mg SubCUTAneous BID Keena Au MD   40 mg at 03/16/23 1028    vancomycin (VANCOCIN) intermittent dosing (placeholder)   Other RX Placeholder Cesar Redman MD        vancomycin 1000 mg IVPB in 250 mL NS addavial  1,000 mg IntraVENous Q12H Cesar Redman MD   Stopped at 03/16/23 0729    hydrOXYzine HCl (ATARAX) tablet 25 mg  25 mg Oral Q6H PRN Mundo Oms, DO   25 mg at 03/15/23 2044    albuterol sulfate HFA (PROVENTIL;VENTOLIN;PROAIR) 108 (90 Base) MCG/ACT inhaler 2 puff  2 puff Inhalation Q2H PRN Mundo Oms, DO        furosemide (LASIX) injection 40 mg  40 mg IntraVENous Once Keena Au MD        aspirin EC tablet 81 mg  81 mg Oral Daily Keena Au MD   81 mg at 03/16/23 1028    atorvastatin (LIPITOR) tablet 80 mg  80 mg Oral Nightly Keena Au MD   80 mg at 03/15/23 2044    ezetimibe (ZETIA) tablet 10 mg  10 mg Oral Nightly [place] Oms, DO   10 mg at 03/15/23 2044    finasteride (PROSCAR) tablet 5 mg  5 mg Oral Daily Mundo Oms, DO   5 mg at 03/16/23 1028    melatonin tablet 3 mg  3 mg Oral Nightly Mundo Oms, DO   3 mg at 03/15/23 2044    methocarbamol (ROBAXIN) tablet 750 mg  750 mg Oral TID Mundo Oms, DO   750 mg at 03/16/23 1028    pantoprazole (PROTONIX) tablet 40 mg  40 mg Oral Daily Mundo Randall DO   40 mg at 03/16/23 1028    sertraline (ZOLOFT) tablet 75 mg  75 mg Oral Daily Peter R Diego, DO   75 mg at 23 1028    tamsulosin (FLOMAX) capsule 0.4 mg  0.4 mg Oral Nightly Peter R Diego, DO   0.4 mg at 03/15/23 2044    trimethobenzamide (TIGAN) injection 200 mg  200 mg IntraMUSCular Q6H PRN Peter R Diego, DO        clonazePAM (KLONOPIN) tablet 0.25 mg  0.25 mg Oral Q12H [place]as MD   0.25 mg at 03/15/23 1606    0.9 % sodium chloride bolus  1,000 mL IntraVENous Once Peter R Diego, DO   Held at 23 1628    sodium chloride flush 0.9 % injection 5-40 mL  5-40 mL IntraVENous 2 times per day Peter R Diego, DO   10 mL at 23 1029    sodium chloride flush 0.9 % injection 5-40 mL  5-40 mL IntraVENous PRN Peter R Diego, DO   10 mL at 23 0559    0.9 % sodium chloride infusion   IntraVENous PRN Peter R Diego, DO        polyethylene glycol (GLYCOLAX) packet 17 g  17 g Oral Daily PRN Peter R Diego, DO        acetaminophen (TYLENOL) tablet 650 mg  650 mg Oral Q6H PRN Peter R Diego, DO   650 mg at 03/15/23 1200    Or    acetaminophen (TYLENOL) suppository 650 mg  650 mg Rectal Q6H PRN Peter R Diego, DO        guaiFENesin (MUCINEX) extended release tablet 600 mg  600 mg Oral BID Peter R Diego, DO   600 mg at 23 1028    benzonatate (TESSALON) capsule 100 mg  100 mg Oral TID PRN Peter R Diego, DO   100 mg at 03/15/23 2044    guaiFENesin-dextromethorphan (ROBITUSSIN DM) 100-10 MG/5ML syrup 5 mL  5 mL Oral Q4H PRN Peter R Diego, DO   5 mL at 03/15/23 2043    albuterol sulfate HFA (PROVENTIL;VENTOLIN;PROAIR) 108 (90 Base) MCG/ACT inhaler 2 puff  2 puff Inhalation 4x daily Peter DURAN Diego, DO   2 puff at 23 1132       Physical Examination:      Vitals:  /69   Pulse 89   Temp 98.6 °F (37 °C) (Oral)   Resp 16   Ht 5' 11\" (1.803 m)   Wt 178 lb (80.7 kg)   SpO2 96%   BMI 24.83 kg/m²   Temp (24hrs), Av.9 °F (36.6 °C), Min:97.5 °F (36.4 °C), Max:98.6 °F (37 °C)    General  appearance: alert, cooperative and no distress. Elderly and chronically ill-appearing but able to have normal conversation. Oriented to person, hospital.  He thought month was February  Lungs: Rhonchi appreciated  Heart: regular rate and rhythm, no murmur  Abdomen: soft, nontender, nondistended, bowel sounds present, no masses  Extremities: No pitting edema. No redness, tenderness in the calves. Cap refill <2s  Skin: no gross lesions, rashes    Data:     Labs:  Recent Labs     03/14/23  0647 03/15/23  0503   WBC 9.2 8.0   HGB 9.1* 8.7*    213       Recent Labs     03/15/23  0503 03/16/23  0525    146*   K 2.9* 3.5   * 110*   CO2 30 29   BUN 19 16   CREATININE 0.61* 0.59*   GLUCOSE 84 97       No results for input(s): AST, ALT, ALB, BILITOT, ALKPHOS in the last 72 hours. Assessment and Plan:        1. Acute respiratory failure with hypoxia suspect primarily due to atelectasis related to mucous plugging, with superimposed Staph aureus pneumonia. S/p bronchoscopy on 3/10 with thick white secretions noted throughout tracheobronchial tree worse on the left with complete obstruction of left mainstem bronchus. Mucosa easily bleeds on suction trauma. Secretions were washed and cleaned  -Completed course of steroids  -Antibiotic per pulmonology. 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      37 minutes in total care time    Electronically signed by Konrad Collado MD on 3/16/2023 at 12:17 PM

## 2023-03-16 NOTE — PROGRESS NOTES
Shift assessment complete. VSS. Pt is AxOx2-3. Pt has flat affect. He responds to voice. Took his pills crushed in pudding. Able to take a few sips of ginger ale. Pt bathed and repositioned. Call light within reach. Will continue to monitor.      Electronically signed by Nely Art RN on 3/16/2023 at 11:01 AM

## 2023-03-16 NOTE — PROGRESS NOTES
Pharmacy Vancomycin Consult     Vancomycin Day: 5  Current Dosing: vanco 1000mg IV q12h    Recent Labs     03/14/23  0647 03/14/23  0944 03/15/23  0503 03/16/23  0525   BUN  --    < > 19 16   CREATININE  --    < > 0.61* 0.59*   WBC 9.2  --  8.0  --     < > = values in this interval not displayed. Intake/Output Summary (Last 24 hours) at 3/16/2023 0628  Last data filed at 3/16/2023 0559  Gross per 24 hour   Intake 1440.29 ml   Output --   Net 1440.29 ml     Cultures  Recent Labs     03/15/23  1407 03/10/23  0921 03/08/23  1625   BC  --   --  No growth after 5 days of incubation. ORG  --  Staph aureus MRSA*  Staph aureus MRSA*  --    COVID19 DETECTED*  --   --      Height: 5' 11\" (180.3 cm), Weight: 178 lb (80.7 kg), Body mass index is 24.83 kg/m². Estimated Creatinine Clearance: 108 mL/min (A) (based on SCr of 0.59 mg/dL (L)). .    Trough:  Recent Labs     03/16/23  0525   VANCORANDOM 19.4      Assessment/Plan:  Data input into [employer] platform. Current dosing therapeutic for goal  trough 16.7. Monitor for LOT  Timing of future trough levels may be adjusted based on culture results, renal function, and clinical response.     Thank you,  Vishal Schuler, [facility] PharmD

## 2023-03-16 NOTE — PROGRESS NOTES
INPATIENT PROGRESS NOTES    PATIENT NAME: Louise Jackson  MRN: [id number]  SERVICE DATE:  March 16, 2023   SERVICE TIME:  1:07 PM      PRIMARY SERVICE: Pulmonary Disease    CHIEF COMPLAIN: Shortness of breath      INTERVAL HPI: Patient seen and examined at bedside, Interval Notes, orders reviewed. Nursing notes noted  He is on 3 L O2 via nasal cannula O2 saturation 96%. He denies having short of breath. No chest pain. He still has moist cough with thick yellow mucus. No fever or chills. No nausea vomiting or diarrhea. OBJECTIVE    Body mass index is 24.83 kg/m². PHYSICAL EXAM:  Vitals:  /69   Pulse 89   Temp 98.6 °F (37 °C) (Oral)   Resp 16   Ht 5' 11\" (1.803 m)   Wt 178 lb (80.7 kg)   SpO2 96%   BMI 24.83 kg/m²   General: Alert, awake . comfortable in bed, No distress. Head: Atraumatic , Normocephalic   Eyes: PERRL. No sclera icterus. No conjunctival injection. No discharge   ENT: No nasal  discharge. Pharynx clear. Neck:  Trachea midline. No thyromegaly, no JVD, No cervical adenopathy. Chest : Bilaterally symmetrical ,Normal effort,  No accessory muscle use  Lung : .Fair BS bilateral, decreased BS at bases. Bibasilar Rales. No wheezing. Bilateral scattered rhonchi. Heart[de-identified] Normal  rate. Regular rhythm. No mumur ,  Rub or gallop  ABD: Non-tender. Non-distended. No masses. No organmegaly. Normal bowel sounds. No hernia. Ext : Trace pitting both leg , No Cyanosis No clubbing  Neuro: no focal weakness          DATA:   Recent Labs     03/14/23  0647 03/15/23  0503   WBC 9.2 8.0   HGB 9.1* 8.7*   HCT 28.2* 27.9*   MCV 91.4 90.4    213     Recent Labs     03/15/23  0503 03/16/23  0525    146*   K 2.9* 3.5   * 110*   CO2 30 29   BUN 19 16   CREATININE 0.61* 0.59*   GLUCOSE 84 97   CALCIUM 8.3* 8.3*   LABGLOM >60.0 >60.0       MV Settings:     FiO2 : 50 %    No results for input(s): PHART, RGE3YCF, PO2ART, CFH3FJA, BEART, F0RDPVLJ in the last 72 hours.     O2 Device: Nasal cannula  O2 Flow Rate (L/min): 2 L/min    ADULT DIET; Dysphagia - Soft and Bite Sized  ADULT ORAL NUTRITION SUPPLEMENT; Breakfast, Lunch, Dinner; Standard High Calorie/High Protein Oral Supplement     MEDICATIONS during current hospitalization:    Continuous Infusions:   sodium chloride         Scheduled Meds:   metoprolol succinate  25 mg Oral Daily    magnesium oxide  400 mg Oral Daily    enoxaparin  40 mg SubCUTAneous BID    vancomycin (VANCOCIN) intermittent dosing (placeholder)   Other RX Placeholder    vancomycin  1,000 mg IntraVENous Q12H    furosemide  40 mg IntraVENous Once    aspirin  81 mg Oral Daily    atorvastatin  80 mg Oral Nightly    ezetimibe  10 mg Oral Nightly    finasteride  5 mg Oral Daily    melatonin  3 mg Oral Nightly    methocarbamol  750 mg Oral TID    pantoprazole  40 mg Oral Daily    sertraline  75 mg Oral Daily    tamsulosin  0.4 mg Oral Nightly    clonazePAM  0.25 mg Oral Q12H    sodium chloride  1,000 mL IntraVENous Once    sodium chloride flush  5-40 mL IntraVENous 2 times per day    guaiFENesin  600 mg Oral BID    albuterol sulfate HFA  2 puff Inhalation 4x daily       PRN Meds:hydrOXYzine HCl, albuterol sulfate HFA, trimethobenzamide, sodium chloride flush, sodium chloride, polyethylene glycol, acetaminophen **OR** acetaminophen, benzonatate, guaiFENesin-dextromethorphan    Radiology  CTA CHEST W WO CONTRAST PE Eval    Result Date: 3/8/2023  EXAMINATION: CTA OF THE CHEST WITH AND WITHOUT CONTRAST 3/8/2023 5:33 pm TECHNIQUE: CTA of the chest was performed before and after the administration of intravenous contrast.  Multiplanar reformatted images are provided for review. MIP images are provided for review. Automated exposure control, iterative reconstruction, and/or weight based adjustment of the mA/kV was utilized to reduce the radiation dose to as low as reasonably achievable. COMPARISON: None.  HISTORY: ORDERING SYSTEM PROVIDED HISTORY: PE TECHNOLOGIST PROVIDED HISTORY: Reason for exam:->PE Decision Support Exception - unselect if not a suspected or confirmed emergency medical condition->Emergency Medical Condition (MA) What reading provider will be dictating this exam?->CRC FINDINGS: Pulmonary Arteries: Pulmonary arteries are adequately opacified for evaluation. No evidence of intraluminal filling defect to suggest pulmonary embolism. Main pulmonary artery is mildly prominent. Pulmonary arterial hypertension cannot be excluded. . Mediastinum: No evidence of mediastinal lymphadenopathy. The heart and pericardium demonstrate no acute abnormality. There is no acute abnormality of the thoracic aorta. There is mild aneurysmal dilatation of the ascending aorta which measures 4 cm. Lungs/pleura: 5 mm right upper lobe pulmonary nodule is noted (axial image 22). Atelectasis versus scarring is noted in the right lower lobe. There is right pleural calcification and pleural thickening which may be due to asbestos related pleural disease. There is volume loss in the left lung with mediastinal shift to the left. Consolidated left lung may reflect atelectasis or chronic scarring. There is pleural calcification and thickening in the left hemithorax which may rib reflect asbestos related pleural disease or chronic changes from remote hematoma or granulomatous disease such as tuberculosis. Upper Abdomen: Low-density lesions in the right kidney are likely cysts. 9 mm calcification in the left kidney and 1 cm calcification in the right kidney are consistent with renal calculi. Small calcified gallstones are noted. Soft Tissues/Bones: Post sternotomy changes are noted. Chronic T12 compression deformity is noted. There is fusion hardware in the visualized upper lumbar spine. 1. Negative study for pulmonary embolism. 2. Chronic scarring versus atelectasis in the left lung.  3. Bilateral pleural calcifications which may be due to asbestos related pleural disease, chronic hematomas or granulomatous disease. 4. Prominence of the main pulmonary arteries which may indicate pulmonary arterial hypertension. 5. Bilateral nephrolithiasis. 6. Cholelithiasis. 7. 5 mm right upper lobe pulmonary nodule. RECOMMENDATIONS: Fleischner Society guidelines for follow-up and management of incidentally detected pulmonary nodules: Single Solid Nodule: Nodule size less than 6 mm In a low-risk patient, no routine follow-up. In a high-risk patient, optional CT at 12 months. - Low risk patients include individuals with minimal or absent history of smoking and other known risk factors. - High risk patients include individuals with a history or smoking or known risk factors. Radiology 2017 http://pubs. rsna.org/doi/full/10.1148/radiol. 1614769466     XR CHEST PORTABLE    Result Date: 3/12/2023  EXAMINATION: ONE XRAY VIEW OF THE CHEST 3/12/2023 2:52 pm COMPARISON: Chest series from March 11, 2023 HISTORY: ORDERING SYSTEM PROVIDED HISTORY: pneumonia TECHNOLOGIST PROVIDED HISTORY: Reason for exam:->pneumonia What reading provider will be dictating this exam?->CRC FINDINGS: Left anterior chest wall cardiac support device with stable position of distal leads. Midline sternotomy hardware and postsurgical changes consistent with prior CABG. Valve prosthesis. There appears to be mild bilateral lung hyperinflation and background coarsened interstitial markings. Persistent dense retrocardiac and left basilar opacities. Faint bilateral interstitial opacities. Possible left pleural effusion. No obvious pneumothorax. Osseous and thoracic soft tissue structures demonstrate no acute findings. Osseous mineralization is decreased. 1.  Stable chest series. No change in dense retrocardiac and left basilar opacities. No change in possible left pleural effusion nor the faint interstitial opacities. 2.  Background coarsened interstitial markings. Atherosclerotic disease, cardiomegaly, and postsurgical changes.      XR CHEST PORTABLE    Result Date: 3/11/2023  EXAMINATION: ONE XRAY VIEW OF THE CHEST 3/11/2023 9:14 am COMPARISON: 03/10/2023, CT 03/08/2023 HISTORY: ORDERING SYSTEM PROVIDED HISTORY: hypoxia TECHNOLOGIST PROVIDED HISTORY: Reason for exam:->hypoxia What reading provider will be dictating this exam?->CRC FINDINGS: Left-sided cardiac pacemaker. Median sternotomy and cardiac valve. Previous spine surgery. Left greater than right pleural effusions. Diffuse interstitial prominence suggesting component of pulmonary edema or atypical infection in the acute setting. Hazy right lung base opacity likely atelectasis or scar. Dense opacity in the left lung base. Improved aeration of the left upper and mid lung zone with some residual strandy opacity. Cardiomegaly. Atherosclerotic vascular calcifications. Dense opacity in the left lung base likely atelectasis or pneumonia. Diffuse interstitial prominence suggestive of atypical infection or edema possibly superimposed on chronic lung changes. Improved aeration of the left mid and upper lung zone with residual strandy opacity in this region likely representing atelectasis/scar or inflammatory disease. Left greater than right pleural effusions. Cardiomegaly. XR CHEST PORTABLE    Result Date: 3/10/2023  EXAMINATION: ONE XRAY VIEW OF THE CHEST 3/10/2023 5:39 am COMPARISON: March 8, 2023 HISTORY: ORDERING SYSTEM PROVIDED HISTORY: hypoxia, sob TECHNOLOGIST PROVIDED HISTORY: Reason for exam:->hypoxia, sob What reading provider will be dictating this exam?->CRC FINDINGS: Unchanged complete white out left hemithorax with leftward shift of the cardio mediastinum. Pacemaker, prosthetic valve and sternal wires noted. Right lung remains clear. No pneumothorax or effusion. 1. No acute disease. No change from March 8, 2023. RECOMMENDATION: Careful clinical correlation and follow up recommended.      XR CHEST PORTABLE    Result Date: 3/8/2023  EXAMINATION: ONE XRAY VIEW OF THE CHEST 3/8/2023 4:47 pm COMPARISON: None. HISTORY: ORDERING SYSTEM PROVIDED HISTORY: sob TECHNOLOGIST PROVIDED HISTORY: Reason for exam:->sob What reading provider will be dictating this exam?->CRC FINDINGS: The lungs are without acute focal process. Small right pleural effusion. Probable left pleural effusion. No pneumothorax. Cardiomegaly. Left-sided transvenous pacer device, prosthetic heart valve and sternotomy wires noted. The osseous structures are without acute process. Right pleural effusion. Probable left pleural effusion. Cardiomegaly. Bronchoscopy    Result Date: 3/10/2023  [facility] Patient: Jean Salinas MRN: [id number] : 1945 Account: [de-identified] Gender: Male Age: 76 Years Procedure: Bronchoscopy Date: 3/10/2023 Attending Physician: Yue Davila Indications:        -  Atelectasis of the left lower lobe Complications:        -  No immediate complications Estimated Blood Loss:        -  Estimated blood loss was minimal. Procedure:        - The scope was introduced through the mouth, via the endotracheal tube (the           patient was intubated for the procedure) and advanced to tracheobronchial tree. -  The patient tolerated the procedure well. -  The procedure was accomplished without difficulty. Findings:        - see note in epic please Impression:        -  Atelectasis of the left lower lobe        - see note in epic please        -  No specimens collected. Procedure Code(s):        - [id number], Bronchoscopy, rigid or flexible, including fluoroscopic guidance,           when performed; diagnostic, with cell washing, when performed (separate           procedure) Diagnosis Code(s):        - J98.11, Atelectasis       CPT(R) - 202 copyright [employer]. All Rights Reserved. The CPT codes, CCI edits and ICD codes generated are intended as suggestions       and were generated based on input data.   These codes are preliminary and upon        review may be revised to meet current compliance and payer requirements. The provider is responsible for the final determination of appropriate codes,       and modifiers. Scope Withdrawal Time:       00:00:02 SANTOS LEW This document has been electronically signed. Note Initiated:3/10/2023 Note Completed:3/10/2023 9:29 AM        IMPRESSION AND SUGGESTION:  Acute on chronic respiratory failure with hypoxia  Left lung collapse status post bronchoscopy with aspiration of tracheobronchial secretion. Staph aureus pneumonia  COVID-19 infection  Rule out like likely due to asbestos exposure  Bilateral pleural effusion  5 mm lung nodule    Continue O2 to keep saturation 90% or above. Continue  Aspiration precaution. Chest x-ray noted. Aspiration precaution. Bronchodilator therapy continue guaifenesin. Chest x-ray shows dense retrocardiac and left basilar opacity. Possible left pleural effusion. Continue present treatment plan. NOTE: This report was transcribed using voice recognition software. Every effort was made to ensure accuracy; however, inadvertent computerized transcription errors may be present.       Electronically signed by Sakshi Neil MD, FCCP on 3/16/2023 at 1:07 PM

## 2023-03-16 NOTE — CARE COORDINATION
Pt is transfer to [facility]. Waiting for bed. No bed as of this AM.   Notify Karen from [facility] pt will be Tx to [facility]. LSW will follow.      Electronically signed by MANDEEP Trevizo on 3/16/2023 at 8:36 AM

## 2023-03-17 LAB
ANION GAP SERPL CALCULATED.3IONS-SCNC: 5 MEQ/L (ref 9–15)
BUN SERPL-MCNC: 16 MG/DL (ref 8–23)
CALCIUM SERPL-MCNC: 8.8 MG/DL (ref 8.5–9.9)
CHLORIDE SERPL-SCNC: 113 MEQ/L (ref 95–107)
CO2 SERPL-SCNC: 26 MEQ/L (ref 20–31)
CREAT SERPL-MCNC: 0.6 MG/DL (ref 0.7–1.2)
GLUCOSE SERPL-MCNC: 114 MG/DL (ref 70–99)
POTASSIUM SERPL-SCNC: 4.2 MEQ/L (ref 3.4–4.9)
SODIUM SERPL-SCNC: 144 MEQ/L (ref 135–144)

## 2023-03-17 PROCEDURE — 6370000000 HC RX 637 (ALT 250 FOR IP): Performed by: INTERNAL MEDICINE

## 2023-03-17 PROCEDURE — 94761 N-INVAS EAR/PLS OXIMETRY MLT: CPT

## 2023-03-17 PROCEDURE — 99232 SBSQ HOSP IP/OBS MODERATE 35: CPT | Performed by: INTERNAL MEDICINE

## 2023-03-17 PROCEDURE — 2580000003 HC RX 258: Performed by: INTERNAL MEDICINE

## 2023-03-17 PROCEDURE — 6360000002 HC RX W HCPCS: Performed by: INTERNAL MEDICINE

## 2023-03-17 PROCEDURE — 36415 COLL VENOUS BLD VENIPUNCTURE: CPT

## 2023-03-17 PROCEDURE — 94640 AIRWAY INHALATION TREATMENT: CPT

## 2023-03-17 PROCEDURE — 2060000000 HC ICU INTERMEDIATE R&B

## 2023-03-17 PROCEDURE — 80048 BASIC METABOLIC PNL TOTAL CA: CPT

## 2023-03-17 RX ADMIN — VANCOMYCIN HYDROCHLORIDE 1000 MG: 1 INJECTION, POWDER, LYOPHILIZED, FOR SOLUTION INTRAVENOUS at 18:16

## 2023-03-17 RX ADMIN — ASPIRIN 81 MG: 81 TABLET, COATED ORAL at 10:26

## 2023-03-17 RX ADMIN — SODIUM CHLORIDE, PRESERVATIVE FREE 10 ML: 5 INJECTION INTRAVENOUS at 06:20

## 2023-03-17 RX ADMIN — PANTOPRAZOLE SODIUM 40 MG: 40 TABLET, DELAYED RELEASE ORAL at 10:26

## 2023-03-17 RX ADMIN — ENOXAPARIN SODIUM 40 MG: 100 INJECTION SUBCUTANEOUS at 21:31

## 2023-03-17 RX ADMIN — ATORVASTATIN CALCIUM 80 MG: 80 TABLET, FILM COATED ORAL at 21:30

## 2023-03-17 RX ADMIN — GUAIFENESIN 600 MG: 600 TABLET, EXTENDED RELEASE ORAL at 10:26

## 2023-03-17 RX ADMIN — FINASTERIDE 5 MG: 5 TABLET, FILM COATED ORAL at 10:26

## 2023-03-17 RX ADMIN — TAMSULOSIN HYDROCHLORIDE 0.4 MG: 0.4 CAPSULE ORAL at 21:31

## 2023-03-17 RX ADMIN — VANCOMYCIN HYDROCHLORIDE 1000 MG: 1 INJECTION, POWDER, LYOPHILIZED, FOR SOLUTION INTRAVENOUS at 06:21

## 2023-03-17 RX ADMIN — GUAIFENESIN 600 MG: 600 TABLET, EXTENDED RELEASE ORAL at 21:31

## 2023-03-17 RX ADMIN — Medication 10 ML: at 21:31

## 2023-03-17 RX ADMIN — EZETIMIBE 10 MG: 10 TABLET ORAL at 21:32

## 2023-03-17 RX ADMIN — ALBUTEROL SULFATE 2 PUFF: 90 AEROSOL, METERED RESPIRATORY (INHALATION) at 08:01

## 2023-03-17 RX ADMIN — Medication 400 MG: at 10:26

## 2023-03-17 RX ADMIN — SERTRALINE HYDROCHLORIDE 75 MG: 50 TABLET ORAL at 10:28

## 2023-03-17 RX ADMIN — ALBUTEROL SULFATE 2 PUFF: 90 AEROSOL, METERED RESPIRATORY (INHALATION) at 20:07

## 2023-03-17 RX ADMIN — Medication 10 ML: at 10:27

## 2023-03-17 RX ADMIN — ALBUTEROL SULFATE 2 PUFF: 90 AEROSOL, METERED RESPIRATORY (INHALATION) at 14:31

## 2023-03-17 RX ADMIN — ENOXAPARIN SODIUM 40 MG: 100 INJECTION SUBCUTANEOUS at 10:26

## 2023-03-17 RX ADMIN — METHOCARBAMOL 750 MG: 500 TABLET ORAL at 10:26

## 2023-03-17 RX ADMIN — METOPROLOL SUCCINATE 25 MG: 25 TABLET, EXTENDED RELEASE ORAL at 10:26

## 2023-03-17 ASSESSMENT — ENCOUNTER SYMPTOMS
EYES NEGATIVE: 1
WHEEZING: 0
BLOOD IN STOOL: 0
CHEST TIGHTNESS: 0
NAUSEA: 0
SHORTNESS OF BREATH: 1
COUGH: 1
GASTROINTESTINAL NEGATIVE: 1
STRIDOR: 0

## 2023-03-17 NOTE — PROGRESS NOTES
Progress Note      Patient Name: Jd Sterling Date: 3/8/2023  3:27 PM  MR #: [id number]  : 1945    Attending Physician: Sosa Delgado MD  Reason for consult: Elevated BNP and Troponin    History of Presenting Illness:      Byron Yen is a 76 y.o. male on hospital day 9 with a history of . History Obtained From:  patient, electronic medical record    Admitted w respiratory failure. Dx w COVID 6 days ago. Pt is coughing up very thick yellow sputum. He has significant cough. He is SOB. He has been at [facility] and transferred for worse respiratory distress. 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    He had CABG. 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 Was on Warfarin but due to inability to maintain therapeutic INR was change to Eliquis. He sees [facility]. He has a PPM for CHB. He has Mild LE Edema and Lung sounds are course and diminished. 3/10/23  no fever. Continues to cough up thick yellow sputum. Having coughing fits. On 4L O2 sats 92-99%   Hypotensive early in am.     3/13/23 Had Hemoptysis. Hb 9.1 and stable No Fever on 3L O2. Still wet course cough. C/o Panic attack but appears comfortable to me.     3/14/23 no fever on 3L O2. Somnolent. Coughing denies pain. 3/15/23 Somnolent. Slow speech no fever. Coughing fits. No acute events on 2.5 L O2    3/16/23 He had NSVT yesterday. We gave iv Mag and iv K. He is very somnolent this am no acute events overnight    3/17/23 on 2.5L O2 no fever no acute events.  Sleeping quietly no cp no sob  History:      EKG:  V Paced 82 w underlying SR  Past Medical History:   Diagnosis Date    Atrial fibrillation ([facility] [address]     Benign prostate hyperplasia     Hyperlipemia     Kidney disease     Prostate cancer ([facility] [address]     Sleep apnea     Stroke [facility])     Urinary retention      Past Surgical History:   Procedure Laterality Date    BRONCHOSCOPY N/A 3/10/2023    BRONCHOSCOPY AIRWAY CLEARANCE BRONCHOALVEOLAR performed by [facility] Ephraim Morrison MD at [facility] N/A        Family History  History reviewed. No pertinent family history. [] Unable to obtain due to ventilated and/ or neurologic status    Social History     Socioeconomic History    Marital status:      Spouse name: Not on file    Number of children: Not on file    Years of education: Not on file    Highest education level: Not on file   Occupational History    Not on file   Tobacco Use    Smoking status: Never    Smokeless tobacco: Never   Substance and Sexual Activity    Alcohol use: Never    Drug use: Never    Sexual activity: Not on file   Other Topics Concern    Not on file   Social History Narrative    Not on file     Social Determinants of Health     Financial Resource Strain: Not on file   Food Insecurity: Not on file   Transportation Needs: Not on file   Physical Activity: Not on file   Stress: Not on file   Social Connections: Not on file   Intimate Partner Violence: Not on file   Housing Stability: Not on file      [] Unable to obtain due to ventilated and/ or neurologic status      Home Medications:      Medications Prior to Admission: Aspirin 81 MG CAPS, Take 81 mg by mouth daily  fenofibrate (TRICOR) 145 MG tablet, Take 145 mg by mouth  ferrous sulfate 324 (65 Fe) MG EC tablet, Take 324 mg by mouth  gabapentin (NEURONTIN) 600 MG tablet, Take 600 mg by mouth in the morning, at noon, and at bedtime.   magnesium hydroxide (MILK OF MAGNESIA) 400 MG/5ML suspension, Take 30 mLs by mouth daily  Pantoprazole Sodium (PROTONIX PO), Take 40 mg by mouth daily  sertraline (ZOLOFT) 25 MG tablet, Take 75 mg by mouth daily  Vitamin D, Ergocalciferol, 18272 units CAPS, Take 1 tablet by mouth daily  acetaminophen (TYLENOL) 500 MG tablet, Take 2 tablets by mouth every 8 hours as needed  atorvastatin (LIPITOR) 40 MG tablet, Take 1 tablet by mouth daily  enoxaparin (LOVENOX) 40 MG/0.4ML, Inject 40 mg into the skin daily  ezetimibe (ZETIA) 10 MG tablet, Take 10 mg by mouth at bedtime  finasteride (PROSCAR) 5 MG tablet, Take 5 mg by mouth daily  guaiFENesin (MUCINEX) 600 MG extended release tablet, Take 1 tablet by mouth in the morning and at bedtime  HYDROcodone-acetaminophen (NORCO) 5-325 MG per tablet, Take 1 tablet by mouth every 6 hours as needed.   ipratropium-albuterol (DUONEB) 0.5-2.5 (3) MG/3ML SOLN nebulizer solution, Inhale 1 vial into the lungs 4 times daily  levoFLOXacin (LEVAQUIN) 750 MG tablet, Take 1 tablet by mouth daily  melatonin 3 MG TABS tablet, Take 1 tablet by mouth at bedtime  mirabegron (MYRBETRIQ) 50 MG TB24, Take 50 mg by mouth daily  predniSONE (DELTASONE) 20 MG tablet, Take 40 mg by mouth daily  tamsulosin (FLOMAX) 0.4 MG capsule, Take 0.4 mg by mouth at bedtime  methocarbamol (ROBAXIN) 750 MG tablet, Take 750 mg by mouth 3 times daily  furosemide (LASIX) 40 MG tablet, Take 40 mg by mouth daily  tadalafil (CIALIS) 5 MG tablet, Take 5 mg by mouth daily  [DISCONTINUED] Methocarbamol 1000 MG TABS, Take 750 mg by mouth 3 times daily    Current Hospital Medications:     Scheduled Meds:   metoprolol succinate  25 mg Oral Daily    magnesium oxide  400 mg Oral Daily    enoxaparin  40 mg SubCUTAneous BID    vancomycin (VANCOCIN) intermittent dosing (placeholder)   Other RX Placeholder    vancomycin  1,000 mg IntraVENous Q12H    furosemide  40 mg IntraVENous Once    aspirin  81 mg Oral Daily    atorvastatin  80 mg Oral Nightly    ezetimibe  10 mg Oral Nightly    finasteride  5 mg Oral Daily    melatonin  3 mg Oral Nightly    methocarbamol  750 mg Oral TID    pantoprazole  40 mg Oral Daily    sertraline  75 mg Oral Daily    tamsulosin  0.4 mg Oral Nightly    clonazePAM  0.25 mg Oral Q12H    sodium chloride  1,000 mL IntraVENous Once    sodium chloride flush  5-40 mL IntraVENous 2 times per day    guaiFENesin  600 mg Oral BID    albuterol sulfate HFA  2 puff Inhalation 4x daily     Continuous Infusions:   sodium chloride       PRN Meds:.hydrOXYzine HCl, albuterol sulfate HFA, trimethobenzamide, sodium chloride flush, sodium chloride, polyethylene glycol, acetaminophen **OR** acetaminophen, benzonatate, guaiFENesin-dextromethorphan  .   sodium chloride          Allergies: Allergies   Allergen Reactions    Penicillins     Sulfamethoxazole Hives    Tapentadol Hives    Trimethoprim Hives        Review of Systems:       Review of Systems   Constitutional:  Positive for fatigue. Negative for diaphoresis. HENT: Negative. Eyes: Negative. Respiratory:  Positive for cough and shortness of breath. Negative for chest tightness, wheezing and stridor. Cardiovascular: Negative. Negative for chest pain, palpitations and leg swelling. Gastrointestinal: Negative. Negative for blood in stool and nausea. Genitourinary: Negative. Musculoskeletal: Negative. Skin: Negative. Neurological:  Positive for weakness. Negative for dizziness, syncope and light-headedness. Hematological: Negative. Psychiatric/Behavioral: Negative. Objective Findings:     Vitals:/66   Pulse 79   Temp 97.7 °F (36.5 °C) (Oral)   Resp 16   Ht 5' 11\" (1.803 m)   Wt 178 lb (80.7 kg)   SpO2 97%   BMI 24.83 kg/m²      Physical Examination:    Physical Exam   Constitutional: No distress. He appears chronically ill and acutely ill. HENT:   Normal cephalic and Atraumatic   Eyes: Pupils are equal, round, and reactive to light. Neck: Thyroid normal. No JVD present. No neck adenopathy. No thyromegaly present. Cardiovascular: Regular rhythm, intact distal pulses and normal pulses. Murmur heard. Pulmonary/Chest: Effort normal. He has scattered wheezes. He exhibits no tenderness. Course BS   Abdominal: Soft. Bowel sounds are normal. There is no abdominal tenderness. Musculoskeletal:         General: Edema (trace) present. No tenderness. Normal range of motion. Cervical back: Normal range of motion and neck supple.    Neurological: He is alert and oriented to person, place, and time. Skin: Skin is warm. No cyanosis. Nails show no clubbing. Results/ Medications reviewed 3/17/2023, 10:50 AM     Laboratory, Microbiology, Pathology, Radiology, Cardiology, Medications and Transcriptions reviewed  Scheduled Meds:   metoprolol succinate  25 mg Oral Daily    magnesium oxide  400 mg Oral Daily    enoxaparin  40 mg SubCUTAneous BID    vancomycin (VANCOCIN) intermittent dosing (placeholder)   Other RX Placeholder    vancomycin  1,000 mg IntraVENous Q12H    furosemide  40 mg IntraVENous Once    aspirin  81 mg Oral Daily    atorvastatin  80 mg Oral Nightly    ezetimibe  10 mg Oral Nightly    finasteride  5 mg Oral Daily    melatonin  3 mg Oral Nightly    methocarbamol  750 mg Oral TID    pantoprazole  40 mg Oral Daily    sertraline  75 mg Oral Daily    tamsulosin  0.4 mg Oral Nightly    clonazePAM  0.25 mg Oral Q12H    sodium chloride  1,000 mL IntraVENous Once    sodium chloride flush  5-40 mL IntraVENous 2 times per day    guaiFENesin  600 mg Oral BID    albuterol sulfate HFA  2 puff Inhalation 4x daily     Continuous Infusions:   sodium chloride         Recent Labs     03/15/23  0503   WBC 8.0   HGB 8.7*   HCT 27.9*   MCV 90.4          Recent Labs     03/15/23  0503 03/16/23  0525    146*   K 2.9* 3.5   * 110*   CO2 30 29   BUN 19 16   CREATININE 0.61* 0.59*       No results for input(s): AST, ALT, ALB, BILIDIR, BILITOT, ALKPHOS in the last 72 hours. No results for input(s): LIPASE, AMYLASE in the last 72 hours. No results for input(s): PROT, INR in the last 72 hours. CTA CHEST W WO CONTRAST PE Eval    Result Date: 3/8/2023  EXAMINATION: CTA OF THE CHEST WITH AND WITHOUT CONTRAST 3/8/2023 5:33 pm TECHNIQUE: CTA of the chest was performed before and after the administration of intravenous contrast.  Multiplanar reformatted images are provided for review. MIP images are provided for review.  Automated exposure control, iterative reconstruction, and/or weight based adjustment of the mA/kV was utilized to reduce the radiation dose to as low as reasonably achievable. COMPARISON: None. HISTORY: ORDERING SYSTEM PROVIDED HISTORY: PE TECHNOLOGIST PROVIDED HISTORY: Reason for exam:->PE Decision Support Exception - unselect if not a suspected or confirmed emergency medical condition->Emergency Medical Condition (MA) What reading provider will be dictating this exam?->CRC FINDINGS: Pulmonary Arteries: Pulmonary arteries are adequately opacified for evaluation. No evidence of intraluminal filling defect to suggest pulmonary embolism. Main pulmonary artery is mildly prominent. Pulmonary arterial hypertension cannot be excluded. . Mediastinum: No evidence of mediastinal lymphadenopathy. The heart and pericardium demonstrate no acute abnormality. There is no acute abnormality of the thoracic aorta. There is mild aneurysmal dilatation of the ascending aorta which measures 4 cm. Lungs/pleura: 5 mm right upper lobe pulmonary nodule is noted (axial image 22). Atelectasis versus scarring is noted in the right lower lobe. There is right pleural calcification and pleural thickening which may be due to asbestos related pleural disease. There is volume loss in the left lung with mediastinal shift to the left. Consolidated left lung may reflect atelectasis or chronic scarring. There is pleural calcification and thickening in the left hemithorax which may rib reflect asbestos related pleural disease or chronic changes from remote hematoma or granulomatous disease such as tuberculosis. Upper Abdomen: Low-density lesions in the right kidney are likely cysts. 9 mm calcification in the left kidney and 1 cm calcification in the right kidney are consistent with renal calculi. Small calcified gallstones are noted. Soft Tissues/Bones: Post sternotomy changes are noted. Chronic T12 compression deformity is noted. There is fusion hardware in the visualized upper lumbar spine.      1. Negative study for pulmonary embolism. 2. Chronic scarring versus atelectasis in the left lung. 3. Bilateral pleural calcifications which may be due to asbestos related pleural disease, chronic hematomas or granulomatous disease. 4. Prominence of the main pulmonary arteries which may indicate pulmonary arterial hypertension. 5. Bilateral nephrolithiasis. 6. Cholelithiasis. 7. 5 mm right upper lobe pulmonary nodule. RECOMMENDATIONS: Fleischner Society guidelines for follow-up and management of incidentally detected pulmonary nodules: Single Solid Nodule: Nodule size less than 6 mm In a low-risk patient, no routine follow-up. In a high-risk patient, optional CT at 12 months. - Low risk patients include individuals with minimal or absent history of smoking and other known risk factors. - High risk patients include individuals with a history or smoking or known risk factors. Radiology 2017 http://pubs. rsna.org/doi/full/10.1148/radiol. 7163189253     XR CHEST PORTABLE    Result Date: 3/8/2023  EXAMINATION: ONE XRAY VIEW OF THE CHEST 3/8/2023 4:47 pm COMPARISON: None. HISTORY: ORDERING SYSTEM PROVIDED HISTORY: sob TECHNOLOGIST PROVIDED HISTORY: Reason for exam:->sob What reading provider will be dictating this exam?->CRC FINDINGS: The lungs are without acute focal process. Small right pleural effusion. Probable left pleural effusion. No pneumothorax. Cardiomegaly. Left-sided transvenous pacer device, prosthetic heart valve and sternotomy wires noted. The osseous structures are without acute process. Right pleural effusion. Probable left pleural effusion. Cardiomegaly.        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   PAF - in SR today. A/C on hold due to Hemoptysis. Appears stable.  Will need to resume at least Lower dose Lovenox for now. We will order.  He will need full dose DOAC when appropriate  BIO MVR and AVR post IE   Echo EF 10%  BioMVR w Moderate MS  BioAVR w severe AS  CABG- add ASA and statin.  BB when BP more stable.   HPL Statin low fat diet  HTN Stable   PPM CHB   C/o Panic Attack   Severe Hypokalemia- replaced. 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 currently can not adjust meds due to low BP.   added low dose BB given NSVT.  Stable no further arrhythmia overnight.   NSVT - additional K and Mag today, Daily Mag Oxide.  Keep on Telemetry  Tertiary care transfer in process.      Thank you for allowing us to participate in the care of this patient.     Will continue to follow.    Please call if questions or concerns arise.    Electronically signed by CORTNEY HEAD MD on 3/17/2023 at 10:50 AM

## 2023-03-17 NOTE — CARE COORDINATION
Pt is transfer to [facility]. Waiting for bed. No bed as of this AM.   Notify Karen from [facility] pt will be Tx to [facility]. LSW will follow.      Electronically signed by MANDEEP Núñez on 3/17/2023 at 8:26 AM

## 2023-03-17 NOTE — PROGRESS NOTES
INPATIENT PROGRESS NOTES    PATIENT NAME: Charlene Lainez  MRN: [id number]  SERVICE DATE:  March 17, 2023   SERVICE TIME:  1:36 PM      PRIMARY SERVICE: Pulmonary Disease    CHIEF COMPLAIN: Cardiac arrhythmia      INTERVAL HPI: Patient seen and examined at bedside, Interval Notes, orders reviewed. Nursing notes noted  Patient is transferred to [facility]. He has mild short of breath. He also has cough with thick mucus. No fever or chills. As per cardiology cardiac status is poor with both bioprosthetic valve deteriorated with the severe cardiomyopathy with EF of 10%    OBJECTIVE    Body mass index is 24.83 kg/m². PHYSICAL EXAM:  Vitals:  BP (!) 88/59   Pulse 62   Temp 98.1 °F (36.7 °C)   Resp 16   Ht 5' 11\" (1.803 m)   Wt 178 lb (80.7 kg)   SpO2 100%   BMI 24.83 kg/m²   General:Alert, awake . comfortable in bed, No distress. Head: Atraumatic , Normocephalic   Eyes: PERRL. No sclera icterus. No conjunctival injection. No discharge   ENT: No nasal  discharge. Pharynx clear. Neck:  Trachea midline. No thyromegaly, no JVD, No cervical adenopathy. Chest : Bilaterally symmetrical ,Normal effort,  No accessory muscle use  Lung : Diminished breath sound bilaterally. No Rales. No wheezing. No rhonchi. Heart[de-identified] Normal  rate. Regular rhythm. No mumur ,  Rub or gallop  ABD: Non-tender. Non-distended. No masses. No organmegaly. Normal bowel sounds. No hernia. Ext : Trace pitting both leg , No Cyanosis No clubbing  Neuro: no focal weakness          DATA:   Recent Labs     03/15/23  0503   WBC 8.0   HGB 8.7*   HCT 27.9*   MCV 90.4        Recent Labs     03/16/23  0525 03/17/23  1127   * 144   K 3.5 4.2   * 113*   CO2 29 26   BUN 16 16   CREATININE 0.59* 0.60*   GLUCOSE 97 114*   CALCIUM 8.3* 8.8   LABGLOM >60.0 >60.0       MV Settings:     FiO2 : 50 %    No results for input(s): PHART, LTW3TGO, PO2ART, VLZ4VCN, BEART, A4EAKTLK in the last 72 hours.     O2 Device: Nasal cannula  O2 Flow Rate (L/min): 2.5 L/min    ADULT DIET; Dysphagia - Soft and Bite Sized  ADULT ORAL NUTRITION SUPPLEMENT; Breakfast, Lunch, Dinner; Standard High Calorie/High Protein Oral Supplement     MEDICATIONS during current hospitalization:    Continuous Infusions:   sodium chloride         Scheduled Meds:   metoprolol succinate  25 mg Oral Daily    magnesium oxide  400 mg Oral Daily    enoxaparin  40 mg SubCUTAneous BID    vancomycin (VANCOCIN) intermittent dosing (placeholder)   Other RX Placeholder    vancomycin  1,000 mg IntraVENous Q12H    furosemide  40 mg IntraVENous Once    aspirin  81 mg Oral Daily    atorvastatin  80 mg Oral Nightly    ezetimibe  10 mg Oral Nightly    finasteride  5 mg Oral Daily    melatonin  3 mg Oral Nightly    methocarbamol  750 mg Oral TID    pantoprazole  40 mg Oral Daily    sertraline  75 mg Oral Daily    tamsulosin  0.4 mg Oral Nightly    clonazePAM  0.25 mg Oral Q12H    sodium chloride  1,000 mL IntraVENous Once    sodium chloride flush  5-40 mL IntraVENous 2 times per day    guaiFENesin  600 mg Oral BID    albuterol sulfate HFA  2 puff Inhalation 4x daily       PRN Meds:hydrOXYzine HCl, albuterol sulfate HFA, trimethobenzamide, sodium chloride flush, sodium chloride, polyethylene glycol, acetaminophen **OR** acetaminophen, benzonatate, guaiFENesin-dextromethorphan    Radiology  CTA CHEST W WO CONTRAST PE Eval    Result Date: 3/8/2023  EXAMINATION: CTA OF THE CHEST WITH AND WITHOUT CONTRAST 3/8/2023 5:33 pm TECHNIQUE: CTA of the chest was performed before and after the administration of intravenous contrast.  Multiplanar reformatted images are provided for review. MIP images are provided for review. Automated exposure control, iterative reconstruction, and/or weight based adjustment of the mA/kV was utilized to reduce the radiation dose to as low as reasonably achievable. COMPARISON: None.  HISTORY: ORDERING SYSTEM PROVIDED HISTORY: PE TECHNOLOGIST PROVIDED HISTORY: Reason for exam:->PE Decision Support Exception - unselect if not a suspected or confirmed emergency medical condition->Emergency Medical Condition (MA) What reading provider will be dictating this exam?->CRC FINDINGS: Pulmonary Arteries: Pulmonary arteries are adequately opacified for evaluation. No evidence of intraluminal filling defect to suggest pulmonary embolism. Main pulmonary artery is mildly prominent. Pulmonary arterial hypertension cannot be excluded. . Mediastinum: No evidence of mediastinal lymphadenopathy. The heart and pericardium demonstrate no acute abnormality. There is no acute abnormality of the thoracic aorta. There is mild aneurysmal dilatation of the ascending aorta which measures 4 cm. Lungs/pleura: 5 mm right upper lobe pulmonary nodule is noted (axial image 22). Atelectasis versus scarring is noted in the right lower lobe. There is right pleural calcification and pleural thickening which may be due to asbestos related pleural disease. There is volume loss in the left lung with mediastinal shift to the left. Consolidated left lung may reflect atelectasis or chronic scarring. There is pleural calcification and thickening in the left hemithorax which may rib reflect asbestos related pleural disease or chronic changes from remote hematoma or granulomatous disease such as tuberculosis. Upper Abdomen: Low-density lesions in the right kidney are likely cysts. 9 mm calcification in the left kidney and 1 cm calcification in the right kidney are consistent with renal calculi. Small calcified gallstones are noted. Soft Tissues/Bones: Post sternotomy changes are noted. Chronic T12 compression deformity is noted. There is fusion hardware in the visualized upper lumbar spine. 1. Negative study for pulmonary embolism. 2. Chronic scarring versus atelectasis in the left lung. 3. Bilateral pleural calcifications which may be due to asbestos related pleural disease, chronic hematomas or granulomatous disease.  4. Prominence of the main pulmonary arteries which may indicate pulmonary arterial hypertension. 5. Bilateral nephrolithiasis. 6. Cholelithiasis. 7. 5 mm right upper lobe pulmonary nodule. RECOMMENDATIONS: Fleischner Society guidelines for follow-up and management of incidentally detected pulmonary nodules: Single Solid Nodule: Nodule size less than 6 mm In a low-risk patient, no routine follow-up. In a high-risk patient, optional CT at 12 months. - Low risk patients include individuals with minimal or absent history of smoking and other known risk factors. - High risk patients include individuals with a history or smoking or known risk factors. Radiology 2017 http://pubs. rsna.org/doi/full/10.1148/radiol. 6187700412     XR CHEST PORTABLE    Result Date: 3/12/2023  EXAMINATION: ONE XRAY VIEW OF THE CHEST 3/12/2023 2:52 pm COMPARISON: Chest series from March 11, 2023 HISTORY: ORDERING SYSTEM PROVIDED HISTORY: pneumonia TECHNOLOGIST PROVIDED HISTORY: Reason for exam:->pneumonia What reading provider will be dictating this exam?->CRC FINDINGS: Left anterior chest wall cardiac support device with stable position of distal leads. Midline sternotomy hardware and postsurgical changes consistent with prior CABG. Valve prosthesis. There appears to be mild bilateral lung hyperinflation and background coarsened interstitial markings. Persistent dense retrocardiac and left basilar opacities. Faint bilateral interstitial opacities. Possible left pleural effusion. No obvious pneumothorax. Osseous and thoracic soft tissue structures demonstrate no acute findings. Osseous mineralization is decreased. 1.  Stable chest series. No change in dense retrocardiac and left basilar opacities. No change in possible left pleural effusion nor the faint interstitial opacities. 2.  Background coarsened interstitial markings. Atherosclerotic disease, cardiomegaly, and postsurgical changes.      XR CHEST PORTABLE    Result Date: 3/11/2023  EXAMINATION: ONE XRAY VIEW OF THE CHEST 3/11/2023 9:14 am COMPARISON: 03/10/2023, CT 03/08/2023 HISTORY: ORDERING SYSTEM PROVIDED HISTORY: hypoxia TECHNOLOGIST PROVIDED HISTORY: Reason for exam:->hypoxia What reading provider will be dictating this exam?->CRC FINDINGS: Left-sided cardiac pacemaker. Median sternotomy and cardiac valve. Previous spine surgery. Left greater than right pleural effusions. Diffuse interstitial prominence suggesting component of pulmonary edema or atypical infection in the acute setting. Hazy right lung base opacity likely atelectasis or scar. Dense opacity in the left lung base. Improved aeration of the left upper and mid lung zone with some residual strandy opacity. Cardiomegaly. Atherosclerotic vascular calcifications. Dense opacity in the left lung base likely atelectasis or pneumonia. Diffuse interstitial prominence suggestive of atypical infection or edema possibly superimposed on chronic lung changes. Improved aeration of the left mid and upper lung zone with residual strandy opacity in this region likely representing atelectasis/scar or inflammatory disease. Left greater than right pleural effusions. Cardiomegaly. XR CHEST PORTABLE    Result Date: 3/10/2023  EXAMINATION: ONE XRAY VIEW OF THE CHEST 3/10/2023 5:39 am COMPARISON: March 8, 2023 HISTORY: ORDERING SYSTEM PROVIDED HISTORY: hypoxia, sob TECHNOLOGIST PROVIDED HISTORY: Reason for exam:->hypoxia, sob What reading provider will be dictating this exam?->CRC FINDINGS: Unchanged complete white out left hemithorax with leftward shift of the cardio mediastinum. Pacemaker, prosthetic valve and sternal wires noted. Right lung remains clear. No pneumothorax or effusion. 1. No acute disease. No change from March 8, 2023. RECOMMENDATION: Careful clinical correlation and follow up recommended. XR CHEST PORTABLE    Result Date: 3/8/2023  EXAMINATION: ONE XRAY VIEW OF THE CHEST 3/8/2023 4:47 pm COMPARISON: None.  HISTORY: ORDERING SYSTEM PROVIDED HISTORY: sob TECHNOLOGIST PROVIDED HISTORY: Reason for exam:->sob What reading provider will be dictating this exam?->CRC FINDINGS: The lungs are without acute focal process. Small right pleural effusion. Probable left pleural effusion. No pneumothorax. Cardiomegaly. Left-sided transvenous pacer device, prosthetic heart valve and sternotomy wires noted. The osseous structures are without acute process. Right pleural effusion. Probable left pleural effusion. Cardiomegaly. Bronchoscopy    Result Date: 3/10/2023  [facility] Patient: Axel Castillo MRN: [id number] : 1945 Account: [de-identified] Gender: Male Age: 76 Years Procedure: Bronchoscopy Date: 3/10/2023 Attending Physician: Korina Alcantar Indications:        -  Atelectasis of the left lower lobe Complications:        -  No immediate complications Estimated Blood Loss:        -  Estimated blood loss was minimal. Procedure:        - The scope was introduced through the mouth, via the endotracheal tube (the           patient was intubated for the procedure) and advanced to tracheobronchial tree. -  The patient tolerated the procedure well. -  The procedure was accomplished without difficulty. Findings:        - see note in epic please Impression:        -  Atelectasis of the left lower lobe        - see note in epic please        -  No specimens collected. Procedure Code(s):        - N258520, Bronchoscopy, rigid or flexible, including fluoroscopic guidance,           when performed; diagnostic, with cell washing, when performed (separate           procedure) Diagnosis Code(s):        - J98.11, Atelectasis       CPT(R) - 202 copyright American Medical Association. All Rights Reserved. The CPT codes, CCI edits and ICD codes generated are intended as suggestions       and were generated based on input data.   These codes are preliminary and upon        review may be revised to meet current compliance and payer requirements. The provider is responsible for the final determination of appropriate codes,       and modifiers. Scope Withdrawal Time:       00:00:02 SANTOS LEW This document has been electronically signed. Note Initiated:3/10/2023 Note Completed:3/10/2023 9:29 AM        IMPRESSION AND SUGGESTION:  Acute on chronic respiratory failure with hypoxia  Left lung collapse status post bronchoscopy with aspiration of tracheobronchial secretion. Staph aureus pneumonia  COVID-19 infection  Rule out like likely due to asbestos exposure  Bilateral pleural effusion  5 mm lung nodule    Continue O2 to keep saturation 90% or above. Patient is transferring 1 W. since cardiovascular status is poor with due to radiation and bioprosthetic valve. With severe cardiomyopathy with EF 10% and waiting to be transferred to tertiary care center at [facility]. NOTE: This report was transcribed using voice recognition software. Every effort was made to ensure accuracy; however, inadvertent computerized transcription errors may be present.       Electronically signed by Denzel Cardona MD, FCCP on 3/17/2023 at 1:36 PM

## 2023-03-17 NOTE — PROGRESS NOTES
Physician Progress Note    3/17/2023   5:55 PM    Name:  Erica Henry  MRN:    [id number]     IP Day: 9     Admit Date: 3/8/2023  3:27 PM  PCP: Jeferson Grover MD    Code Status:  Full Code    Subjective:     He still has shortness of breath     Current Facility-Administered Medications   Medication Dose Route Frequency Provider Last Rate Last Admin    metoprolol succinate (TOPROL XL) extended release tablet 25 mg  25 mg Oral Daily Christina Su MD   25 mg at 03/17/23 1026    magnesium oxide (MAG-OX) tablet 400 mg  400 mg Oral Daily Christina Su MD   400 mg at 03/17/23 1026    enoxaparin (LOVENOX) injection 40 mg  40 mg SubCUTAneous BID Christina Su MD   40 mg at 03/17/23 1026    vancomycin (VANCOCIN) intermittent dosing (placeholder)   Other RX Placeholder Maryjane Schultz MD        vancomycin 1000 mg IVPB in 250 mL NS addavial  1,000 mg IntraVENous Q12H Maryjane Schultz MD   Stopped at 03/17/23 0750    hydrOXYzine HCl (ATARAX) tablet 25 mg  25 mg Oral Q6H PRN Terrilee Rumps, DO   25 mg at 03/15/23 2044    albuterol sulfate HFA (PROVENTIL;VENTOLIN;PROAIR) 108 (90 Base) MCG/ACT inhaler 2 puff  2 puff Inhalation Q2H PRN Terrilee Rumps, DO        furosemide (LASIX) injection 40 mg  40 mg IntraVENous Once Christina Su MD        aspirin EC tablet 81 mg  81 mg Oral Daily Christina Su MD   81 mg at 03/17/23 1026    atorvastatin (LIPITOR) tablet 80 mg  80 mg Oral Nightly Christina Su MD   80 mg at 03/16/23 2055    ezetimibe (ZETIA) tablet 10 mg  10 mg Oral Nightly Terrilee Rumps, DO   10 mg at 03/16/23 2055    finasteride (PROSCAR) tablet 5 mg  5 mg Oral Daily Terrilee Rumps, DO   5 mg at 03/17/23 1026    melatonin tablet 3 mg  3 mg Oral Nightly Terrilee Rumps, DO   3 mg at 03/16/23 2055    methocarbamol (ROBAXIN) tablet 750 mg  750 mg Oral TID Terrilee Rumps, DO   750 mg at 03/17/23 1026    pantoprazole (PROTONIX) tablet 40 mg  40 mg Oral Daily Sánchez James DO   40 mg at 03/17/23 1026    sertraline (ZOLOFT) tablet 75 mg  75 mg Oral Daily Harriet Bars, DO   75 mg at 23 1028    tamsulosin (FLOMAX) capsule 0.4 mg  0.4 mg Oral Nightly Harriet Bars, DO   0.4 mg at 23    trimethobenzamide (TIGAN) injection 200 mg  200 mg IntraMUSCular Q6H PRN Harriet Bars, DO        clonazePAM (KLONOPIN) tablet 0.25 mg  0.25 mg Oral Q12H Manaf Ayden Graves MD   0.25 mg at 23 1832    0.9 % sodium chloride bolus  1,000 mL IntraVENous Once Harriet Bars, DO   Held at 23 1628    sodium chloride flush 0.9 % injection 5-40 mL  5-40 mL IntraVENous 2 times per day Harriet Bars, DO   10 mL at 23 1027    sodium chloride flush 0.9 % injection 5-40 mL  5-40 mL IntraVENous PRN Harriet Bars, DO   10 mL at 23 0620    0.9 % sodium chloride infusion   IntraVENous PRN Harriet Bars, DO        polyethylene glycol (GLYCOLAX) packet 17 g  17 g Oral Daily PRN Harriet Bars, DO        acetaminophen (TYLENOL) tablet 650 mg  650 mg Oral Q6H PRN Harriet Bars, DO   650 mg at 03/15/23 1200    Or    acetaminophen (TYLENOL) suppository 650 mg  650 mg Rectal Q6H PRN Harriet Bars, DO        guaiFENesin [facility]) extended release tablet 600 mg  600 mg Oral BID Harriet Bars, DO   600 mg at 23 102    benzonatate (TESSALON) capsule 100 mg  100 mg Oral TID PRN Harriet Bars, DO   100 mg at 23    guaiFENesin-dextromethorphan (ROBITUSSIN DM) 100-10 MG/5ML syrup 5 mL  5 mL Oral Q4H PRN Harriet Bars, DO   5 mL at 23    albuterol sulfate HFA (PROVENTIL;VENTOLIN;PROAIR) 108 (90 Base) MCG/ACT inhaler 2 puff  2 puff Inhalation 4x daily Harriet Bars, DO   2 puff at 23 1431       Physical Examination:      Vitals:  BP (!) 88/59   Pulse 62   Temp 98.1 °F (36.7 °C)   Resp 16   Ht 5' 11\" (1.803 m)   Wt 178 lb (80.7 kg)   SpO2 100%   BMI 24.83 kg/m²   Temp (24hrs), Av.9 °F (36.6 °C), Min:97.7 °F (36.5 °C), Max:98.1 °F (36.7 °C)    General appearance: alert, cooperative and no distress. Elderly and chronically ill-appearing but able to have normal conversation. Oriented to person, hospital.  He thought month was February  Lungs: Rhonchi appreciated  Heart: regular rate and rhythm, no murmur  Abdomen: soft, nontender, nondistended, bowel sounds present, no masses  Extremities: No pitting edema. No redness, tenderness in the calves. Cap refill <2s  Skin: no gross lesions, rashes    Data:     Labs:  Recent Labs     03/15/23  0503   WBC 8.0   HGB 8.7*          Recent Labs     03/16/23  0525 03/17/23  1127   * 144   K 3.5 4.2   * 113*   CO2 29 26   BUN 16 16   CREATININE 0.59* 0.60*   GLUCOSE 97 114*       No results for input(s): AST, ALT, ALB, BILITOT, ALKPHOS in the last 72 hours. Assessment and Plan:        1. Acute respiratory failure with hypoxia suspect primarily due to atelectasis related to mucous plugging, with superimposed Staph aureus pneumonia. S/p bronchoscopy on 3/10 with thick white secretions noted throughout tracheobronchial tree worse on the left with complete obstruction of left mainstem bronchus. Mucosa easily bleeds on suction trauma. Secretions were washed and cleaned  -Completed course of steroids  -Antibiotic per pulmonology. 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      37 minutes in total care time    Electronically signed by Mason Reyes MD on 3/17/2023 at 5:55 PM

## 2023-03-17 NOTE — FLOWSHEET NOTE
Pt transferred down to [facility] because of worsening cardiac status. Pt is awaiting a bed at [facility]. Pt is on tele-v pacing. 1400  Pt resting quietly. Pt repositioned to his side because he was complaining of his back and bottom was hurting him. Nurse applied a mepelex to his sacrum to the excoriated areas. Pt does have slight edema to his ble. Mepelex applied to his heels bilat. Head of bed up. Call bell in reach. 65  Pts wife called and talked to this nurse. Wife voiced several concerns about various issues. Reassurance offered. Pt did refuse his dinner. 1830  Pt remains v paced on tele.   No arrythmias noted

## 2023-03-17 NOTE — PROGRESS NOTES
Reached out to Dr. Keven Selby regarding transfer to [address] [place] [address] after report given. Patient had multiple cardiac episodes. Recent EF shown at 10%.   Reviewed

## 2023-03-18 VITALS
HEART RATE: 91 BPM | BODY MASS INDEX: 24.6 KG/M2 | SYSTOLIC BLOOD PRESSURE: 95 MMHG | DIASTOLIC BLOOD PRESSURE: 69 MMHG | WEIGHT: 175.7 LBS | TEMPERATURE: 97.3 F | RESPIRATION RATE: 18 BRPM | HEIGHT: 71 IN | OXYGEN SATURATION: 96 %

## 2023-03-18 LAB
ANION GAP SERPL CALCULATED.3IONS-SCNC: 6 MEQ/L (ref 9–15)
BUN SERPL-MCNC: 16 MG/DL (ref 8–23)
CALCIUM SERPL-MCNC: 8.6 MG/DL (ref 8.5–9.9)
CHLORIDE SERPL-SCNC: 113 MEQ/L (ref 95–107)
CO2 SERPL-SCNC: 29 MEQ/L (ref 20–31)
CREAT SERPL-MCNC: 0.66 MG/DL (ref 0.7–1.2)
GLUCOSE SERPL-MCNC: 88 MG/DL (ref 70–99)
POTASSIUM SERPL-SCNC: 3.7 MEQ/L (ref 3.4–4.9)
SODIUM SERPL-SCNC: 148 MEQ/L (ref 135–144)

## 2023-03-18 PROCEDURE — 99232 SBSQ HOSP IP/OBS MODERATE 35: CPT | Performed by: INTERNAL MEDICINE

## 2023-03-18 PROCEDURE — 80048 BASIC METABOLIC PNL TOTAL CA: CPT

## 2023-03-18 PROCEDURE — 94640 AIRWAY INHALATION TREATMENT: CPT

## 2023-03-18 PROCEDURE — 6370000000 HC RX 637 (ALT 250 FOR IP): Performed by: INTERNAL MEDICINE

## 2023-03-18 PROCEDURE — 36415 COLL VENOUS BLD VENIPUNCTURE: CPT

## 2023-03-18 PROCEDURE — 2580000003 HC RX 258: Performed by: INTERNAL MEDICINE

## 2023-03-18 PROCEDURE — 6360000002 HC RX W HCPCS: Performed by: INTERNAL MEDICINE

## 2023-03-18 PROCEDURE — 94761 N-INVAS EAR/PLS OXIMETRY MLT: CPT

## 2023-03-18 PROCEDURE — 94664 DEMO&/EVAL PT USE INHALER: CPT

## 2023-03-18 PROCEDURE — 2700000000 HC OXYGEN THERAPY PER DAY

## 2023-03-18 RX ADMIN — ASPIRIN 81 MG: 81 TABLET, COATED ORAL at 09:16

## 2023-03-18 RX ADMIN — ALBUTEROL SULFATE 2 PUFF: 90 AEROSOL, METERED RESPIRATORY (INHALATION) at 07:34

## 2023-03-18 RX ADMIN — VANCOMYCIN HYDROCHLORIDE 1000 MG: 1 INJECTION, POWDER, LYOPHILIZED, FOR SOLUTION INTRAVENOUS at 06:06

## 2023-03-18 RX ADMIN — GUAIFENESIN 600 MG: 600 TABLET, EXTENDED RELEASE ORAL at 09:16

## 2023-03-18 RX ADMIN — FINASTERIDE 5 MG: 5 TABLET, FILM COATED ORAL at 09:16

## 2023-03-18 RX ADMIN — Medication 400 MG: at 09:16

## 2023-03-18 RX ADMIN — SERTRALINE HYDROCHLORIDE 75 MG: 50 TABLET ORAL at 09:16

## 2023-03-18 RX ADMIN — PANTOPRAZOLE SODIUM 40 MG: 40 TABLET, DELAYED RELEASE ORAL at 09:16

## 2023-03-18 RX ADMIN — ENOXAPARIN SODIUM 40 MG: 100 INJECTION SUBCUTANEOUS at 09:16

## 2023-03-18 RX ADMIN — Medication 10 ML: at 09:24

## 2023-03-18 ASSESSMENT — ENCOUNTER SYMPTOMS
STRIDOR: 0
SHORTNESS OF BREATH: 1
CHEST TIGHTNESS: 0
WHEEZING: 0
EYES NEGATIVE: 1
NAUSEA: 0
GASTROINTESTINAL NEGATIVE: 1
COUGH: 1
BLOOD IN STOOL: 0

## 2023-03-18 NOTE — PROGRESS NOTES
Progress Note      Patient Name: Tania Leaver Date: 3/8/2023  3:27 PM  MR #: [id number]  : 1945    Attending Physician: Fidelia Quan MD  Reason for consult: Elevated BNP and Troponin    History of Presenting Illness:      Erica Henry is a 76 y.o. male on hospital day 10 with a history of . History Obtained From:  patient, electronic medical record    Admitted w respiratory failure. Dx w COVID 6 days ago. Pt is coughing up very thick yellow sputum. He has significant cough. He is SOB. He has been at [facility] and transferred for worse respiratory distress. 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    He had CABG. 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 Was on Warfarin but due to inability to maintain therapeutic INR was change to Eliquis. He sees [facility]. He has a PPM for CHB. He has Mild LE Edema and Lung sounds are course and diminished. 3/10/23  no fever. Continues to cough up thick yellow sputum. Having coughing fits. On 4L O2 sats 92-99%   Hypotensive early in am.     3/13/23 Had Hemoptysis. Hb 9.1 and stable No Fever on 3L O2. Still wet course cough. C/o Panic attack but appears comfortable to me.     3/14/23 no fever on 3L O2. Somnolent. Coughing denies pain. 3/15/23 Somnolent. Slow speech no fever. Coughing fits. No acute events on 2.5 L O2    3/16/23 He had NSVT yesterday. We gave iv Mag and iv K. He is very somnolent this am no acute events overnight    3/17/23 on 2.5L O2 no fever no acute events. Sleeping quietly no cp no sob    3/18/23 no acute events overnight. No arrhythmia on Telemetry. Denies CP. Pt is somnolent but arouse able.    History:      EKG:  V Paced 66 w underlying SR  Past Medical History:   Diagnosis Date    Atrial fibrillation ([facility] [address]     Benign prostate hyperplasia     Hyperlipemia     Kidney disease     Prostate cancer ([facility] [address]     Sleep apnea     Stroke [facility])     Urinary retention      Past Surgical History:   Procedure Laterality Date    BRONCHOSCOPY N/A 3/10/2023    BRONCHOSCOPY AIRWAY CLEARANCE BRONCHOALVEOLAR performed by Deanne Alvarez MD at [place] [id number] N/A        Family History  History reviewed. No pertinent family history. [] Unable to obtain due to ventilated and/ or neurologic status    Social History     Socioeconomic History    Marital status:      Spouse name: Not on file    Number of children: Not on file    Years of education: Not on file    Highest education level: Not on file   Occupational History    Not on file   Tobacco Use    Smoking status: Never    Smokeless tobacco: Never   Substance and Sexual Activity    Alcohol use: Never    Drug use: Never    Sexual activity: Not on file   Other Topics Concern    Not on file   Social History Narrative    Not on file     Social Determinants of Health     Financial Resource Strain: Not on file   Food Insecurity: Not on file   Transportation Needs: Not on file   Physical Activity: Not on file   Stress: Not on file   Social Connections: Not on file   Intimate Partner Violence: Not on file   Housing Stability: Not on file      [] Unable to obtain due to ventilated and/ or neurologic status      Home Medications:      Medications Prior to Admission: Aspirin 81 MG CAPS, Take 81 mg by mouth daily  fenofibrate (TRICOR) 145 MG tablet, Take 145 mg by mouth  ferrous sulfate 324 (65 Fe) MG EC tablet, Take 324 mg by mouth  gabapentin (NEURONTIN) 600 MG tablet, Take 600 mg by mouth in the morning, at noon, and at bedtime.   magnesium hydroxide (MILK OF MAGNESIA) 400 MG/5ML suspension, Take 30 mLs by mouth daily  Pantoprazole Sodium (PROTONIX PO), Take 40 mg by mouth daily  sertraline (ZOLOFT) 25 MG tablet, Take 75 mg by mouth daily  Vitamin D, Ergocalciferol, 65080 units CAPS, Take 1 tablet by mouth daily  acetaminophen (TYLENOL) 500 MG tablet, Take 2 tablets by mouth every 8 hours as needed  atorvastatin (LIPITOR) 40 MG tablet, Take 1 tablet by mouth daily  enoxaparin (LOVENOX) 40 MG/0.4ML, Inject 40 mg into the skin daily  ezetimibe (ZETIA) 10 MG tablet, Take 10 mg by mouth at bedtime  finasteride (PROSCAR) 5 MG tablet, Take 5 mg by mouth daily  guaiFENesin (MUCINEX) 600 MG extended release tablet, Take 1 tablet by mouth in the morning and at bedtime  HYDROcodone-acetaminophen (NORCO) 5-325 MG per tablet, Take 1 tablet by mouth every 6 hours as needed.   ipratropium-albuterol (DUONEB) 0.5-2.5 (3) MG/3ML SOLN nebulizer solution, Inhale 1 vial into the lungs 4 times daily  levoFLOXacin (LEVAQUIN) 750 MG tablet, Take 1 tablet by mouth daily  melatonin 3 MG TABS tablet, Take 1 tablet by mouth at bedtime  mirabegron (MYRBETRIQ) 50 MG TB24, Take 50 mg by mouth daily  predniSONE (DELTASONE) 20 MG tablet, Take 40 mg by mouth daily  tamsulosin (FLOMAX) 0.4 MG capsule, Take 0.4 mg by mouth at bedtime  methocarbamol (ROBAXIN) 750 MG tablet, Take 750 mg by mouth 3 times daily  furosemide (LASIX) 40 MG tablet, Take 40 mg by mouth daily  tadalafil (CIALIS) 5 MG tablet, Take 5 mg by mouth daily  [DISCONTINUED] Methocarbamol 1000 MG TABS, Take 750 mg by mouth 3 times daily    Current Hospital Medications:     Scheduled Meds:   metoprolol succinate  25 mg Oral Daily    magnesium oxide  400 mg Oral Daily    enoxaparin  40 mg SubCUTAneous BID    vancomycin (VANCOCIN) intermittent dosing (placeholder)   Other RX Placeholder    vancomycin  1,000 mg IntraVENous Q12H    furosemide  40 mg IntraVENous Once    aspirin  81 mg Oral Daily    atorvastatin  80 mg Oral Nightly    ezetimibe  10 mg Oral Nightly    finasteride  5 mg Oral Daily    melatonin  3 mg Oral Nightly    methocarbamol  750 mg Oral TID    pantoprazole  40 mg Oral Daily    sertraline  75 mg Oral Daily    tamsulosin  0.4 mg Oral Nightly    clonazePAM  0.25 mg Oral Q12H    sodium chloride  1,000 mL IntraVENous Once    sodium chloride flush  5-40 mL IntraVENous 2 times per day    guaiFENesin  600 mg Oral BID    albuterol sulfate HFA  2 puff Inhalation 4x daily     Continuous Infusions:   sodium chloride       PRN Meds:.hydrOXYzine HCl, albuterol sulfate HFA, trimethobenzamide, sodium chloride flush, sodium chloride, polyethylene glycol, acetaminophen **OR** acetaminophen, benzonatate, guaiFENesin-dextromethorphan  .   sodium chloride          Allergies: Allergies   Allergen Reactions    Penicillins     Sulfamethoxazole Hives    Tapentadol Hives    Trimethoprim Hives        Review of Systems:       Review of Systems   Constitutional:  Positive for fatigue. Negative for diaphoresis. HENT: Negative. Eyes: Negative. Respiratory:  Positive for cough and shortness of breath. Negative for chest tightness, wheezing and stridor. Cardiovascular: Negative. Negative for chest pain, palpitations and leg swelling. Gastrointestinal: Negative. Negative for blood in stool and nausea. Genitourinary: Negative. Musculoskeletal: Negative. Skin: Negative. Neurological:  Positive for weakness. Negative for dizziness, syncope and light-headedness. Hematological: Negative. Psychiatric/Behavioral: Negative. Objective Findings:     Vitals:BP 94/61   Pulse 80   Temp 98.1 °F (36.7 °C) (Oral)   Resp 18   Ht 5' 11\" (1.803 m)   Wt 175 lb 11.2 oz (79.7 kg)   SpO2 98%   BMI 24.51 kg/m²      Physical Examination:    Physical Exam   Constitutional: No distress. He appears chronically ill and acutely ill. HENT:   Normal cephalic and Atraumatic   Eyes: Pupils are equal, round, and reactive to light. Neck: Thyroid normal. No JVD present. No neck adenopathy. No thyromegaly present. Cardiovascular: Regular rhythm, intact distal pulses and normal pulses. Murmur heard. Pulmonary/Chest: Effort normal. He has scattered wheezes. He exhibits no tenderness. Course BS   Abdominal: Soft. Bowel sounds are normal. There is no abdominal tenderness. Musculoskeletal:         General: Edema (trace) present. No tenderness. Normal range of motion. Cervical back: Normal range of motion and neck supple. Neurological: He is alert and oriented to person, place, and time. Skin: Skin is warm. No cyanosis. Nails show no clubbing. Results/ Medications reviewed 3/18/2023, 6:24 AM     Laboratory, Microbiology, Pathology, Radiology, Cardiology, Medications and Transcriptions reviewed  Scheduled Meds:   metoprolol succinate  25 mg Oral Daily    magnesium oxide  400 mg Oral Daily    enoxaparin  40 mg SubCUTAneous BID    vancomycin (VANCOCIN) intermittent dosing (placeholder)   Other RX Placeholder    vancomycin  1,000 mg IntraVENous Q12H    furosemide  40 mg IntraVENous Once    aspirin  81 mg Oral Daily    atorvastatin  80 mg Oral Nightly    ezetimibe  10 mg Oral Nightly    finasteride  5 mg Oral Daily    melatonin  3 mg Oral Nightly    methocarbamol  750 mg Oral TID    pantoprazole  40 mg Oral Daily    sertraline  75 mg Oral Daily    tamsulosin  0.4 mg Oral Nightly    clonazePAM  0.25 mg Oral Q12H    sodium chloride  1,000 mL IntraVENous Once    sodium chloride flush  5-40 mL IntraVENous 2 times per day    guaiFENesin  600 mg Oral BID    albuterol sulfate HFA  2 puff Inhalation 4x daily     Continuous Infusions:   sodium chloride         No results for input(s): WBC, HGB, HCT, MCV, PLT in the last 72 hours. Recent Labs     03/16/23  0525 03/17/23  1127 03/18/23  0447   * 144 148*   K 3.5 4.2 3.7   * 113* 113*   CO2 29 26 29   BUN 16 16 16   CREATININE 0.59* 0.60* 0.66*       No results for input(s): AST, ALT, ALB, BILIDIR, BILITOT, ALKPHOS in the last 72 hours. No results for input(s): LIPASE, AMYLASE in the last 72 hours. No results for input(s): PROT, INR in the last 72 hours.     CTA CHEST W WO CONTRAST PE Eval    Result Date: 3/8/2023  EXAMINATION: CTA OF THE CHEST WITH AND WITHOUT CONTRAST 3/8/2023 5:33 pm TECHNIQUE: CTA of the chest was performed before and after the administration of intravenous contrast. Multiplanar reformatted images are provided for review. MIP images are provided for review. Automated exposure control, iterative reconstruction, and/or weight based adjustment of the mA/kV was utilized to reduce the radiation dose to as low as reasonably achievable. COMPARISON: None. HISTORY: ORDERING SYSTEM PROVIDED HISTORY: PE TECHNOLOGIST PROVIDED HISTORY: Reason for exam:->PE Decision Support Exception - unselect if not a suspected or confirmed emergency medical condition->Emergency Medical Condition (MA) What reading provider will be dictating this exam?->CRC FINDINGS: Pulmonary Arteries: Pulmonary arteries are adequately opacified for evaluation. No evidence of intraluminal filling defect to suggest pulmonary embolism. Main pulmonary artery is mildly prominent. Pulmonary arterial hypertension cannot be excluded. . Mediastinum: No evidence of mediastinal lymphadenopathy. The heart and pericardium demonstrate no acute abnormality. There is no acute abnormality of the thoracic aorta. There is mild aneurysmal dilatation of the ascending aorta which measures 4 cm. Lungs/pleura: 5 mm right upper lobe pulmonary nodule is noted (axial image 22). Atelectasis versus scarring is noted in the right lower lobe. There is right pleural calcification and pleural thickening which may be due to asbestos related pleural disease. There is volume loss in the left lung with mediastinal shift to the left. Consolidated left lung may reflect atelectasis or chronic scarring. There is pleural calcification and thickening in the left hemithorax which may rib reflect asbestos related pleural disease or chronic changes from remote hematoma or granulomatous disease such as tuberculosis. Upper Abdomen: Low-density lesions in the right kidney are likely cysts. 9 mm calcification in the left kidney and 1 cm calcification in the right kidney are consistent with renal calculi. Small calcified gallstones are noted.  Soft Tissues/Bones: Post sternotomy changes are noted. Chronic T12 compression deformity is noted. There is fusion hardware in the visualized upper lumbar spine. 1. Negative study for pulmonary embolism. 2. Chronic scarring versus atelectasis in the left lung. 3. Bilateral pleural calcifications which may be due to asbestos related pleural disease, chronic hematomas or granulomatous disease. 4. Prominence of the main pulmonary arteries which may indicate pulmonary arterial hypertension. 5. Bilateral nephrolithiasis. 6. Cholelithiasis. 7. 5 mm right upper lobe pulmonary nodule. RECOMMENDATIONS: Fleischner Society guidelines for follow-up and management of incidentally detected pulmonary nodules: Single Solid Nodule: Nodule size less than 6 mm In a low-risk patient, no routine follow-up. In a high-risk patient, optional CT at 12 months. - Low risk patients include individuals with minimal or absent history of smoking and other known risk factors. - High risk patients include individuals with a history or smoking or known risk factors. Radiology 2017 http://pubs. rsna.org/doi/full/10.1148/radiol. 7328473589     XR CHEST PORTABLE    Result Date: 3/8/2023  EXAMINATION: ONE XRAY VIEW OF THE CHEST 3/8/2023 4:47 pm COMPARISON: None. HISTORY: ORDERING SYSTEM PROVIDED HISTORY: sob TECHNOLOGIST PROVIDED HISTORY: Reason for exam:->sob What reading provider will be dictating this exam?->CRC FINDINGS: The lungs are without acute focal process. Small right pleural effusion. Probable left pleural effusion. No pneumothorax. Cardiomegaly. Left-sided transvenous pacer device, prosthetic heart valve and sternotomy wires noted. The osseous structures are without acute process. Right pleural effusion. Probable left pleural effusion. Cardiomegaly.        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 PAF - in SR today. A/C on hold due to Hemoptysis. Appears stable. Will need to resume at least Lower dose Lovenox for now. We will order. He will need full dose DOAC when appropriate  BIO MVR and AVR post IE   Echo EF 10%  BioMVR w Moderate MS  BioAVR w severe AS  CABG- add ASA and statin. BB when BP more stable. HPL Statin low fat diet  HTN Stable   PPM CHB   C/o Panic Attack   Severe Hypokalemia- replaced. 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 currently can not adjust meds due to low BP.   added low dose BB given NSVT. Stable no further arrhythmia overnight. Volume status appears balanced  NSVT - additional K and Mag today, Daily Mag Oxide. Keep on Telemetry  Tertiary care transfer in process. Thank you for allowing us to participate in the care of this patient. Will continue to follow. Please call if questions or concerns arise.     Electronically signed by Calista Marie MD on 3/18/2023 at 6:24 AM

## 2023-03-18 NOTE — PROGRESS NOTES
INPATIENT PROGRESS NOTES    PATIENT NAME: Sergio Hurley  MRN: [id number]  SERVICE DATE:  March 18, 2023   SERVICE TIME:  9:24 AM      PRIMARY SERVICE: Pulmonary Disease    CHIEF COMPLAIN: Cardiac arrhythmia      INTERVAL HPI: Patient seen and examined at bedside, Interval Notes, orders reviewed. Nursing notes noted  Patient is transferred to [facility]. He has mild short of breath. He also has cough with thick mucus. No fever or chills. As per cardiology cardiac status is poor with both bioprosthetic valve deteriorated with the severe cardiomyopathy with EF of 10%    OBJECTIVE    Body mass index is 24.51 kg/m². PHYSICAL EXAM:  Vitals:  BP 95/69   Pulse 91   Temp 97.3 °F (36.3 °C) (Oral)   Resp 18   Ht 5' 11\" (1.803 m)   Wt 175 lb 11.2 oz (79.7 kg)   SpO2 96%   BMI 24.51 kg/m²   General:Alert, awake . comfortable in bed, No distress. Head: Atraumatic , Normocephalic   Eyes: PERRL. No sclera icterus. No conjunctival injection. No discharge   ENT: No nasal  discharge. Pharynx clear. Neck:  Trachea midline. No thyromegaly, no JVD, No cervical adenopathy. Chest : Bilaterally symmetrical ,Normal effort,  No accessory muscle use  Lung : Diminished breath sound bilaterally. No Rales. No wheezing. No rhonchi. Heart[de-identified] Normal  rate. Regular rhythm. No mumur ,  Rub or gallop  ABD: Non-tender. Non-distended. No masses. No organmegaly. Normal bowel sounds. No hernia. Ext : Trace pitting both leg , No Cyanosis No clubbing  Neuro: no focal weakness          DATA:   No results for input(s): WBC, HGB, HCT, MCV, PLT in the last 72 hours. Recent Labs     03/17/23  1127 03/18/23  0447    148*   K 4.2 3.7   * 113*   CO2 26 29   BUN 16 16   CREATININE 0.60* 0.66*   GLUCOSE 114* 88   CALCIUM 8.8 8.6   LABGLOM >60.0 >60.0       MV Settings:     FiO2 : 50 %    No results for input(s): PHART, AUL0NMQ, PO2ART, TFL0UUJ, BEART, U1NAWHRR in the last 72 hours.     O2 Device: Nasal cannula  O2 Flow Rate (L/min): 2 L/min    ADULT DIET; Dysphagia - Soft and Bite Sized  ADULT ORAL NUTRITION SUPPLEMENT; Breakfast, Lunch, Dinner; Standard High Calorie/High Protein Oral Supplement     MEDICATIONS during current hospitalization:    Continuous Infusions:   sodium chloride         Scheduled Meds:   metoprolol succinate  25 mg Oral Daily    magnesium oxide  400 mg Oral Daily    enoxaparin  40 mg SubCUTAneous BID    vancomycin (VANCOCIN) intermittent dosing (placeholder)   Other RX Placeholder    vancomycin  1,000 mg IntraVENous Q12H    furosemide  40 mg IntraVENous Once    aspirin  81 mg Oral Daily    atorvastatin  80 mg Oral Nightly    ezetimibe  10 mg Oral Nightly    finasteride  5 mg Oral Daily    melatonin  3 mg Oral Nightly    methocarbamol  750 mg Oral TID    pantoprazole  40 mg Oral Daily    sertraline  75 mg Oral Daily    tamsulosin  0.4 mg Oral Nightly    clonazePAM  0.25 mg Oral Q12H    sodium chloride  1,000 mL IntraVENous Once    sodium chloride flush  5-40 mL IntraVENous 2 times per day    guaiFENesin  600 mg Oral BID    albuterol sulfate HFA  2 puff Inhalation 4x daily       PRN Meds:hydrOXYzine HCl, albuterol sulfate HFA, trimethobenzamide, sodium chloride flush, sodium chloride, polyethylene glycol, acetaminophen **OR** acetaminophen, benzonatate, guaiFENesin-dextromethorphan    Radiology  CTA CHEST W WO CONTRAST PE Eval    Result Date: 3/8/2023  EXAMINATION: CTA OF THE CHEST WITH AND WITHOUT CONTRAST 3/8/2023 5:33 pm TECHNIQUE: CTA of the chest was performed before and after the administration of intravenous contrast.  Multiplanar reformatted images are provided for review. MIP images are provided for review. Automated exposure control, iterative reconstruction, and/or weight based adjustment of the mA/kV was utilized to reduce the radiation dose to as low as reasonably achievable. COMPARISON: None.  HISTORY: ORDERING SYSTEM PROVIDED HISTORY: PE TECHNOLOGIST PROVIDED HISTORY: Reason for exam:->PE Decision Support Exception - unselect if not a suspected or confirmed emergency medical condition->Emergency Medical Condition (MA) What reading provider will be dictating this exam?->CRC FINDINGS: Pulmonary Arteries: Pulmonary arteries are adequately opacified for evaluation. No evidence of intraluminal filling defect to suggest pulmonary embolism. Main pulmonary artery is mildly prominent. Pulmonary arterial hypertension cannot be excluded. . Mediastinum: No evidence of mediastinal lymphadenopathy. The heart and pericardium demonstrate no acute abnormality. There is no acute abnormality of the thoracic aorta. There is mild aneurysmal dilatation of the ascending aorta which measures 4 cm. Lungs/pleura: 5 mm right upper lobe pulmonary nodule is noted (axial image 22). Atelectasis versus scarring is noted in the right lower lobe. There is right pleural calcification and pleural thickening which may be due to asbestos related pleural disease. There is volume loss in the left lung with mediastinal shift to the left. Consolidated left lung may reflect atelectasis or chronic scarring. There is pleural calcification and thickening in the left hemithorax which may rib reflect asbestos related pleural disease or chronic changes from remote hematoma or granulomatous disease such as tuberculosis. Upper Abdomen: Low-density lesions in the right kidney are likely cysts. 9 mm calcification in the left kidney and 1 cm calcification in the right kidney are consistent with renal calculi. Small calcified gallstones are noted. Soft Tissues/Bones: Post sternotomy changes are noted. Chronic T12 compression deformity is noted. There is fusion hardware in the visualized upper lumbar spine. 1. Negative study for pulmonary embolism. 2. Chronic scarring versus atelectasis in the left lung. 3. Bilateral pleural calcifications which may be due to asbestos related pleural disease, chronic hematomas or granulomatous disease.  4. Prominence of the main pulmonary arteries which may indicate pulmonary arterial hypertension. 5. Bilateral nephrolithiasis. 6. Cholelithiasis. 7. 5 mm right upper lobe pulmonary nodule. RECOMMENDATIONS: Fleischner Society guidelines for follow-up and management of incidentally detected pulmonary nodules: Single Solid Nodule: Nodule size less than 6 mm In a low-risk patient, no routine follow-up. In a high-risk patient, optional CT at 12 months. - Low risk patients include individuals with minimal or absent history of smoking and other known risk factors. - High risk patients include individuals with a history or smoking or known risk factors. Radiology 2017 http://pubs. rsna.org/doi/full/10.1148/radiol. 7257534786     XR CHEST PORTABLE    Result Date: 3/12/2023  EXAMINATION: ONE XRAY VIEW OF THE CHEST 3/12/2023 2:52 pm COMPARISON: Chest series from March 11, 2023 HISTORY: ORDERING SYSTEM PROVIDED HISTORY: pneumonia TECHNOLOGIST PROVIDED HISTORY: Reason for exam:->pneumonia What reading provider will be dictating this exam?->CRC FINDINGS: Left anterior chest wall cardiac support device with stable position of distal leads. Midline sternotomy hardware and postsurgical changes consistent with prior CABG. Valve prosthesis. There appears to be mild bilateral lung hyperinflation and background coarsened interstitial markings. Persistent dense retrocardiac and left basilar opacities. Faint bilateral interstitial opacities. Possible left pleural effusion. No obvious pneumothorax. Osseous and thoracic soft tissue structures demonstrate no acute findings. Osseous mineralization is decreased. 1.  Stable chest series. No change in dense retrocardiac and left basilar opacities. No change in possible left pleural effusion nor the faint interstitial opacities. 2.  Background coarsened interstitial markings. Atherosclerotic disease, cardiomegaly, and postsurgical changes.      XR CHEST PORTABLE    Result Date: 3/11/2023  EXAMINATION: ONE XRAY VIEW OF THE CHEST 3/11/2023 9:14 am COMPARISON: 03/10/2023, CT 03/08/2023 HISTORY: ORDERING SYSTEM PROVIDED HISTORY: hypoxia TECHNOLOGIST PROVIDED HISTORY: Reason for exam:->hypoxia What reading provider will be dictating this exam?->CRC FINDINGS: Left-sided cardiac pacemaker. Median sternotomy and cardiac valve. Previous spine surgery. Left greater than right pleural effusions. Diffuse interstitial prominence suggesting component of pulmonary edema or atypical infection in the acute setting. Hazy right lung base opacity likely atelectasis or scar. Dense opacity in the left lung base. Improved aeration of the left upper and mid lung zone with some residual strandy opacity. Cardiomegaly. Atherosclerotic vascular calcifications. Dense opacity in the left lung base likely atelectasis or pneumonia. Diffuse interstitial prominence suggestive of atypical infection or edema possibly superimposed on chronic lung changes. Improved aeration of the left mid and upper lung zone with residual strandy opacity in this region likely representing atelectasis/scar or inflammatory disease. Left greater than right pleural effusions. Cardiomegaly. XR CHEST PORTABLE    Result Date: 3/10/2023  EXAMINATION: ONE XRAY VIEW OF THE CHEST 3/10/2023 5:39 am COMPARISON: March 8, 2023 HISTORY: ORDERING SYSTEM PROVIDED HISTORY: hypoxia, sob TECHNOLOGIST PROVIDED HISTORY: Reason for exam:->hypoxia, sob What reading provider will be dictating this exam?->CRC FINDINGS: Unchanged complete white out left hemithorax with leftward shift of the cardio mediastinum. Pacemaker, prosthetic valve and sternal wires noted. Right lung remains clear. No pneumothorax or effusion. 1. No acute disease. No change from March 8, 2023. RECOMMENDATION: Careful clinical correlation and follow up recommended. XR CHEST PORTABLE    Result Date: 3/8/2023  EXAMINATION: ONE XRAY VIEW OF THE CHEST 3/8/2023 4:47 pm COMPARISON: None.  HISTORY: ORDERING SYSTEM PROVIDED HISTORY: sob TECHNOLOGIST PROVIDED HISTORY: Reason for exam:->sob What reading provider will be dictating this exam?->CRC FINDINGS: The lungs are without acute focal process. Small right pleural effusion. Probable left pleural effusion. No pneumothorax. Cardiomegaly. Left-sided transvenous pacer device, prosthetic heart valve and sternotomy wires noted. The osseous structures are without acute process. Right pleural effusion. Probable left pleural effusion. Cardiomegaly. Bronchoscopy    Result Date: 3/10/2023  [facility] Patient: Axel Castillo MRN: [id number] : 1945 Account: [de-identified] Gender: Male Age: 76 Years Procedure: Bronchoscopy Date: 3/10/2023 Attending Physician: Korina Alcantar Indications:        -  Atelectasis of the left lower lobe Complications:        -  No immediate complications Estimated Blood Loss:        -  Estimated blood loss was minimal. Procedure:        - The scope was introduced through the mouth, via the endotracheal tube (the           patient was intubated for the procedure) and advanced to tracheobronchial tree. -  The patient tolerated the procedure well. -  The procedure was accomplished without difficulty. Findings:        - see note in epic please Impression:        -  Atelectasis of the left lower lobe        - see note in epic please        -  No specimens collected. Procedure Code(s):        - [id number], Bronchoscopy, rigid or flexible, including fluoroscopic guidance,           when performed; diagnostic, with cell washing, when performed (separate           procedure) Diagnosis Code(s):        - J98.11, Atelectasis       CPT(R) -  copyright American Medical Association. All Rights Reserved. The CPT codes, CCI edits and ICD codes generated are intended as suggestions       and were generated based on input data.   These codes are preliminary and upon        review may be revised to meet current compliance and payer requirements. The provider is responsible for the final determination of appropriate codes,       and modifiers. Scope Withdrawal Time:       00:00:02 SANTOS LEW This document has been electronically signed. Note Initiated:3/10/2023 Note Completed:3/10/2023 9:29 AM        IMPRESSION AND SUGGESTION:  Acute on chronic respiratory failure with hypoxia  Left lung collapse status post bronchoscopy with aspiration of tracheobronchial secretion. Staph aureus pneumonia  COVID-19 infection  Rule out like likely due to asbestos exposure  Bilateral pleural effusion  5 mm lung nodule    Patient is going to [facility] this morning. since cardiovascular status is poor with due to radiation and bioprosthetic valve. With severe cardiomyopathy with EF 10% Continue O2 to keep saturation 90% or above. NOTE: This report was transcribed using voice recognition software. Every effort was made to ensure accuracy; however, inadvertent computerized transcription errors may be present.       Electronically signed by Niko Butler MD, FCCP on 3/18/2023 at 9:24 AM

## 2023-03-18 NOTE — FLOWSHEET NOTE
Patient a&ox2 reoriented as needed noted with fatigue and lethargy delayed responses vs 97.3-91-18-95/69 96% o2 @ 2l nc denies pain transferred to [facility] main at this time patient in agreement with transfer transported via stretcher by [facility] verbal report given to [address] [place] [address] at receiving facility no s/s distress no complications noted at time of transfer attempted to notify wife Asha Kilpatrick of transfer no answer at this time

## 2023-03-18 NOTE — PLAN OF CARE
Problem: Discharge Planning  Goal: Discharge to home or other facility with appropriate resources  Outcome: Progressing     Problem: Safety - Adult  Goal: Free from fall injury  3/18/2023 1022 by Iline Seip, RN  Outcome: Progressing  3/17/2023 2340 by Darren Cox. Abelino Long RN  Outcome: Progressing     Problem: Skin/Tissue Integrity  Goal: Absence of new skin breakdown  Description: 1. Monitor for areas of redness and/or skin breakdown  2. Assess vascular access sites hourly  3. Every 4-6 hours minimum:  Change oxygen saturation probe site  4. Every 4-6 hours:  If on nasal continuous positive airway pressure, respiratory therapy assess nares and determine need for appliance change or resting period. 3/18/2023 1022 by Iline Seip, RN  Outcome: Progressing  3/17/2023 2340 by Darren Cox. Abelino Long RN  Outcome: Progressing     Problem: Chronic Conditions and Co-morbidities  Goal: Patient's chronic conditions and co-morbidity symptoms are monitored and maintained or improved  3/18/2023 1022 by Iline Seip, RN  Outcome: Progressing  3/17/2023 2340 by Darren Cox.  Abelino Long RN  Outcome: Progressing     Problem: Nutrition Deficit:  Goal: Optimize nutritional status  Outcome: Progressing

## 2023-03-18 NOTE — PROGRESS NOTES
[facility] Respiratory Therapy Evaluation   Current Order:  Albuterol MDI 2 puffs QID & Q2 PRN      Home Regimen: QID per patient      Ordering Physician: Pavel Wakefield  Re-evaluation Date:  3/21     Diagnosis: Resp failure w/ hypoxia      Patient Status: Stable / Unstable + Physician notified    The following MDI Criteria must be met in order to convert aerosol to MDI with spacer.  If unable to meet, MDI will be converted to aerosol:  []  Patient able to demonstrate the ability to use MDI effectively  []  Patient alert and cooperative  []  Patient able to take deep breath with 5-10 second hold  []  Medication(s) available in this delivery method   []  Peak flow greater than or equal to 200 ml/min            Current Order Substituted To  (same drug, same frequency)   Aerosol to MDI [] Albuterol Sulfate 0.083% unit dose by aerosol Albuterol Sulfate MDI 2 puffs by inhalation with spacer    [] Levalbuterol 1.25 mg unit dose by aerosol Levalbuterol MDI 2 puffs by inhalation with spacer    [] Levalbuterol 0.63 mg unit dose by aerosol Levalbuterol MDI 2 puffs by inhalation with spacer    [] Ipratropium Bromide 0.02% unit dose by aerosol Ipratropium Bromide MDI 2 puffs by inhalation with spacer    [] Duoneb (Ipratropium + Albuterol) unit dose by aerosol Ipratropium MDI + Albuterol MDI 2 puffs by inhalation w/spacer   MDI to Aerosol [] Albuterol Sulfate MDI Albuterol Sulfate 0.083% unit dose by aerosol    [] Levalbuterol MDI 2 puffs by inhalation Levalbuterol 1.25 mg unit dose by aerosol    [] Ipratropium Bromide MDI by inhalation Ipratropium Bromide 0.02% unit dose by aerosol    [] Combivent (Ipratropium + Albuterol) MDI by inhalation Duoneb (Ipratropium + Albuterol) unit dose by aerosol       Treatment Assessment [Frequency/Schedule]:  Change frequency to: _________No Changes Home Freq_________________________________________per Protocol, P&T, MEC      Points 0 1 2 3 4   Pulmonary Status  Non-Smoker  []   Smoking history   < 20 pack years  []   Smoking history  ?  20 pack years  []   Pulmonary Disorder  (acute or chronic)  [x]   Severe or Chronic w/ Exacerbation  []     Surgical Status No [x]   Surgeries     General []   Surgery Lower []   Abdominal Thoracic or []   Upper Abdominal Thoracic with  PulmonaryDisorder  []     Chest X-ray Clear/Not  Ordered     [x]  Chronic Changes  Results Pending  []  Infiltrates, atelectasis, pleural effusion, or edema  []  Infiltrates in more than one lobe []  Infiltrate + Atelectasis, &/or pleural effusion  []    Respiratory Pattern Regular,  RR = 12-20 [x]  Increased,  RR = 21-25 []  HUERTA, irregular,  or RR = 26-30 []  Decreased FEV1  or RR = 31-35 []  Severe SOB, use  of accessory muscles, or RR ? 35  []    Mental Status Alert, oriented,  Cooperative [x]  Confused but Follows commands []  Lethargic or unable to follow commands []  Obtunded  []  Comatose  []    Breath Sounds Clear to  auscultation  []  Decreased unilaterally or  in bases only []  Decreased  bilaterally  [x]  Crackles or intermittent wheezes []  Wheezes []    Cough Strong, Spontan., & nonproductive []  Strong,  spontaneous, &  productive [x]  Weak,  Nonproductive []  Weak, productive or  with wheezes []  No spontaneous  cough or may require suctioning []    Level of Activity Ambulatory []  Ambulatory w/ Assist  [x]  Non-ambulatory []  Paraplegic []  Quadriplegic []    Total    Score:___7____     Triage Score:___4_____      Tri       Triage:     1. (>20) Freq: Q3    2. (16-20) Freq: Q4   3. (11-15) Freq: QID & Albuterol Q2 PRN    4. (6-10) Freq: TID & Albuterol Q2 PRN    5. (0-5) Freq Q4prn

## 2023-03-18 NOTE — PROGRESS NOTES
Patient assessment completed. He is A&Ox4. 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 However, his responses are very delayed and at times has trouble finding his words - per assessment flowsheet. Weakness in all four extremities, but even. He is lethargic and returns to sleep when not talking. He does awaken easily to voice. Paced on tele. SOB with exertion, even talking.

## 2023-04-01 LAB — PRELIMINARY: NORMAL

## 2023-04-08 LAB
FINAL REPORT: NORMAL
PRELIMINARY: NORMAL

## 2023-05-06 LAB
AFB STAIN: NORMAL
FINAL REPORT: NORMAL
PRELIMINARY: NORMAL

## 2023-05-07 LAB
AFB STAIN: NORMAL
FINAL REPORT: NORMAL
PRELIMINARY: NORMAL

## 2025-04-22 NOTE — PROGRESS NOTES
"  Problem: Fall Risk - Rehab  Goal: Patient will remain free from falls  Outcome: Progressing  Note: Greer Chino Fall risk Assessment Score: 15    High fall risk Interventions   - Alarming seatbelt  - Bed and strip alarm   - Yellow sign by the door   - Yellow wrist band \"Fall risk\"  - Room near to the nurse station  - Do not leave patient unattended in the bathroom  - Fall risk education provided      Problem: Knowledge Deficit - Standard  Goal: Patient and family/care givers will demonstrate understanding of plan of care, disease process/condition, diagnostic tests and medications  Outcome: Progressing  Pt encouraged to take pain med to relieve discomfort on the sacral area and to position either right or left side , pt refused , stated I 'm used to sleep this way. Will continue to reinforce teachings. .     The patient is Stable - Low risk of patient condition declining or worsening    Shift Goals  Clinical Goals: SAFETY  Patient Goals: Rest  Family Goals: NILSON    Progress made toward(s) clinical / shift goals:  Pt free from fall and injury.         " Per [facility], no available bed at [facility] as of 2027.     Electronically signed by Manisha Tejada RN on 3/16/23 at 4:37 PM EDT

## (undated) DEVICE — CANNULA NSL SM AD L7FT 6LPM CLR 3 CHN CRV TAPR LTWT OVR THE

## (undated) DEVICE — AIRLIFE™ OXYGEN TUBING 7 FEET (2.1 M) CRUSH RESISTANT OXYGEN TUBING, VINYL TIPPED: Brand: AIRLIFE™

## (undated) DEVICE — PAD,NON-ADHERENT,3X8,STERILE,LF,1/PK: Brand: MEDLINE

## (undated) DEVICE — ENDOSCOPIC TRAY TRNSPRT 20.5X16.5X4.1 IN RECYCL SUGAR PULP

## (undated) DEVICE — APPLICATOR  COTTON-TIPPED 6 IN WOOD STRL

## (undated) DEVICE — BRUSH ENDO CLN L90.5IN SHTH DIA1.7MM BRIST DIA5-7MM 2-6MM

## (undated) DEVICE — 4-PORT MANIFOLD: Brand: NEPTUNE 2

## (undated) DEVICE — HYPODERMIC SAFETY NEEDLE: Brand: MAGELLAN

## (undated) DEVICE — SINGLE USE SUCTION VALVE MAJ-209: Brand: SINGLE USE SUCTION VALVE (STERILE)

## (undated) DEVICE — TUBING, SUCTION, 9/32" X 12', STRAIGHT: Brand: MEDLINE INDUSTRIES, INC.

## (undated) DEVICE — SYRINGE MED 5ML STD CLR PLAS N CTRL SLIP TIP DISP

## (undated) DEVICE — SYRINGE MED 30ML STD CLR PLAS LUERSLIP TIP N CTRL DISP

## (undated) DEVICE — CONTAINER,SPECIMEN,OR STERILE,4OZ: Brand: MEDLINE

## (undated) DEVICE — SYRINGE MED 10ML SLIP TIP BLNT FILL AND LUERLOCK DISP